# Patient Record
Sex: FEMALE | Race: WHITE | Employment: FULL TIME | URBAN - METROPOLITAN AREA
[De-identification: names, ages, dates, MRNs, and addresses within clinical notes are randomized per-mention and may not be internally consistent; named-entity substitution may affect disease eponyms.]

---

## 2018-01-09 ENCOUNTER — RADIANT APPOINTMENT (OUTPATIENT)
Dept: GENERAL RADIOLOGY | Facility: CLINIC | Age: 58
End: 2018-01-09
Attending: INTERNAL MEDICINE
Payer: COMMERCIAL

## 2018-01-09 ENCOUNTER — OFFICE VISIT (OUTPATIENT)
Dept: FAMILY MEDICINE | Facility: CLINIC | Age: 58
End: 2018-01-09
Payer: COMMERCIAL

## 2018-01-09 VITALS
SYSTOLIC BLOOD PRESSURE: 145 MMHG | HEART RATE: 64 BPM | WEIGHT: 143 LBS | DIASTOLIC BLOOD PRESSURE: 80 MMHG | OXYGEN SATURATION: 99 % | BODY MASS INDEX: 24.19 KG/M2 | RESPIRATION RATE: 15 BRPM | TEMPERATURE: 97.1 F

## 2018-01-09 DIAGNOSIS — R03.0 ELEVATED BLOOD PRESSURE READING WITHOUT DIAGNOSIS OF HYPERTENSION: ICD-10-CM

## 2018-01-09 DIAGNOSIS — M25.522 LEFT ELBOW PAIN: Primary | ICD-10-CM

## 2018-01-09 DIAGNOSIS — M25.522 LEFT ELBOW PAIN: ICD-10-CM

## 2018-01-09 PROCEDURE — 99214 OFFICE O/P EST MOD 30 MIN: CPT | Performed by: INTERNAL MEDICINE

## 2018-01-09 PROCEDURE — 73070 X-RAY EXAM OF ELBOW: CPT | Mod: LT

## 2018-01-09 NOTE — NURSING NOTE
"Chief Complaint   Patient presents with     Elbow Pain     c/o left elbow pain after a fall 2 weeks ago       Initial /84  Pulse 64  Temp 97.1  F (36.2  C) (Oral)  Resp 15  Wt 143 lb (64.9 kg)  SpO2 99%  Breastfeeding? No  BMI 24.19 kg/m2 Estimated body mass index is 24.19 kg/(m^2) as calculated from the following:    Height as of 8/2/16: 5' 4.47\" (1.638 m).    Weight as of this encounter: 143 lb (64.9 kg).  Medication Reconciliation: complete   Marie Buchanan MA      "

## 2018-01-09 NOTE — MR AVS SNAPSHOT
After Visit Summary   1/9/2018    Kaleigh Tyson    MRN: [id number]           Patient Information     Date Of Birth          1960        Visit Information        Provider Department      1/9/2018 1:40 PM Adleita Bianchi MD [facility]        Today's Diagnoses     Left elbow pain    -  1    Elevated blood pressure reading without diagnosis of hypertension           Follow-ups after your visit        Additional Services     [facility] PT, HAND, AND CHIROPRACTIC REFERRAL       **This order will print in the [facility] Scheduling Office**    Physical Therapy, Hand Therapy and Chiropractic Care are available through:    *[facility]  *[facility]  *[facility]    Call one number to schedule at any of the above locations: [phone].    Your provider has referred you to: Physical Therapy at [facility] or [facility]    Indication/Reason for Referral: Elbow Pain  Onset of Illness: weeks  Therapy Orders: Evaluate and Treat  Special Programs: None  Special Request: None    Jolanta Stover      Additional Comments for the Therapist or Chiropractor:     Please be aware that coverage of these services is subject to the terms and limitations of your health insurance plan.  Call member services at your health plan with any benefit or coverage questions.      Please bring the following to your appointment:    *Your personal calendar for scheduling future appointments  *Comfortable clothing                  Follow-up notes from your care team     Return in about 2 weeks (around 1/23/2018) for with physical therapy or primary care doctor.      Who to contact     If you have questions or need follow up information about today's clinic visit or your schedule please contact [facility] directly at [phone].  Normal or non-critical lab and imaging results will be communicated to you by MyChart, letter or phone within 4 business days after the clinic has  "received the results. If you do not hear from us within 7 days, please contact the clinic through [employer] or phone. If you have a critical or abnormal lab result, we will notify you by phone as soon as possible.  Submit refill requests through [employer] or call your pharmacy and they will forward the refill request to us. Please allow 3 business days for your refill to be completed.          Additional Information About Your Visit        [email or web address] Information     [employer] lets you send messages to your doctor, view your test results, renew your prescriptions, schedule appointments and more. To sign up, go to [email or web address] . Click on \"Log in\" on the left side of the screen, which will take you to the Welcome page. Then click on \"Sign up Now\" on the right side of the page.     You will be asked to enter the access code listed below, as well as some personal information. Please follow the directions to create your username and password.     Your access code is: [email or web address]  Expires: 2018  2:14 PM     Your access code will  in 90 days. If you need help or a new code, please call your [facility] clinic or [phone].        Care EveryWhere ID     This is your Care EveryWhere ID. This could be used by other organizations to access your [facility] medical records  [id number]        Your Vitals Were     Pulse Temperature Respirations Pulse Oximetry Breastfeeding? BMI (Body Mass Index)    64 97.1  F (36.2  C) (Oral) 15 99% No 24.19 kg/m2       Blood Pressure from Last 3 Encounters:   18 145/80   16 139/69   09 138/88    Weight from Last 3 Encounters:   18 143 lb (64.9 kg)   16 143 lb (64.9 kg)   09 169 lb (76.7 kg)              We Performed the Following     [facility]TY PT, HAND, AND CHIROPRACTIC REFERRAL        Primary Care Provider Office Phone # Fax #    [facility] [phone] [address] [facility] [address]        Equal Access to Services "     KARRIE BRADLEY : Hadii aad ku ruba Kaplan, wataylorda luqadaha, qaybta kaalmada josyhannahherman, waxlisbeth willie astudilloyoselinjose tomlinson . So [facility] [place] [phone].    ATENCIÓN: Si habla español, tiene a pelaez disposición servicios gratuitos de asistencia lingüística. Llame al [phone].    We comply with applicable federal civil rights laws and [place] laws. We do not discriminate on the basis of race, color, national origin, age, disability, sex, sexual orientation, or gender identity.            Thank you!     [facility] you for [facility]  [facility]. Our goal is always to provide you with excellent care. Hearing back from our patients is one way we can continue to improve our services. Please take a few minutes to complete the written survey that you may receive in the mail after your visit with us. Thank you!             Your Updated Medication List - Protect others around you: Learn how to safely use, store and throw away your medicines at www.disposemymeds.org.          This list is accurate as of: 1/9/18  2:38 PM.  Always use your most recent med list.                   Brand Name Dispense Instructions for use Diagnosis    EPINEPHrine 0.3 MG/0.3ML injection 2-pack    EPIPEN/ADRENACLICK/or ANY BX GENERIC EQUIV     Inject 0.3 mg into the muscle

## 2018-01-09 NOTE — PROGRESS NOTES
"SUBJECTIVE:  Kaleigh Tyson is an 57 year old female who presents for elbow pain.  A couple weeks ago tripped and fell in the garage.  When she fell her left elbow hit a fireplace that was there waiting to be installed.  Had some pain at the time.    No bruising or swelling.  When moves elbow feels \"something\" near elbow.  No fever, chills, sweats.  No meds taken for sxs.  Didn't ice it.  A few months ago had a glass door that was being installed fall and hit her elbow, which seemed to get better and be okay.  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  No other injuries from fall and did not hit head.  No dizziness or weakness or cp or shortness of breath before or after the fall.       has a past medical history of Elevated cholesterol.  Social History     Social History     Marital status:      Spouse name: N/A     Number of children: N/A     Years of education: N/A     Social History Main Topics     Smoking status: Former Smoker     Types: Cigarettes     Quit date: 1/1/2008     Smokeless tobacco: None     Alcohol use Yes      Comment: rare     Drug use: No     Sexual activity: Yes     Partners: Male     Other Topics Concern     None     Social History Narrative     Family History   Problem Relation Age of Onset     HEART DISEASE Father      Dementia Father      Neurologic Disorder Brother      Lipids Sister      Lipids Sister      Myocardial Infarction Mother      Hypertension Mother        ALLERGIES:  Bees    Current Outpatient Prescriptions   Medication     EPINEPHrine (EPIPEN) 0.3 MG/0.3ML injection     No current facility-administered medications for this visit.          ROS:  ROS is done and is negative for general, constitutional, eye, ENT, Respiratory, cardiovascular, GI, , Skin, musculoskeletal except as noted elsewhere.      OBJECTIVE:  /80  Pulse 64  Temp 97.1  F (36.2  C) (Oral)  Resp 15  Wt 143 lb (64.9 kg)  SpO2 99%  Breastfeeding? " No  BMI 24.19 kg/m2  GENERAL APPEARANCE: Alert, in no acute distress  EYES: normal  NECK:No adenopathy,masses or thyromegaly  RESP: normal and clear to auscultation  CV:regular rate and rhythm and no murmurs, clicks, or gallops  ABDOMEN: Abdomen soft, non-tender. BS normal. No masses, organomegaly  SKIN: no ulcers, lesions or rash  MUSCULOSKELETAL:left elbow - no erythema, edema or bruising.  Normal rom in all directions without pain.  Very mild tenderness just superior to medial epicondyle.  NEURO:  DTRs 2+,  sensation to touch grossly intact and strength 5/5 and symmetric in bilateral UEs.      RESULTS  Xray left elbow: no fractures or dislocation noted.  Await radiology reading  .      ASSESSMENT/PLAN:    ASSESSMENT / PLAN:  (M25.522) Left elbow pain  (primary encounter diagnosis)  Comment: pain seems to be fairly mild at this point and does not keep her from doing things.  C/w soft tissue injury from fall and impact.  Plan: XR Elbow Left 2 Views, BETTY PT, HAND, AND         CHIROPRACTIC REFERRAL        I reviewed supportive care including ice, ibuprofen and rest; expected course, and signs of concern.  Follow up as needed or if she does not improve within 2 week(s) or if worsens in any way. If not improved in 2 weeks, she is to go to PT and if still not improve with PT, f/u with pcp or ortho. Reviewed red flag symptoms and is to go to the ER if experiences any of these.      (R03.0) Elevated blood pressure reading without diagnosis of hypertension  Comment: second bp improved from first reading, but still a bit elevated  Plan: advised to recheck BP at pharmacy in 1-2 weeks and if remains elevated, f/u with pcp.      See [facility] for orders, medications, letters, patient instructions    Adelita Bianchi M.D.

## 2018-03-05 NOTE — PROGRESS NOTES
SUBJECTIVE:   CC: Kaleigh Tyson is an 57 year old woman who presents for preventive health visit.     Answers for HPI/ROS submitted by the patient on 3/19/2018   Annual Exam:  Getting at least 3 servings of Calcium per day:: Yes  Bi-annual eye exam:: NO  Dental care twice a year:: Yes  Sleep apnea or symptoms of sleep apnea:: None  Diet:: Breakfast skipped, Other  Frequency of exercise:: 2-3 days/week  Taking medications regularly:: Yes  Medication side effects:: None  Additional concerns today:: No  PHQ-2 Score: 0  Duration of exercise:: 15-30 minutes            Today's PHQ-2 Score:   PHQ-2 ( 1999 [employer]) 3/19/2018 1/9/2018   Q1: Little interest or pleasure in doing things 0 0   Q2: Feeling down, depressed or hopeless 0 0   PHQ-2 Score 0 0   Q1: Little interest or pleasure in doing things Not at all -   Q2: Feeling down, depressed or hopeless Not at all -   PHQ-2 Score 0 -       Abuse: Current or Past(Physical, Sexual or Emotional)- No  Do you feel safe in your environment - Yes    Social History   Substance Use Topics     Smoking status: Former Smoker     Types: Cigarettes     Quit date: 1/1/2008     Smokeless tobacco: Current User     Alcohol use Yes      Comment: rare     If you drink alcohol do you typically have >3 drinks per day or >7 drinks per week? No                     Reviewed orders with patient.  Reviewed health maintenance and updated orders accordingly - Yes    G 1 P 1   No LMP recorded. Patient is postmenopausal.     Fasting: Yes    Td: tdap 2016       Last 3 Pap and HPV Results:   PAP / HPV 8/2/2016   PAP NIL                  Cholesterol:   Lab Results   Component Value Date    CHOL 239 08/02/2016     Lab Results   Component Value Date    HDL 53 08/02/2016     Lab Results   Component Value Date     08/02/2016     Lab Results   Component Value Date    TRIG 176 08/02/2016     Lab Results   Component Value Date    CHOLHDLRATIO 6.1 09/14/2009     The 10-year ASCVD risk score (Flor WALTER Jr,  et al., 2013) is: 2.9%    Values used to calculate the score:      Age: 57 years      Sex: Female      Is Non- : No      Diabetic: No      Tobacco smoker: No      Systolic Blood Pressure: 138 mmHg      Is BP treated: No      HDL Cholesterol: 53 mg/dL      Total Cholesterol: 239 mg/dL     MM/16  Dexa:  NA     Flex/colo: due      Seat Belt: Yes    Sunscreen use: Yes   Calcium Intake: adeq  Health Care Directive: No  Sexually Active: Yes     Current contraception: none  History of abnormal Pap smear: No  Family history of colon/breast/ovarian cancer: No  Regular self breast exam: Yes  History of abnormal mammogram: No      Labs reviewed in EPIC  BP Readings from Last 3 Encounters:   18 138/76   18 145/80   16 139/69    Wt Readings from Last 3 Encounters:   18 141 lb 3.2 oz (64 kg)   18 143 lb (64.9 kg)   16 143 lb (64.9 kg)                  Patient Active Problem List   Diagnosis     CARDIOVASCULAR SCREENING; LDL GOAL LESS THAN 160     Past Surgical History:   Procedure Laterality Date     NO HISTORY OF SURGERY         Social History   Substance Use Topics     Smoking status: Former Smoker     Types: Cigarettes     Quit date: 2008     Smokeless tobacco: Current User     Alcohol use Yes      Comment: rare     Family History   Problem Relation Age of Onset     HEART DISEASE Father      Dementia Father      Neurologic Disorder Brother      Lipids Sister      Lipids Sister      Myocardial Infarction Mother      Hypertension Mother          Current Outpatient Prescriptions   Medication Sig Dispense Refill     EPINEPHrine (EPIPEN) 0.3 MG/0.3ML injection Inject 0.3 mg into the muscle         Patient over age 50, mutual decision to screen reflected in health maintenance.    Pertinent mammograms are reviewed under the imaging tab.      Reviewed and updated as needed this visit by clinical staff  Tobacco  Allergies  Meds  Problems  Med Hx  Surg Hx  Fam Hx  " Soc Hx          Reviewed and updated as needed this visit by Provider  Allergies  Meds  Problems            ROS:  C: NEGATIVE for fever, chills, change in weight  I: NEGATIVE for worrisome rashes, moles or lesions  E: NEGATIVE for vision changes or irritation  ENT: NEGATIVE for ear, mouth and throat problems  R: NEGATIVE for significant cough or SOB  B: NEGATIVE for masses, tenderness or discharge  CV: NEGATIVE for chest pain, palpitations or peripheral edema  GI: NEGATIVE for nausea, abdominal pain, heartburn, or change in bowel habits  : NEGATIVE for unusual urinary or vaginal symptoms. No vaginal bleeding.  M: NEGATIVE for significant arthralgias or myalgia  N: NEGATIVE for weakness, dizziness or paresthesias  P: NEGATIVE for changes in mood or affect     OBJECTIVE:   /76  Pulse 60  Temp 97.6  F (36.4  C) (Oral)  Ht 5' 4.25\" (1.632 m)  Wt 141 lb 3.2 oz (64 kg)  SpO2 99%  BMI 24.05 kg/m2  EXAM:  GENERAL APPEARANCE: healthy, alert and no distress  EYES: Eyes grossly normal to inspection, PERRL and conjunctivae and sclerae normal  HENT: ear canals and TM's normal, nose and mouth without ulcers or lesions, oropharynx clear and oral mucous membranes moist  NECK: no adenopathy, no asymmetry, masses, or scars and thyroid normal to palpation  RESP: lungs clear to auscultation - no rales, rhonchi or wheezes  BREAST: normal without masses, tenderness or nipple discharge and no palpable axillary masses or adenopathy  CV: regular rate and rhythm, normal S1 S2, no S3 or S4, no murmur, click or rub, no peripheral edema and peripheral pulses strong  ABDOMEN: soft, nontender, no hepatosplenomegaly, no masses and bowel sounds normal  MS: no musculoskeletal defects are noted and gait is age appropriate without ataxia  SKIN: no suspicious lesions or rashes  NEURO: Normal strength and tone, sensory exam grossly normal, mentation intact and speech normal  PSYCH: mentation appears normal and affect " "normal/bright    ASSESSMENT/PLAN:   (Z00.00) Encounter for routine adult health examination without abnormal findings  (primary encounter diagnosis)  Comment: preventive needs reviewed  Plan: see orders in Epic.     (T63.441D) Anaphylactic reaction to bee sting, accidental or unintentional, subsequent encounter  Comment: needs renewal  Plan: EPINEPHrine (EPIPEN/ADRENACLICK/OR ANY BX         GENERIC EQUIV) 0.3 MG/0.3ML injection 2-pack        refilled    (Z12.11) Screen for colon cancer  Comment: due  Plan: Fecal colorectal cancer screen (FIT)        \      COUNSELING:   Reviewed preventive health counseling, as reflected in patient instructions  Special attention given to:        Regular exercise       Healthy diet/nutrition    BP Screening:   Last 3 BP Readings:    BP Readings from Last 3 Encounters:   03/19/18 138/76   01/09/18 145/80   08/02/16 139/69       The following was recommended to the patient:  Re-screen BP within a year and recommended lifestyle modifications     reports that she quit smoking about 10 years ago. Her smoking use included Cigarettes. She uses smokeless tobacco.    Estimated body mass index is 24.05 kg/(m^2) as calculated from the following:    Height as of this encounter: 5' 4.25\" (1.632 m).    Weight as of this encounter: 141 lb 3.2 oz (64 kg).       Counseling Resources:  ATP IV Guidelines  Pooled Cohorts Equation Calculator  Breast Cancer Risk Calculator  FRAX Risk Assessment  ICSI Preventive Guidelines  Dietary Guidelines for Americans, 2010  USDA's MyPlate  ASA Prophylaxis  Lung CA Screening    Lauren Saez MD  [facility]  "

## 2018-03-19 ENCOUNTER — OFFICE VISIT (OUTPATIENT)
Dept: FAMILY MEDICINE | Facility: CLINIC | Age: 58
End: 2018-03-19
Payer: COMMERCIAL

## 2018-03-19 VITALS
WEIGHT: 141.2 LBS | SYSTOLIC BLOOD PRESSURE: 138 MMHG | OXYGEN SATURATION: 99 % | BODY MASS INDEX: 24.11 KG/M2 | TEMPERATURE: 97.6 F | HEIGHT: 64 IN | HEART RATE: 60 BPM | DIASTOLIC BLOOD PRESSURE: 76 MMHG

## 2018-03-19 DIAGNOSIS — T63.441D ANAPHYLACTIC REACTION TO BEE STING, ACCIDENTAL OR UNINTENTIONAL, SUBSEQUENT ENCOUNTER: ICD-10-CM

## 2018-03-19 DIAGNOSIS — Z12.11 SCREEN FOR COLON CANCER: ICD-10-CM

## 2018-03-19 DIAGNOSIS — T78.2XXD ANAPHYLACTIC REACTION TO BEE STING, ACCIDENTAL OR UNINTENTIONAL, SUBSEQUENT ENCOUNTER: ICD-10-CM

## 2018-03-19 DIAGNOSIS — Z00.00 ENCOUNTER FOR ROUTINE ADULT HEALTH EXAMINATION WITHOUT ABNORMAL FINDINGS: Primary | ICD-10-CM

## 2018-03-19 PROCEDURE — 99396 PREV VISIT EST AGE 40-64: CPT | Performed by: FAMILY MEDICINE

## 2018-03-19 RX ORDER — EPINEPHRINE 0.3 MG/.3ML
0.3 INJECTION SUBCUTANEOUS PRN
Qty: 0.6 ML | Refills: 11 | Status: SHIPPED | OUTPATIENT
Start: 2018-03-19 | End: 2019-05-09

## 2018-03-19 NOTE — MR AVS SNAPSHOT
After Visit Summary   3/19/2018    Kaleigh Tyson    MRN: [id number]           Patient Information     Date Of Birth          1960        Visit Information        Provider Department      3/19/2018 9:20 AM Lauren Saez MD [facility]        Today's Diagnoses     Screen for colon cancer    -  1    Encounter for routine adult health examination without abnormal findings        Anaphylactic reaction to bee sting, accidental or unintentional, subsequent encounter          Care Instructions      Preventive Health Recommendations  Female Ages 50 - 64    Yearly exam: See your health care provider every year in order to  o Review health changes.   o Discuss preventive care.    o Review your medicines if your doctor has prescribed any.      Get a Pap test every three years (unless you have an abnormal result and your provider advises testing more often).    If you get Pap tests with HPV test, you only need to test every 5 years, unless you have an abnormal result.     You do not need a Pap test if your uterus was removed (hysterectomy) and you have not had cancer.    You should be tested each year for STDs (sexually transmitted diseases) if you're at risk.     Have a mammogram every 1 to 2 years.    Have a colonoscopy at age 50, or have a yearly FIT test (stool test). These exams screen for colon cancer.      Have a cholesterol test every 5 years, or more often if advised.    Have a diabetes test (fasting glucose) every three years. If you are at risk for diabetes, you should have this test more often.     If you are at risk for osteoporosis (brittle bone disease), think about having a bone density scan (DEXA).    Shots: Get a flu shot each year. Get a tetanus shot every 10 years.    Nutrition:     Eat at least 5 servings of fruits and vegetables each day.    Eat whole-grain bread, whole-wheat pasta and brown rice instead of white grains and rice.    Talk to your provider about Calcium  "and Vitamin D.     Lifestyle    Exercise at least 150 minutes a week (30 minutes a day, 5 days a week). This will help you control your weight and prevent disease.    Limit alcohol to one drink per day.    No smoking.     Wear sunscreen to prevent skin cancer.     See your dentist every six months for an exam and cleaning.    See your eye doctor every 1 to 2 years.            Follow-ups after your visit        Future tests that were ordered for you today     Open Future Orders        Priority Expected Expires Ordered    Fecal colorectal cancer screen (FIT) Routine 4/9/2018 6/11/2018 3/19/2018            Who to contact     If you have questions or need follow up information about today's clinic visit or your schedule please contact [facility] directly at [phone].  Normal or non-critical lab and imaging results will be communicated to you by [employer], letter or phone within 4 business days after the clinic has received the results. If you do not hear from us within 7 days, please contact the clinic through [employer] or phone. If you have a critical or abnormal lab result, we will notify you by phone as soon as possible.  Submit refill requests through [employer] or call your pharmacy and they will forward the refill request to us. Please allow 3 business days for your refill to be completed.          Additional Information About Your Visit        [employer] Information     [employer] lets you send messages to your doctor, view your test results, renew your prescriptions, schedule appointments and more. To sign up, go to [email or web address] . Click on \"Log in\" on the left side of the screen, which will take you to the Welcome page. Then click on \"Sign up Now\" on the right side of the page.     You will be asked to enter the access code listed below, as well as some personal information. Please follow the directions to create your username and password.     Your access code is: [email or web address]  Expires: 4/9/2018 " " 3:14 PM     Your access code will  in 90 days. If you need help or a new code, please call your [facility] or [phone].        Care EveryWhere ID     This is your Care EveryWhere ID. This could be used by other organizations to access your [facility] medical records  [id number]        Your Vitals Were     Pulse Temperature Height Pulse Oximetry BMI (Body Mass Index)       60 97.6  F (36.4  C) (Oral) 5' 4.25\" (1.632 m) 99% 24.05 kg/m2        Blood Pressure from Last 3 Encounters:   18 138/76   18 145/80   16 139/69    Weight from Last 3 Encounters:   18 141 lb 3.2 oz (64 kg)   18 143 lb (64.9 kg)   16 143 lb (64.9 kg)                 Today's Medication Changes          These changes are accurate as of 3/19/18 10:03 AM.  If you have any questions, ask your nurse or doctor.               These medicines have changed or have updated prescriptions.        Dose/Directions    EPINEPHrine 0.3 MG/0.3ML injection 2-pack   Commonly known as:  EPIPEN/ADRENACLICK/or ANY BX GENERIC EQUIV   This may have changed:    - when to take this  - reasons to take this   Used for:  Anaphylactic reaction to bee sting, accidental or unintentional, subsequent encounter   Changed by:  Lauren Saez MD        Dose:  0.3 mg   Inject 0.3 mLs (0.3 mg) into the muscle as needed for anaphylaxis   Quantity:  0.6 mL   Refills:  11            Where to get your medicines      Some of these will need a paper prescription and others can be bought over the counter.  Ask your nurse if you have questions.     Bring a paper prescription for each of these medications     EPINEPHrine 0.3 MG/0.3ML injection 2-pack                Primary Care Provider Office Phone # Fax #    [facility] [phone] [address] [facility] [address]        Equal Access to Services     KARRIE BRADLEY AH: Marybeth yeh Somaricel, waaxda luqadaha, qaybta kaalmada jori, gloria trujilloin " freddiejaspallucien ferrisgia baudiliosarina laissalucien ah. So [facility] [phone].    ATENCIÓN: Si rosemaryla chivo, tiene a pelaez disposición servicios gratuitos de asistencia lingüística. [facility] [phone].    We comply with applicable federal civil rights laws and [place] laws. We do not discriminate on the basis of race, color, national origin, age, disability, sex, sexual orientation, or gender identity.            Thank you!     Thank you for choosing [facility]  [facility] your [facility]. Our goal is always to provide you with excellent care. Hearing back from our patients is one way we can continue to improve our services. Please take a few minutes to complete the written survey that you may receive in the mail after your visit with us. Thank you!             Your Updated Medication List - Protect others around you: Learn how to safely use, store and throw away your medicines at www.disposemymeds.org.          This list is accurate as of 3/19/18 10:03 AM.  Always use your most recent med list.                   Brand Name Dispense Instructions for use Diagnosis    EPINEPHrine 0.3 MG/0.3ML injection 2-pack    EPIPEN/ADRENACLICK/or ANY BX GENERIC EQUIV    0.6 mL    Inject 0.3 mLs (0.3 mg) into the muscle as needed for anaphylaxis    Anaphylactic reaction to bee sting, accidental or unintentional, subsequent encounter

## 2018-04-14 ENCOUNTER — RADIANT APPOINTMENT (OUTPATIENT)
Dept: MAMMOGRAPHY | Facility: CLINIC | Age: 58
End: 2018-04-14
Payer: COMMERCIAL

## 2018-04-14 DIAGNOSIS — Z12.31 VISIT FOR SCREENING MAMMOGRAM: ICD-10-CM

## 2018-04-14 PROCEDURE — 77067 SCR MAMMO BI INCL CAD: CPT | Mod: TC

## 2018-04-16 PROCEDURE — 82274 ASSAY TEST FOR BLOOD FECAL: CPT | Performed by: FAMILY MEDICINE

## 2018-04-19 DIAGNOSIS — Z12.11 SCREEN FOR COLON CANCER: ICD-10-CM

## 2018-04-19 LAB — HEMOCCULT STL QL IA: NEGATIVE

## 2018-07-02 ENCOUNTER — OFFICE VISIT (OUTPATIENT)
Dept: URGENT CARE | Facility: URGENT CARE | Age: 58
End: 2018-07-02
Payer: COMMERCIAL

## 2018-07-02 VITALS
HEART RATE: 74 BPM | TEMPERATURE: 96.9 F | WEIGHT: 145 LBS | OXYGEN SATURATION: 97 % | BODY MASS INDEX: 24.7 KG/M2 | DIASTOLIC BLOOD PRESSURE: 90 MMHG | SYSTOLIC BLOOD PRESSURE: 160 MMHG

## 2018-07-02 DIAGNOSIS — T63.481A LOCAL REACTION TO INSECT STING, ACCIDENTAL OR UNINTENTIONAL, INITIAL ENCOUNTER: Primary | ICD-10-CM

## 2018-07-02 PROCEDURE — 99214 OFFICE O/P EST MOD 30 MIN: CPT | Performed by: FAMILY MEDICINE

## 2018-07-02 RX ORDER — PREDNISONE 20 MG/1
20 TABLET ORAL 2 TIMES DAILY
Qty: 10 TABLET | Refills: 0 | Status: SHIPPED | OUTPATIENT
Start: 2018-07-02 | End: 2018-07-09

## 2018-07-02 ASSESSMENT — ENCOUNTER SYMPTOMS
RHINORRHEA: 0
VOICE CHANGE: 0
CHEST TIGHTNESS: 0
CHOKING: 0
FACIAL SWELLING: 0
SHORTNESS OF BREATH: 0
FEVER: 0
TROUBLE SWALLOWING: 0

## 2018-07-02 NOTE — MR AVS SNAPSHOT
After Visit Summary   7/2/2018    Kaleigh Tyson    MRN: [id number]           Patient Information     Date Of Birth          1960        Visit Information        Provider Department      7/2/2018 7:15 PM Bertram Ugalde MD [facility]        Today's Diagnoses     Local reaction to insect sting, accidental or unintentional, initial encounter    -  1      Care Instructions      Local Reaction to an Insect Sting     Use epi pen if worse.    Fill prednisone prescription if not getting better with Benadryl    Use oral Benadryl as directed on package    You have been stung or bitten by an insect. The insect s venom or body fluid is causing your skin to react in the area where you were stung or bitten. This often causes redness, itching and swelling. This reaction will fade over a few hours, but it can last a few days. An insect bite or sting can become infected 1 to 3 days later, so watch for the signs below. Sometimes it is hard to tell the difference between a local reaction to the insect bite or sting and an early infection, so you may be given antibiotics.  Common insect stings causing problems are from wasps, bees, yellow jackets, and hornets. Common bites are from spiders, mosquitoes, fleas, or ticks. Other types of insects may be more common in different parts of the country or world.  Most people think of allergic reactions when someone has a rash or itchy skin. Symptoms can include:    Rash, hives, redness, welts, or blisters    Itching, burning, stinging, or pain    Swelling around the sting area.  Sometimes swelling spreads to other areas.  Home care  Medicines  The healthcare provider may prescribe medicines to relieve swelling, itching, and pain. Follow the provider s instructions when taking these medicines.    If you had a severe reaction, the provider may prescribe an epinephrine kit. Epinephrine will stop an allergic reaction from getting worse. Before you leave the  hospital, be sure that you understand when and how to use this medicine.    Diphenhydramine is an oral antihistamine available at drugstores and groceries. Unless a prescription antihistamine was given, you can use this medicine to reduce itching if large areas of the skin are involved. The medicine may make you sleepy, so be careful using it in the daytime or when going to school, working, or driving. Don t use diphenhydramine if you have glaucoma or if you are a man with trouble urinating because of an enlarged prostate. Other antihistamines cause less drowsiness and are good choices for daytime use. Ask your pharmacist for suggestions.    Don t use diphenhydramine cream on your skin. In some people it can cause additional reaction and make you allergic to this medicine.    Calamine lotion or oatmeal baths sometimes help with itching.    You may use acetaminophen or ibuprofen to control pain, unless another pain medicine was prescribed. Talk with your healthcare provider before using these medicines if you have chronic liver or kidney disease. Also talk with your provider if you ve had a stomach ulcer or GI bleeding.  General care      If itching is a problem, don t take hot showers or baths. Stay out of direct sunlight. These heat up your skin and will make the itching worse.    Use an ice pack to reduce local areas of redness and itching. You can make your own ice pack by putting ice cubes in a bag that seals and wrapping it in a thin towel. Don t put the ice directly on your skin, because it can damage the skin.    Try not to scratch any affected areas and damage the skin. This will help prevent an infection.    If oral antibiotics were prescribed, be sure to take them until finished.  Preventing future reactions    Future reactions could be worse than this one, so try to stay away from places where you might be stung again.    Be aware that honeybees nest in trees. Wasps and yellow jackets nest in the ground,  trees, or roof eaves.    If you are stung by a honeybee, a stinger will remain in your skin. Wasps, yellow jackets, and hornets don t leave a stinger behind. Move away from the nest area right away. The stinger of a honeybee releases a substance that will attract other bees to you. Once you are away from the nest, then remove the stinger as quickly as possible.    After any sting, you may apply ice and take diphenhydramine or another antihistamine. If you develop any of the warning signs below, seek help right away.    If you are at high risk for another sting, or if your reaction included dizziness, fainting, or trouble breathing or swallowing, ask your doctor for an insect allergy kit.    Remove any ticks on the skin with a set of fine tweezers.  the tick as close to the skin as possible. Pull back gently but firmly. Use an even, steady pressure. Don t jerk or twist. Don t squeeze, crush, or puncture the body of the tick. The bodily fluids may contain infection-causing germs. Don t use a smoldering match or cigarette, nail polish, petroleum jelly, liquid soap, or kerosene. These may irritate the tick. If any mouthparts of the tick remain in the skin, these should be left alone. They will fall off on their own. Trying to remove these parts may damage the skin unless they can be removed very easily. After the tick is removed, wash the bite area with rubbing alcohol, iodine, or soap and water.  Follow-up care  Follow up with your doctor in 2 days, or as advised, if your symptoms don t start to get better.  Call 911  Call 911 if any of these occur:    Trouble breathing or swallowing, or wheezing    New or worsening swelling in the mouth, throat, or tongue    Hoarse voice or trouble speaking    Confused    Very drowsy or trouble awakening    Fainting or loss of consciousness    Rapid heart rate    Low blood pressure    Feeling of doom    Nausea, vomiting, abdominal pain, or diarrhea    Vomiting blood, or large  amounts of blood in stool    Seizure  When to seek medical advice  Call your healthcare provider right away if any of these occur:    Spreading areas of itching, redness or swelling    New or worse swelling in the face, eyelids, or  lips    Dizziness or weakness  Also call your provider right away if you have signs of infection:    Spreading redness    Increased pain or swelling    Fever of 100.4 F (38 C) or higher, or as directed by your healthcare provider    Colored fluid draining from the sting area   Date Last Reviewed: 10/1/2016    6478-9923 [employer]. [address] [place] [address]. All rights reserved. This information is not intended as a substitute for professional medical care. Always follow your healthcare professional's instructions.                Follow-ups after your visit        Who to contact     If you have questions or need follow up information about today's clinic visit or your schedule please contact [facility] directly at [phone].  Normal or non-critical lab and imaging results will be communicated to you by MyChart, letter or phone within 4 business days after the clinic has received the results. If you do not hear from us within 7 days, please contact the clinic through MyChart or phone. If you have a critical or abnormal lab result, we will notify you by phone as soon as possible.  Submit refill requests through [employer] or call your pharmacy and they will forward the refill request to us. Please allow 3 business days for your refill to be completed.          Additional Information About Your Visit        Care EveryWhere ID     This is your Care EveryWhere ID. This could be used by other organizations to access your [facility] medical records  [id number]        Your Vitals Were     Pulse Temperature Pulse Oximetry BMI (Body Mass Index)          74 96.9  F (36.1  C) (Tympanic) 97% 24.7 kg/m2         Blood Pressure from Last 3 Encounters:    07/02/18 160/90   03/19/18 138/76   01/09/18 145/80    Weight from Last 3 Encounters:   07/02/18 145 lb (65.8 kg)   03/19/18 141 lb 3.2 oz (64 kg)   01/09/18 143 lb (64.9 kg)              Today, you had the following     No orders found for display         Today's Medication Changes          These changes are accurate as of 7/2/18  7:40 PM.  If you have any questions, ask your nurse or doctor.               Start taking these medicines.        Dose/Directions    predniSONE 20 MG tablet   Commonly known as:  DELTASONE   Used for:  Local reaction to insect sting, accidental or unintentional, initial encounter        Dose:  20 mg   Take 1 tablet (20 mg) by mouth 2 times daily   Quantity:  10 tablet   Refills:  0            Where to get your medicines      Some of these will need a paper prescription and others can be bought over the counter.  Ask your nurse if you have questions.     Bring a paper prescription for each of these medications     predniSONE 20 MG tablet                Primary Care Provider Office Phone # Fax #    [facility] [phone] [address] [facility] [address]        Equal Access to Services     KARRIE BRADLEY : Hadii ruth yeh Somaricel, waaxda luqmaria teresa, qaybta kaalmada jori, gloria tomlinson . So [facility] [phone].    ATENCIÓN: Si habla español, tiene a pelaez disposición servicios gratuitos de asistencia lingüística. [facility] [phone].    We comply with applicable federal civil rights laws and [place] laws. We do not discriminate on the basis of race, color, national origin, age, disability, sex, sexual orientation, or gender identity.            Thank you!     Thank you for choosing [facility]  for your care. Our goal is always to provide you with excellent care. Hearing back from our patients is one way we can continue to improve our services. Please take a few minutes to complete the written survey that  you may receive in the mail after your visit with us. Thank you!             Your Updated Medication List - Protect others around you: Learn how to safely use, store and throw away your medicines at www.disposemymeds.org.          This list is accurate as of 7/2/18  7:40 PM.  Always use your most recent med list.                   Brand Name Dispense Instructions for use Diagnosis    EPINEPHrine 0.3 MG/0.3ML injection 2-pack    EPIPEN/ADRENACLICK/or ANY BX GENERIC EQUIV    0.6 mL    Inject 0.3 mLs (0.3 mg) into the muscle as needed for anaphylaxis    Anaphylactic reaction to bee sting, accidental or unintentional, subsequent encounter       predniSONE 20 MG tablet    DELTASONE    10 tablet    Take 1 tablet (20 mg) by mouth 2 times daily    Local reaction to insect sting, accidental or unintentional, initial encounter

## 2018-07-03 NOTE — PATIENT INSTRUCTIONS
Local Reaction to an Insect Sting     Use epi pen if worse.    Fill prednisone prescription if not getting better with Benadryl    Use oral Benadryl as directed on package    You have been stung or bitten by an insect. The insect s venom or body fluid is causing your skin to react in the area where you were stung or bitten. This often causes redness, itching and swelling. This reaction will fade over a few hours, but it can last a few days. An insect bite or sting can become infected 1 to 3 days later, so watch for the signs below. Sometimes it is hard to tell the difference between a local reaction to the insect bite or sting and an early infection, so you may be given antibiotics.  Common insect stings causing problems are from wasps, bees, yellow jackets, and hornets. Common bites are from spiders, mosquitoes, fleas, or ticks. Other types of insects may be more common in different parts of the country or world.  Most people think of allergic reactions when someone has a rash or itchy skin. Symptoms can include:    Rash, hives, redness, welts, or blisters    Itching, burning, stinging, or pain    Swelling around the sting area.  Sometimes swelling spreads to other areas.  Home care  Medicines  The healthcare provider may prescribe medicines to relieve swelling, itching, and pain. Follow the provider s instructions when taking these medicines.    If you had a severe reaction, the provider may prescribe an epinephrine kit. Epinephrine will stop an allergic reaction from getting worse. Before you leave the hospital, be sure that you understand when and how to use this medicine.    Diphenhydramine is an oral antihistamine available at drugstores and groceries. Unless a prescription antihistamine was given, you can use this medicine to reduce itching if large areas of the skin are involved. The medicine may make you sleepy, so be careful using it in the daytime or when going to school, working, or driving. Don t use  diphenhydramine if you have glaucoma or if you are a man with trouble urinating because of an enlarged prostate. Other antihistamines cause less drowsiness and are good choices for daytime use. Ask your pharmacist for suggestions.    Don t use diphenhydramine cream on your skin. In some people it can cause additional reaction and make you allergic to this medicine.    Calamine lotion or oatmeal baths sometimes help with itching.    You may use acetaminophen or ibuprofen to control pain, unless another pain medicine was prescribed. Talk with your healthcare provider before using these medicines if you have chronic liver or kidney disease. Also talk with your provider if you ve had a stomach ulcer or GI bleeding.  General care      If itching is a problem, don t take hot showers or baths. Stay out of direct sunlight. These heat up your skin and will make the itching worse.    Use an ice pack to reduce local areas of redness and itching. You can make your own ice pack by putting ice cubes in a bag that seals and wrapping it in a thin towel. Don t put the ice directly on your skin, because it can damage the skin.    Try not to scratch any affected areas and damage the skin. This will help prevent an infection.    If oral antibiotics were prescribed, be sure to take them until finished.  Preventing future reactions    Future reactions could be worse than this one, so try to stay away from places where you might be stung again.    Be aware that honeybees nest in trees. Wasps and yellow jackets nest in the ground, trees, or roof eaves.    If you are stung by a honeybee, a stinger will remain in your skin. Wasps, yellow jackets, and hornets don t leave a stinger behind. Move away from the nest area right away. The stinger of a honeybee releases a substance that will attract other bees to you. Once you are away from the nest, then remove the stinger as quickly as possible.    After any sting, you may apply ice and take  diphenhydramine or another antihistamine. If you develop any of the warning signs below, seek help right away.    If you are at high risk for another sting, or if your reaction included dizziness, fainting, or trouble breathing or swallowing, ask your doctor for an insect allergy kit.    Remove any ticks on the skin with a set of fine tweezers.  the tick as close to the skin as possible. Pull back gently but firmly. Use an even, steady pressure. Don t jerk or twist. Don t squeeze, crush, or puncture the body of the tick. The bodily fluids may contain infection-causing germs. Don t use a smoldering match or cigarette, nail polish, petroleum jelly, liquid soap, or kerosene. These may irritate the tick. If any mouthparts of the tick remain in the skin, these should be left alone. They will fall off on their own. Trying to remove these parts may damage the skin unless they can be removed very easily. After the tick is removed, wash the bite area with rubbing alcohol, iodine, or soap and water.  Follow-up care  Follow up with your doctor in 2 days, or as advised, if your symptoms don t start to get better.  Call 911  Call 911 if any of these occur:    Trouble breathing or swallowing, or wheezing    New or worsening swelling in the mouth, throat, or tongue    Hoarse voice or trouble speaking    Confused    Very drowsy or trouble awakening    Fainting or loss of consciousness    Rapid heart rate    Low blood pressure    Feeling of doom    Nausea, vomiting, abdominal pain, or diarrhea    Vomiting blood, or large amounts of blood in stool    Seizure  When to seek medical advice  Call your healthcare provider right away if any of these occur:    Spreading areas of itching, redness or swelling    New or worse swelling in the face, eyelids, or  lips    Dizziness or weakness  Also call your provider right away if you have signs of infection:    Spreading redness    Increased pain or swelling    Fever of 100.4 F (38 C) or  higher, or as directed by your healthcare provider    Colored fluid draining from the sting area   Date Last Reviewed: 10/1/2016    2330-5528 [employer]. [address] [place] [address], [place] [address]. All rights reserved. This information is not intended as a substitute for professional medical care. Always follow your healthcare professional's instructions.

## 2018-07-03 NOTE — PROGRESS NOTES
SUBJECTIVE:   Kaleigh Tyson is a 57 year old female presenting with a chief complaint of   Chief Complaint   Patient presents with     Allergic Reaction     Patient's right hand is swelling x today.  She is not sure what bite her.        She is an established patient of [facility].    Bite/Sting    Onset of symptoms was 1 hour(s) ago.  Course of illness is same.    Severity moderate  Location: R wrist/hand  Context: unknown insect while fishing  Current and Associated symptoms: itching, redness, warmth and swelling  Treatment measures tried include: ice and fexofenadine  Relief from treatment: minor  Significant past medical history: history of prior reactions      Review of Systems   Constitutional: Negative for fever.   HENT: Negative for facial swelling, rhinorrhea, trouble swallowing and voice change.    Respiratory: Negative for choking, chest tightness and shortness of breath.    Cardiovascular: Negative for chest pain.   Skin: Positive for rash.   Allergic/Immunologic: Positive for environmental allergies.       Past Medical History:   Diagnosis Date     Elevated cholesterol     Low HDL - 41 in 1/09     Family History   Problem Relation Age of Onset     HEART DISEASE Father      Dementia Father      Neurologic Disorder Brother      Lipids Sister      Lipids Sister      Myocardial Infarction Mother      Hypertension Mother      Current Outpatient Prescriptions   Medication Sig Dispense Refill     EPINEPHrine (EPIPEN/ADRENACLICK/OR ANY BX GENERIC EQUIV) 0.3 MG/0.3ML injection 2-pack Inject 0.3 mLs (0.3 mg) into the muscle as needed for anaphylaxis 0.6 mL 11     predniSONE (DELTASONE) 20 MG tablet Take 1 tablet (20 mg) by mouth 2 times daily 10 tablet 0     Social History   Substance Use Topics     Smoking status: Former Smoker     Types: Cigarettes     Quit date: 1/1/2008     Smokeless tobacco: Current User     Alcohol use Yes      Comment: rare       OBJECTIVE  /90  Pulse 74  Temp 96.9  F (36.1  C)  (Tympanic)  Wt 145 lb (65.8 kg)  SpO2 97%  BMI 24.7 kg/m2    Physical Exam   Constitutional: She appears well-developed and well-nourished. No distress.   HENT:   Head: Normocephalic.   Mouth/Throat: Oropharynx is clear and moist.   Neck: Normal range of motion.   Cardiovascular: Normal rate and regular rhythm.    Pulmonary/Chest: Effort normal and breath sounds normal.   Lymphadenopathy:     She has no cervical adenopathy.   Neurological: She is alert.   Skin: Skin is warm and dry. Rash noted. She is not diaphoretic. There is erythema.            Labs:  No results found for this or any previous visit (from the past 24 hour(s)).    X-Ray was not done.    ASSESSMENT:      ICD-10-CM    1. Local reaction to insect sting, accidental or unintentional, initial encounter T63.481A predniSONE (DELTASONE) 20 MG tablet        Medical Decision Making:    Differential Diagnosis:  Insect Bite: Insect sting, Mosquito bite, Anaphyllaxis, Exagerated local reaction to bite, Hives and Urticaria    Serious Comorbid Conditions:  Adult:  None    PLAN:    Bite/Sting:    Ice, Benadryl tabs and Prednisone    Followup:    If not improving or if condition worsens, follow up with your Primary Care Provider    Patient Instructions     Local Reaction to an Insect Sting     Use epi pen if worse.    Fill prednisone prescription if not getting better with Benadryl    Use oral Benadryl as directed on package    You have been stung or bitten by an insect. The insect s venom or body fluid is causing your skin to react in the area where you were stung or bitten. This often causes redness, itching and swelling. This reaction will fade over a few hours, but it can last a few days. An insect bite or sting can become infected 1 to 3 days later, so watch for the signs below. Sometimes it is hard to tell the difference between a local reaction to the insect bite or sting and an early infection, so you may be given antibiotics.  Common insect stings  causing problems are from wasps, bees, yellow jackets, and hornets. Common bites are from spiders, mosquitoes, fleas, or ticks. Other types of insects may be more common in different parts of the country or world.  Most people think of allergic reactions when someone has a rash or itchy skin. Symptoms can include:    Rash, hives, redness, welts, or blisters    Itching, burning, stinging, or pain    Swelling around the sting area.  Sometimes swelling spreads to other areas.  Home care  Medicines  The healthcare provider may prescribe medicines to relieve swelling, itching, and pain. Follow the provider s instructions when taking these medicines.    If you had a severe reaction, the provider may prescribe an epinephrine kit. Epinephrine will stop an allergic reaction from getting worse. Before you leave the hospital, be sure that you understand when and how to use this medicine.    Diphenhydramine is an oral antihistamine available at drugstores and groceries. Unless a prescription antihistamine was given, you can use this medicine to reduce itching if large areas of the skin are involved. The medicine may make you sleepy, so be careful using it in the daytime or when going to school, working, or driving. Don t use diphenhydramine if you have glaucoma or if you are a man with trouble urinating because of an enlarged prostate. Other antihistamines cause less drowsiness and are good choices for daytime use. Ask your pharmacist for suggestions.    Don t use diphenhydramine cream on your skin. In some people it can cause additional reaction and make you allergic to this medicine.    Calamine lotion or oatmeal baths sometimes help with itching.    You may use acetaminophen or ibuprofen to control pain, unless another pain medicine was prescribed. Talk with your healthcare provider before using these medicines if you have chronic liver or kidney disease. Also talk with your provider if you ve had a stomach ulcer or GI  bleeding.  General care      If itching is a problem, don t take hot showers or baths. Stay out of direct sunlight. These heat up your skin and will make the itching worse.    Use an ice pack to reduce local areas of redness and itching. You can make your own ice pack by putting ice cubes in a bag that seals and wrapping it in a thin towel. Don t put the ice directly on your skin, because it can damage the skin.    Try not to scratch any affected areas and damage the skin. This will help prevent an infection.    If oral antibiotics were prescribed, be sure to take them until finished.  Preventing future reactions    Future reactions could be worse than this one, so try to stay away from places where you might be stung again.    Be aware that honeybees nest in trees. Wasps and yellow jackets nest in the ground, trees, or roof eaves.    If you are stung by a honeybee, a stinger will remain in your skin. Wasps, yellow jackets, and hornets don t leave a stinger behind. Move away from the nest area right away. The stinger of a honeybee releases a substance that will attract other bees to you. Once you are away from the nest, then remove the stinger as quickly as possible.    After any sting, you may apply ice and take diphenhydramine or another antihistamine. If you develop any of the warning signs below, seek help right away.    If you are at high risk for another sting, or if your reaction included dizziness, fainting, or trouble breathing or swallowing, ask your doctor for an insect allergy kit.    Remove any ticks on the skin with a set of fine tweezers.  the tick as close to the skin as possible. Pull back gently but firmly. Use an even, steady pressure. Don t jerk or twist. Don t squeeze, crush, or puncture the body of the tick. The bodily fluids may contain infection-causing germs. Don t use a smoldering match or cigarette, nail polish, petroleum jelly, liquid soap, or kerosene. These may irritate the tick. If  any mouthparts of the tick remain in the skin, these should be left alone. They will fall off on their own. Trying to remove these parts may damage the skin unless they can be removed very easily. After the tick is removed, wash the bite area with rubbing alcohol, iodine, or soap and water.  Follow-up care  Follow up with your doctor in 2 days, or as advised, if your symptoms don t start to get better.  Call 911  Call 911 if any of these occur:    Trouble breathing or swallowing, or wheezing    New or worsening swelling in the mouth, throat, or tongue    Hoarse voice or trouble speaking    Confused    Very drowsy or trouble awakening    Fainting or loss of consciousness    Rapid heart rate    Low blood pressure    Feeling of doom    Nausea, vomiting, abdominal pain, or diarrhea    Vomiting blood, or large amounts of blood in stool    Seizure  When to seek medical advice  Call your healthcare provider right away if any of these occur:    Spreading areas of itching, redness or swelling    New or worse swelling in the face, eyelids, or  lips    Dizziness or weakness  Also call your provider right away if you have signs of infection:    Spreading redness    Increased pain or swelling    Fever of 100.4 F (38 C) or higher, or as directed by your healthcare provider    Colored fluid draining from the sting area   Date Last Reviewed: 10/1/2016    5922-5972 [employer]. [address] [place] [address], [place] [address]. All rights reserved. This information is not intended as a substitute for professional medical care. Always follow your healthcare professional's instructions.

## 2018-07-05 NOTE — PROGRESS NOTES
SUBJECTIVE:                                                    Kaleigh Tyson is a 57 year old female who presents to clinic today for the following health issues:    Joint Pain    Onset: x 3 days    Description:   Location: right knee  Character: Dull ache    Intensity: mild    Progression of Symptoms: same    Accompanying Signs & Symptoms:  Other symptoms: swelling    History:   Previous similar pain: YES      Precipitating factors:   Trauma or overuse: YES- had an injury on the R knee x 8 yrs ago.    Alleviating factors:  Improved by: rest/inactivity and ice    Therapies Tried and outcome: noted above per pt when she can.      Was dancing at wedding and had some pain and swelling the next day in knee. No recent xrays or imaging. Never had knee surgery or physical therapy for her knee.   She is feeling a lot better.  Still has some pain after working 10 hours on her feet. Range of motion is normal per patient but was not initially.   Colon cancer was done with FIT testing.        Problem list and histories reviewed & adjusted, as indicated.  Additional history: as documented    Patient Active Problem List   Diagnosis     CARDIOVASCULAR SCREENING; LDL GOAL LESS THAN 160     Past Surgical History:   Procedure Laterality Date     NO HISTORY OF SURGERY         Social History   Substance Use Topics     Smoking status: Former Smoker     Types: Cigarettes     Quit date: 1/1/2008     Smokeless tobacco: Current User     Alcohol use Yes      Comment: rare     Family History   Problem Relation Age of Onset     HEART DISEASE Father      Dementia Father      Neurologic Disorder Brother      Lipids Sister      Lipids Sister      Myocardial Infarction Mother      Hypertension Mother          Current Outpatient Prescriptions   Medication Sig Dispense Refill     EPINEPHrine (EPIPEN/ADRENACLICK/OR ANY BX GENERIC EQUIV) 0.3 MG/0.3ML injection 2-pack Inject 0.3 mLs (0.3 mg) into the muscle as needed for anaphylaxis 0.6 mL 11      Allergies   Allergen Reactions     Bees Hives and Swelling       ROS:  Constitutional, HEENT, cardiovascular, pulmonary, GI, , musculoskeletal, neuro, skin, endocrine and psych systems are negative, except as otherwise noted.    OBJECTIVE:     /80  Pulse 68  Temp 97.7  F (36.5  C) (Oral)  Resp 16  Wt 145 lb (65.8 kg)  SpO2 98%  Breastfeeding? No  BMI 24.7 kg/m2  Body mass index is 24.7 kg/(m^2).  GENERAL: healthy, alert and no distress  RESP: lungs clear to auscultation - no rales, rhonchi or wheezes  CV: regular rate and rhythm, normal S1 S2, no S3 or S4, no murmur, click or rub, no peripheral edema and peripheral pulses strong  NEURO: Normal strength and tone, mentation intact and speech normal  PSYCH: mentation appears normal, affect normal/bright  Ortho: right Knee-No gross deformity.  Small effusion present. Has been improving per patient.  No erythema.   No ecchymosis. No warmth.  Tender to palpation inferior to patella only.   Range of motion intact fully.  Sensation intact distally.  Distal pulses strong. Hip, knee, and ankle strength 5/5 and equal bilaterally.  Anterior drawer sign negative. Valgus(MCL)/varus (LCL) testing negative for pain.  McMurrays negative.             ASSESSMENT/PLAN:   ASSESSMENT / PLAN:  (M25.561) Acute pain of right knee  (primary encounter diagnosis)  Comment:   Plan: sprain versus meniscal injury versus arthritis causing the fluid, this was aggravating after dancing for hours at a wedding    Discussed MRI versus conservative treatment, she prefers to monitor for now see below    Patient Instructions   Wear brace  Rest, ice as needed  Use ibuprofen for pain/swelling as needed  Let me know if worsening or not improving over the next week and I will order MRI and have you see ortho      Adelita Saucedo PA-C  [facility]

## 2018-07-09 ENCOUNTER — OFFICE VISIT (OUTPATIENT)
Dept: FAMILY MEDICINE | Facility: CLINIC | Age: 58
End: 2018-07-09
Payer: COMMERCIAL

## 2018-07-09 VITALS
BODY MASS INDEX: 24.7 KG/M2 | WEIGHT: 145 LBS | RESPIRATION RATE: 16 BRPM | TEMPERATURE: 97.7 F | HEART RATE: 68 BPM | DIASTOLIC BLOOD PRESSURE: 80 MMHG | OXYGEN SATURATION: 98 % | SYSTOLIC BLOOD PRESSURE: 138 MMHG

## 2018-07-09 DIAGNOSIS — M25.561 ACUTE PAIN OF RIGHT KNEE: Primary | ICD-10-CM

## 2018-07-09 PROCEDURE — 99213 OFFICE O/P EST LOW 20 MIN: CPT | Performed by: PHYSICIAN ASSISTANT

## 2018-07-09 NOTE — NURSING NOTE
"Chief Complaint   Patient presents with     Knee Pain     R knee pain per pt x 3 days no known injury     Health Maintenance     orders pended        Initial /80  Pulse 68  Temp 97.7  F (36.5  C) (Oral)  Resp 16  Wt 145 lb (65.8 kg)  SpO2 98%  Breastfeeding? No  BMI 24.7 kg/m2 Estimated body mass index is 24.7 kg/(m^2) as calculated from the following:    Height as of 3/19/18: 5' 4.25\" (1.632 m).    Weight as of this encounter: 145 lb (65.8 kg).  Medication Reconciliation: complete      Esther Santana MA    "

## 2018-07-09 NOTE — MR AVS SNAPSHOT
After Visit Summary   7/9/2018    Kaleigh Tyson    MRN: [id number]           Patient Information     Date Of Birth          1960        Visit Information        Provider Department      7/9/2018 7:40 AM Adelita Saucedo PA-C [facility]        Care Instructions    Wear brace  Rest, ice as needed  Use ibuprofen for pain/swelling as needed  Let me know if worsening or not improving over the next week and I will order MRI and have you see ortho          Follow-ups after your visit        Who to contact     If you have questions or need follow up information about today's clinic visit or your schedule please contact [facility] directly at [phone].  Normal or non-critical lab and imaging results will be communicated to you by MyChart, letter or phone within 4 business days after the clinic has received the results. If you do not hear from us within 7 days, please contact the clinic through MyChart or phone. If you have a critical or abnormal lab result, we will notify you by phone as soon as possible.  Submit refill requests through [employer] or call your pharmacy and they will forward the refill request to us. Please allow 3 business days for your refill to be completed.          Additional Information About Your Visit        Care EveryWhere ID     This is your Care EveryWhere ID. This could be used by other organizations to access your [facility] medical records  [id number]        Your Vitals Were     Pulse Temperature Respirations Pulse Oximetry Breastfeeding? BMI (Body Mass Index)    68 97.7  F (36.5  C) (Oral) 16 98% No 24.7 kg/m2       Blood Pressure from Last 3 Encounters:   07/09/18 138/80   07/02/18 160/90   03/19/18 138/76    Weight from Last 3 Encounters:   07/09/18 145 lb (65.8 kg)   07/02/18 145 lb (65.8 kg)   03/19/18 141 lb 3.2 oz (64 kg)              Today, you had the following     No orders found for display       Primary Care Provider Office  Phone # Fax #    [facility] [phone] [address] [facility] [address]        Equal Access to Services     KARRIE BRADLEY : Hadii aad ku hadabdi Sosofiali, wataylorda luqadaha, qakenta kaalmada jori, gloria alarcon bushra jimenez. So [facility] [phone].    ATENCIÓN: Si habla español, tiene a pelaez disposición servicios gratuitos de asistencia lingüística. Llame al [phone].    We comply with applicable federal civil rights laws and [place] laws. We do not discriminate on the basis of race, color, national origin, age, disability, sex, sexual orientation, or gender identity.            Thank you!     Thank you for choosing [facility]  for your care. Our goal is always to provide you with excellent care. Hearing back from our patients is one way we can continue to improve our services. Please take a few minutes to complete the written survey that you may receive in the mail after your visit with us. Thank you!             Your Updated Medication List - Protect others around you: Learn how to safely use, store and throw away your medicines at www.disposemymeds.org.          This list is accurate as of 7/9/18  8:06 AM.  Always use your most recent med list.                   Brand Name Dispense Instructions for use Diagnosis    EPINEPHrine 0.3 MG/0.3ML injection 2-pack    EPIPEN/ADRENACLICK/or ANY BX GENERIC EQUIV    0.6 mL    Inject 0.3 mLs (0.3 mg) into the muscle as needed for anaphylaxis    Anaphylactic reaction to bee sting, accidental or unintentional, subsequent encounter

## 2018-07-09 NOTE — PATIENT INSTRUCTIONS
Wear brace  Rest, ice as needed  Use ibuprofen for pain/swelling as needed  Let me know if worsening or not improving over the next week and I will order MRI and have you see ortho

## 2018-07-16 NOTE — PROGRESS NOTES
"  SUBJECTIVE:   Kaleigh Tyson is a 57 year old female who presents to clinic today for the following health issues:      ED/UC Followup:    Facility:  [facility]   Date of visit: 7/15 and  7/18/18  Reason for visit: Allergic Reaction   Current Status: improved, however had mouth swelling today went back to the ER     7/15/18: ED visit notes: \"...57 y.o. female who had anaphylaxis from an unknown trigger. It may have been from insect bites but is unclear at this time. She was given epinephrine by paramedics with significant improvement by the time she came to the emergency department. She had diffuse urticaria, and shortness of breath. She was given Benadryl for histamine 1 blockade and Pepsid for histamine 2 blockade. She was also given Solu-medrol intravenously. She was sent home with a prescription for prednisone. She requested a lower dose than the standard. She's worried about being sensitive to the side effects so she was given a prescription for 20 mg daily for five days rather than 40 mg. She understands that she must be at rest for the next 24-36 hours. She understands that she must return if she develops difficulty breathing, rash, shortness of breath, or other new symptoms. She will continue to use Benadryl and Pepcid orally as needed for itching or hives. Epi pen if needed for severe allergic symptoms, than go to ER or call 911...\"  Seen in the ED again this morning, 7.18.18 as she thought her tongue was getting more swollen, no overt swelling on ED dr's exam, was advised to continue her previously rxed allergy treatment.    She is here for follow-up. No new sensations of tongue swelling. Patient has an Epipen with her and knows how to use it.         Reviewed and updated as needed this visit by clinical staff  Tobacco  Allergies  Meds  Problems  Med Hx  Surg Hx  Fam Hx  Soc Hx        Reviewed and updated as needed this visit by Provider  Tobacco  Meds  Problems           Patient Active Problem List "   Diagnosis     CARDIOVASCULAR SCREENING; LDL GOAL LESS THAN 160       Past Medical History:   Diagnosis Date     Elevated cholesterol     Low HDL - 41 in 1/09       Past Surgical History:   Procedure Laterality Date     NO HISTORY OF SURGERY         Family History   Problem Relation Age of Onset     HEART DISEASE Father      Dementia Father      Neurologic Disorder Brother      Lipids Sister      Lipids Sister      Myocardial Infarction Mother      Hypertension Mother        Social History   Substance Use Topics     Smoking status: Former Smoker     Types: Cigarettes     Quit date: 1/1/2008     Smokeless tobacco: Current User     Alcohol use Yes      Comment: rare       Current Outpatient Prescriptions   Medication     EPINEPHrine (EPIPEN/ADRENACLICK/OR ANY BX GENERIC EQUIV) 0.3 MG/0.3ML injection 2-pack     No current facility-administered medications for this visit.          ROS:  Constitutional, HEENT, cardiovascular, pulmonary, GI, , musculoskeletal, neuro, skin, endocrine and psych systems are negative, except as otherwise noted.     OBJECTIVE:                                                    /80  Pulse 63  Temp 97.7  F (36.5  C) (Oral)  Wt 142 lb (64.4 kg)  SpO2 100%  BMI 24.18 kg/m2     GENERAL APPEARANCE: healthy, alert and in no distress  EYES: Eyes grossly normal to inspection, and conjunctivae and sclerae normal  HENT: head normocephalic and atraumatic and mouth without ulcers or lesions, oropharynx clear and oral mucous membranes moist  NECK: no noticeable adenopathy, no asymmetry, masses, or scars   RESP: lungs clear to auscultation - no rales, rhonchi or wheezes  CV: regular rate and rhythm, normal S1 S2, no S3 or S4, no murmur, click or rub, no peripheral edema and peripheral pulses strong  ABDOMEN: soft, nontender, no hepatosplenomegaly, no masses and bowel sounds normal  MS: no musculoskeletal defects are noted and gait is age appropriate without ataxia  SKIN: no suspicious lesions  or rashes  NEURO: mentation intact and speech normal  PSYCH: mentation appears normal and affect normal/bright.    Results for orders placed or performed in visit on 04/19/18   Fecal colorectal cancer screen (FIT)   Result Value Ref Range    Occult Blood Scn FIT Negative NEG^Negative       No results found for this or any previous visit (from the past 744 hour(s)).      ASSESSMENT/PLAN:                                                        ICD-10-CM    1. Anaphylaxis, sequela T78.2XXS ALLERGY/ASTHMA ADULT REFERRAL   2. Allergic state, subsequent encounter T78.40XD        Patient Instructions   Please complete your prednisone 20mg daily, through Friday, 7/20/18.  Please also restart Pepcid as 20mg daily and take it for 3 more days.  Please call to schedule an appointment with the Allergist, to be seen before your planned [place] trip: [phone].          Joann Banda MD    [facility]  [id number] [place] [id number] [phone]

## 2018-07-18 ENCOUNTER — OFFICE VISIT (OUTPATIENT)
Dept: INTERNAL MEDICINE | Facility: CLINIC | Age: 58
End: 2018-07-18
Payer: COMMERCIAL

## 2018-07-18 VITALS
BODY MASS INDEX: 24.18 KG/M2 | OXYGEN SATURATION: 100 % | SYSTOLIC BLOOD PRESSURE: 170 MMHG | WEIGHT: 142 LBS | DIASTOLIC BLOOD PRESSURE: 80 MMHG | HEART RATE: 63 BPM | TEMPERATURE: 97.7 F

## 2018-07-18 DIAGNOSIS — T78.40XD ALLERGIC STATE, SUBSEQUENT ENCOUNTER: ICD-10-CM

## 2018-07-18 DIAGNOSIS — T78.2XXS ANAPHYLAXIS, SEQUELA: Primary | ICD-10-CM

## 2018-07-18 PROCEDURE — 99214 OFFICE O/P EST MOD 30 MIN: CPT | Performed by: INTERNAL MEDICINE

## 2018-07-18 RX ORDER — PREDNISONE 20 MG/1
20 TABLET ORAL
COMMUNITY
Start: 2018-07-15 | End: 2022-05-23

## 2018-07-18 NOTE — PATIENT INSTRUCTIONS
Please complete your prednisone 20mg daily, through Friday, 7/20/18.  Please also restart Pepcid as 20mg daily and take it for 3 more days.  Please call to schedule an appointment with the Allergist, to be seen before your planned [place] trip: [phone].

## 2018-07-18 NOTE — MR AVS SNAPSHOT
After Visit Summary   7/18/2018    Kaleigh Tyson    MRN: [id number]           Patient Information     Date Of Birth          1960        Visit Information        Provider Department      7/18/2018 5:00 PM Joann Banda MD [facility]        Today's Diagnoses     Anaphylaxis, sequela    -  1      Care Instructions    Please complete your prednisone 20mg daily, through Friday, 7/20/18.  Please also restart Pepcid as 20mg daily and take it for 3 more days.  Please call to schedule an appointment with the Allergist, to be seen before your planned [place] trip: [phone].              Follow-ups after your visit        Additional Services     ALLERGY/ASTHMA ADULT REFERRAL       Your provider has referred you to: [facility]: [facility]  [phone] [email or web address]  [facility]: [facility] [phone] [email or web address]  [facility]: [facility] [phone]  [email or web address]  [facility]: [facility] [phone] [email or web address]    Please be aware that coverage of these services is subject to the terms and limitations of your health insurance plan.  Call member services at your health plan with any benefit or coverage questions.      Please bring the following with you to your appointment:    (1) Any X-Rays, CTs or MRIs which have been performed.  Contact the facility where they were done to arrange for  prior to your scheduled appointment.    (2) List of current medications  (3) This referral request   (4) Any documents/labs given to you for this referral                  Who to contact     If you have questions or need follow up information about today's clinic visit or your schedule please contact [facility] directly at [phone].  Normal or non-critical lab and imaging results  will be communicated to you by MyChart, letter or phone within 4 business days after the clinic has received the results. If you do not hear from us within 7 days, please contact the clinic through MyChart or phone. If you have a critical or abnormal lab result, we will notify you by phone as soon as possible.  Submit refill requests through [employer] or call your pharmacy and they will forward the refill request to us. Please allow 3 business days for your refill to be completed.          Additional Information About Your Visit        Care EveryWhere ID     This is your Care EveryWhere ID. This could be used by other organizations to access your [facility] medical records  [id number]        Your Vitals Were     Pulse Temperature Pulse Oximetry BMI (Body Mass Index)          63 97.7  F (36.5  C) (Oral) 100% 24.18 kg/m2         Blood Pressure from Last 3 Encounters:   07/18/18 170/80   07/09/18 138/80   07/02/18 160/90    Weight from Last 3 Encounters:   07/18/18 142 lb (64.4 kg)   07/09/18 145 lb (65.8 kg)   07/02/18 145 lb (65.8 kg)              We Performed the Following     ALLERGY/ASTHMA ADULT REFERRAL        Primary Care Provider Office Phone # Fax #    [facility] [phone] [address] [facility] [address]        Equal Access to Services     KARRIE BRADLEY : Hadii aad ku hadasho Soomaali, waaxda luqadaha, qaybta kaalmada adeegyada, gloria tomlinson . So [facility] [phone].    ATENCIÓN: Si habla español, tiene a pelaez disposición servicios gratuitos de asistencia lingüística. Llame al [phone].    We comply with applicable federal civil rights laws and [place] laws. We do not discriminate on the basis of race, color, national origin, age, disability, sex, sexual orientation, or gender identity.            Thank you!     Thank you for choosing [facility]  for your care. Our goal is always to provide you with excellent care. Hearing back  from our patients is one way we can continue to improve our services. Please take a few minutes to complete the written survey that you may receive in the mail after your visit with us. Thank you!             Your Updated Medication List - Protect others around you: Learn how to safely use, store and throw away your medicines at www.disposemymeds.org.          This list is accurate as of 7/18/18  5:16 PM.  Always use your most recent med list.                   Brand Name Dispense Instructions for use Diagnosis    EPINEPHrine 0.3 MG/0.3ML injection 2-pack    EPIPEN/ADRENACLICK/or ANY BX GENERIC EQUIV    0.6 mL    Inject 0.3 mLs (0.3 mg) into the muscle as needed for anaphylaxis    Anaphylactic reaction to bee sting, accidental or unintentional, subsequent encounter       predniSONE 20 MG tablet    DELTASONE     Take 20 mg by mouth

## 2018-07-18 NOTE — NURSING NOTE
"Chief Complaint   Patient presents with     ER F/U     [facility] 7/3, Allergic Reaction, and this am      Health Maintenance     ACP       Initial /84  Pulse 63  Temp 97.7  F (36.5  C) (Oral)  Wt 142 lb (64.4 kg)  SpO2 100%  BMI 24.18 kg/m2 Estimated body mass index is 24.18 kg/(m^2) as calculated from the following:    Height as of 3/19/18: 5' 4.25\" (1.632 m).    Weight as of this encounter: 142 lb (64.4 kg).  Medication Reconciliation: complete    Damari Mosqueda CMA      "

## 2018-07-23 ENCOUNTER — OFFICE VISIT (OUTPATIENT)
Dept: ALLERGY | Facility: CLINIC | Age: 58
End: 2018-07-23
Payer: COMMERCIAL

## 2018-07-23 VITALS
DIASTOLIC BLOOD PRESSURE: 80 MMHG | BODY MASS INDEX: 24.53 KG/M2 | SYSTOLIC BLOOD PRESSURE: 140 MMHG | WEIGHT: 144 LBS | HEART RATE: 70 BPM | TEMPERATURE: 98.4 F | RESPIRATION RATE: 16 BRPM | OXYGEN SATURATION: 100 %

## 2018-07-23 DIAGNOSIS — I10 BENIGN ESSENTIAL HYPERTENSION: ICD-10-CM

## 2018-07-23 DIAGNOSIS — T78.2XXD ANAPHYLAXIS, SUBSEQUENT ENCOUNTER: Primary | ICD-10-CM

## 2018-07-23 PROCEDURE — 86003 ALLG SPEC IGE CRUDE XTRC EA: CPT | Performed by: ALLERGY & IMMUNOLOGY

## 2018-07-23 PROCEDURE — 36415 COLL VENOUS BLD VENIPUNCTURE: CPT | Performed by: ALLERGY & IMMUNOLOGY

## 2018-07-23 PROCEDURE — 83520 IMMUNOASSAY QUANT NOS NONAB: CPT | Performed by: ALLERGY & IMMUNOLOGY

## 2018-07-23 PROCEDURE — 99244 OFF/OP CNSLTJ NEW/EST MOD 40: CPT | Mod: 25 | Performed by: ALLERGY & IMMUNOLOGY

## 2018-07-23 NOTE — MR AVS SNAPSHOT
After Visit Summary   7/23/2018    Kaleigh Tyson    MRN: [id number]           Patient Information     Date Of Birth          1960        Visit Information        Provider Department      7/23/2018 11:20 AM Modesto Estes DO [facility]        Today's Diagnoses     Anaphylaxis, subsequent encounter    -  1      Care Instructions    Allergy Staff Appt Hours Shot Hours Locations    Physician     Modesto Estes DO       Support Staff     Cynthia SABA RN      June SABA, [facility]  Monday:                      [place] 8-7     Tuesday:         [place] 8-5     Wednesday:        [place]: 7-5     Friday:        [place] 7-5   [place]        Monday: 9-5:50        Wednesday: 2-5:50        Friday: 7-12:50     [place]        Tuesday: 7-10:50        Thursday: 1:30-6:30     [place] Monday: 7:10-4:50        Tuesday: 12:30-6:30        Thursday: 7-11:50 [facility]  [address] [place] [address]  Appt Line: [phone]  Allergy RN (Monday):  [phone]    [facility]  [address] [place] [address]  Appt Line: [phone]  Allergy RN (Tues & Wed):  [phone]    [facility]  [address] [place] [address]  Appt Line: [phone]  Allergy RN (Friday):  [phone]       Important Scheduling Information  Aspirin Desensitization: Appt will last 2 clinic days. Please call the Allergy RN line for your clinic to schedule. Discontinue antihistamines 7 days prior to the appointment.     Food Challenges: Appt will last 3-4 hours. Please call the Allergy RN line for your clinic to schedule. Discontinue antihistamines 7 days prior to the appointment.     Penicillin Testing: Appt will last 2-3 hours. Please call the Allergy RN line for your clinic to schedule. Discontinue antihistamines 7 days prior to the appointment.     Skin Testing: Appt will about 40 minutes. Call the appointment line for your clinic to schedule. Discontinue antihistamines 7 days  prior to the appointment.     Venom Testing: Appt will last 2-3 hours. Please call the Allergy RN line for your clinic to schedule. Discontinue antihistamines 7 days prior to the appointment.     Thank you for trusting us with your Allergy, Asthma, and Immunology care. Please feel free to contact us with any questions or concerns you may have.      - Blood testing today. Avoid insect stings.   - If positive testing consider venom allergy shots.   - Follow up with a primary care physician for blood pressure.                Follow-ups after your visit        Who to contact     If you have questions or need follow up information about today's clinic visit or your schedule please contact [facility] directly at [phone].  Normal or non-critical lab and imaging results will be communicated to you by MyChart, letter or phone within 4 business days after the clinic has received the results. If you do not hear from us within 7 days, please contact the clinic through [employer] or phone. If you have a critical or abnormal lab result, we will notify you by phone as soon as possible.  Submit refill requests through [employer] or call your pharmacy and they will forward the refill request to us. Please allow 3 business days for your refill to be completed.          Additional Information About Your Visit        Care EveryWhere ID     This is your Care EveryWhere ID. This could be used by other organizations to access your [facility] medical records  [id number]        Your Vitals Were     Pulse Temperature Respirations Pulse Oximetry BMI (Body Mass Index)       70 98.4  F (36.9  C) (Oral) 16 100% 24.53 kg/m2        Blood Pressure from Last 3 Encounters:   07/23/18 142/90   07/18/18 170/80   07/09/18 138/80    Weight from Last 3 Encounters:   07/23/18 65.3 kg (144 lb)   07/18/18 64.4 kg (142 lb)   07/09/18 65.8 kg (145 lb)              We Performed the Following     Allergen common wasp IgE     Allergen honeybee IgE      Allergen paper wasp IgE     Allergen whiteface hornet IgE     Allergen yellow hornet IgE     Tryptase        Primary Care Provider Office Phone # Fax #    [facility] [phone] [address] [facility] [address]        Equal Access to Services     KARRIE BRADLEY : Hadii aad ku hadasho Soomaali, waaxda luqadaha, qaybta kaalmada adeegyada, waxay idiin hayaan adeeg kharash la'anson jimenez. So [facility] [phone].    ATENCIÓN: Si habla español, tiene a pelaez disposición servicios gratuitos de asistencia lingüística. Federicoame al [phone].    We comply with applicable federal civil rights laws and [place] laws. We do not discriminate on the basis of race, color, national origin, age, disability, sex, sexual orientation, or gender identity.            Thank you!     Thank you for choosing [facility]  for your care. Our goal is always to provide you with excellent care. Hearing back from our patients is one way we can continue to improve our services. Please take a few minutes to complete the written survey that you may receive in the mail after your visit with us. Thank you!             Your Updated Medication List - Protect others around you: Learn how to safely use, store and throw away your medicines at www.disposemymeds.org.          This list is accurate as of 7/23/18 11:44 AM.  Always use your most recent med list.                   Brand Name Dispense Instructions for use Diagnosis    EPINEPHrine 0.3 MG/0.3ML injection 2-pack    EPIPEN/ADRENACLICK/or ANY BX GENERIC EQUIV    0.6 mL    Inject 0.3 mLs (0.3 mg) into the muscle as needed for anaphylaxis    Anaphylactic reaction to bee sting, accidental or unintentional, subsequent encounter

## 2018-07-23 NOTE — PATIENT INSTRUCTIONS
Allergy Staff Appt Hours Shot Hours Locations    Physician     Modesto Estes, DO       Support Staff     Cynthia SABA RN      June SABA, [facility]  Monday:                      [place] 8-7     Tuesday:         [place] 8-5     Wednesday:        [place]: 7-5     Friday:        [place] 7-5   [place]        Monday: 9-5:50        Wednesday: 2-5:50        Friday: 7-12:50     [place]        Tuesday: 7-10:50        Thursday: 1:30-6:30     [place] Monday: 7:10-4:50        Tuesday: 12:30-6:30        Thursday: 7-11:50 [facility]  [address] [place] [address]  Appt Line: [phone]  Allergy RN (Monday):  [phone]    [facility]  [address] [place] [address]  Appt Line: [phone]  Allergy RN (Tues & Wed):  [phone]    [facility]  [address] [place] [address]  Appt Line: [phone]  Allergy RN (Friday):  [phone]       Important Scheduling Information  Aspirin Desensitization: Appt will last 2 clinic days. Please call the Allergy RN line for your clinic to schedule. Discontinue antihistamines 7 days prior to the appointment.     Food Challenges: Appt will last 3-4 hours. Please call the Allergy RN line for your clinic to schedule. Discontinue antihistamines 7 days prior to the appointment.     Penicillin Testing: Appt will last 2-3 hours. Please call the Allergy RN line for your clinic to schedule. Discontinue antihistamines 7 days prior to the appointment.     Skin Testing: Appt will about 40 minutes. Call the appointment line for your clinic to schedule. Discontinue antihistamines 7 days prior to the appointment.     Venom Testing: Appt will last 2-3 hours. Please call the Allergy RN line for your clinic to schedule. Discontinue antihistamines 7 days prior to the appointment.     Thank you for trusting us with your Allergy, Asthma, and Immunology care. Please feel free to contact us with any questions or concerns you may have.      - Blood testing  today. Avoid insect stings.   - If positive testing consider venom allergy shots.   - Follow up with a primary care physician for blood pressure.

## 2018-07-23 NOTE — PROGRESS NOTES
Kaleigh Tyson is a 57 year old White female with no previous medical history. Kaleigh Tyson is being seen today for evaluation of anaphylaxis. The patient is being seen in consultation at the request of Dr. Veronika MD.     The patient reports that in 2016 well mowing the lawn she was stung by a flying stinging insect that she believes to be a wasp.  Within 5 minutes she developed ocular swelling, congestion, diffuse urticaria, shortness of breath, hoarseness and a sensation of throat closure.  Seen in emergency department.  Treated with prednisone and possibly epinephrine.  She was not admitted.  Symptoms resolved.  She carries injectable epinephrine.  No history of venom allergy testing.  No history of venom immunotherapy.  No history of mastocytosis.  The patient reports that one week ago on Sunday she was out working in the yard and after working in the yard she developed tachycardia, vomiting, abdominal pain, diarrhea, diffuse hives, hoarseness, shortness of breath and she felt like she was going to pass out.  EMS was called.  EMS administered epinephrine.  This was carefully beneficial.  She was observed in the emergency department. I reviewed ER visit from June of 2018.  She does not recall an insect sting from this day.  No other clear triggers were identified including foods, medications, herbal supplements.  She was mowing around a pine tree immediately prior to symptoms developing.    The patient has no history of asthma, eczema, food allergies, medications allergies or hives.     ENVIRONMENTAL HISTORY: [place] The [place] is heated with a forced air. They does have central air conditioning. The patient's bedroom is furnished with carpeting in bedroom.  Pets inside the house include 0 pets. There is history of cockroach or mice infestation. There is/are 1 smokers in the house.  The house does not have a damp basement.     Past Medical History:   Diagnosis Date      Elevated cholesterol     Low HDL - 41 in 1/09     Family History   Problem Relation Age of Onset     HEART DISEASE Father      Dementia Father      Neurologic Disorder Brother      Lipids Sister      Lipids Sister      Myocardial Infarction Mother      Hypertension Mother      Past Surgical History:   Procedure Laterality Date     NO HISTORY OF SURGERY         REVIEW OF SYSTEMS:  General: negative for weight gain. negative for weight loss. negative for changes in sleep.   Ears: negative for fullness. negative for hearing loss. negative for dizziness.   Nose: negative for snoring.negative for changes in smell. negative for drainage.   Eyes: negative for eye watering. negative for eye itching. negative for vision changes. negative for eye redness.  Throat: negative for hoarseness. negative for sore throat. negative for trouble swallowing.   Lungs: negative for shortness of breath.negative for wheezing. negative for sputum production.   Cardiovascular: negative for chest pain. negative for swelling of ankles. negative for fast or irregular heartbeat.   Gastrointestinal: negative for nausea. negative for heartburn. negative for acid reflux.   Musculoskeletal: positive  for joint pain. negative for joint stiffness. negative for joint swelling.   Neurologic: negative for seizures. negative for fainting. negative for weakness.   Psychiatric: negative for changes in mood. positive  for anxiety.   Endocrine: negative for cold intolerance. negative for heat intolerance. negative for tremors.   Lymphatic: negative for lower extremity swelling. negative for lymph node swelling.   Hematologic: negative for easy bruising. negative for easy bleeding.  Integumentary: negative for rash. negative for scaling. negative for nail changes.       Current Outpatient Prescriptions:      EPINEPHrine (EPIPEN/ADRENACLICK/OR ANY BX GENERIC EQUIV) 0.3 MG/0.3ML injection 2-pack, Inject 0.3 mLs (0.3 mg) into the muscle as needed for  anaphylaxis, Disp: 0.6 mL, Rfl: 11  Immunization History   Administered Date(s) Administered     TD (ADULT, 7+) 06/16/2004     TDAP Vaccine (Adacel) 08/02/2016     Allergies   Allergen Reactions     Bees Hives and Swelling         EXAM:   Constitutional:  Appears well-developed and well-nourished. No distress.   HEENT:   Head: Normocephalic.   Mouth/Throat: No oropharyngeal exudate present.   No cobblestoning of posterior oropharynx.   Nasal tissue pink and normal appearing.  No rhinorrhea noted.    Eyes: Conjunctivae are non-erythematous   Cardiovascular: Normal rate, regular rhythm and normal heart sounds. Exam reveals no gallop and no friction rub.   No murmur heard.  Respiratory: Effort normal and breath sounds normal. No respiratory distress. No wheezes. No rales.   Musculoskeletal: Normal range of motion.   Neuro: Oriented to person, place, and time.  Skin: Skin is warm and dry. No rash noted.   Psychiatric: Normal mood and affect.     Nursing note and vitals reviewed.    ASSESSMENT/PLAN:  Problem List Items Addressed This Visit        Circulatory    Benign essential hypertension     Patient to follow up with Primary Care provider regarding elevated blood pressure.              Other    Anaphylaxis, subsequent encounter - Primary     History of anaphylaxis in 2016 after insect sting.  Recent episode of anaphylaxis last week.  No clear insect sting.  Epinephrine was beneficial.  No other triggers were identified.  She has injectable epinephrine.  Epinephrine was significant beneficial for reaction.    -Unclear if most recent reaction was secondary to flying stinging insect.  She does not recall getting stung.  However reaction began while outside mowing the lawn.  No other clear triggers identified.  History of systemic reaction after insect sting in 2016.  -Continue to avoid flying stinging insects.   -Venom blood testing as noted. Serum Tryptase also sent.   -Anaphylaxis action plan was provided and reviewed  with the patient.  When and how to use injectable epinephrine was discussed.  -If the patient is allergic to venom would recommend venom immunotherapy.  Based on her history she has about a 50% chance of having a systemic reaction upon repeat testing.  Venom immunotherapy is  percent efficacious.           Relevant Orders    Allergen honeybee IgE    Allergen paper wasp IgE    Allergen whiteface hornet IgE    Allergen yellow hornet IgE    Allergen common wasp IgE    Tryptase          Chart documentation with Dragon Voice recognition Software. Although reviewed after completion, some words and grammatical errors may remain.    Modesto Estes,    Allergy/Immunology  [facility], [place]

## 2018-07-23 NOTE — NURSING NOTE
RN reviewed Anaphylaxis Action Plan with patient. Educated on the symptoms and treatment of anaphylaxis. Went through the different ways that a reaction can present, and the body systems that it can affect. Patient verbalized understanding.     Writer demonstrated how to use the AdrenClick epinephrine auto-injector.  Patient was instructed to remove auto-injector from casing.  Patient instructed to form a fist around the auto-injector, remove caps labeled 1 and then 2 (never placing finger/thumb over ), then firmly push red tip against outer thigh, holding approximately 10 seconds.  Patient advised that once used, needle WILL be exposed.  Patient is to properly dispose of needle in sharps container and not regular trash can.  Patient advised to call 911 or go to emergency department after epi-pen use for further monitoring.         Cynthia Dubose RN

## 2018-07-23 NOTE — ASSESSMENT & PLAN NOTE
History of anaphylaxis in 2016 after insect sting.  Recent episode of anaphylaxis last week.  No clear insect sting.  Epinephrine was beneficial.  No other triggers were identified.  She has injectable epinephrine.  Epinephrine was significant beneficial for reaction.    -Unclear if most recent reaction was secondary to flying stinging insect.  She does not recall getting stung.  However reaction began while outside mowing the lawn.  No other clear triggers identified.  History of systemic reaction after insect sting in 2016.  -Continue to avoid flying stinging insects.   -Venom blood testing as noted. Serum Tryptase also sent.   -Anaphylaxis action plan was provided and reviewed with the patient.  When and how to use injectable epinephrine was discussed.  -If the patient is allergic to venom would recommend venom immunotherapy.  Based on her history she has about a 50% chance of having a systemic reaction upon repeat testing.  Venom immunotherapy is  percent efficacious.

## 2018-07-23 NOTE — LETTER
7/23/2018         RE: Kaleigh Tyson  [address] [facility] [address]        Dear Colleague,    Thank you for referring your patient, Kaleigh Tyson, to the [facility]. Please see a copy of my visit note below.    Kaleigh Tyson is a 57 year old White female with no previous medical history. Kaleigh Tyson is being seen today for evaluation of anaphylaxis. The patient is being seen in consultation at the request of Dr. Veronika MD.     The patient reports that in 2016 well mowing the lawn she was stung by a flying stinging insect that she believes to be a wasp.  Within 5 minutes she developed ocular swelling, congestion, diffuse urticaria, shortness of breath, hoarseness and a sensation of throat closure.  Seen in emergency department.  Treated with prednisone and possibly epinephrine.  She was not admitted.  Symptoms resolved.  She carries injectable epinephrine.  No history of venom allergy testing.  No history of venom immunotherapy.  No history of mastocytosis.  The patient reports that one week ago on Sunday she was out working in the yard and after working in the yard she developed tachycardia, vomiting, abdominal pain, diarrhea, diffuse hives, hoarseness, shortness of breath and she felt like she was going to pass out.  EMS was called.  EMS administered epinephrine.  This was carefully beneficial.  She was observed in the emergency department. I reviewed ER visit from June of 2018.  She does not recall an insect sting from this day.  No other clear triggers were identified including foods, medications, herbal supplements.  She was mowing around a pine tree immediately prior to symptoms developing.    The patient has no history of asthma, eczema, food allergies, medications allergies or hives.     ENVIRONMENTAL HISTORY: [place] The home is heated with a forced air. They does have central air conditioning. The patient's bedroom is furnished  with carpeting in bedroom.  Pets inside the house include 0 pets. There is history of cockroach or mice infestation. There is/are 1 smokers in the house.  The house does not have a damp basement.     Past Medical History:   Diagnosis Date     Elevated cholesterol     Low HDL - 41 in 1/09     Family History   Problem Relation Age of Onset     HEART DISEASE Father      Dementia Father      Neurologic Disorder Brother      Lipids Sister      Lipids Sister      Myocardial Infarction Mother      Hypertension Mother      Past Surgical History:   Procedure Laterality Date     NO HISTORY OF SURGERY         REVIEW OF SYSTEMS:  General: negative for weight gain. negative for weight loss. negative for changes in sleep.   Ears: negative for fullness. negative for hearing loss. negative for dizziness.   Nose: negative for snoring.negative for changes in smell. negative for drainage.   Eyes: negative for eye watering. negative for eye itching. negative for vision changes. negative for eye redness.  Throat: negative for hoarseness. negative for sore throat. negative for trouble swallowing.   Lungs: negative for shortness of breath.negative for wheezing. negative for sputum production.   Cardiovascular: negative for chest pain. negative for swelling of ankles. negative for fast or irregular heartbeat.   Gastrointestinal: negative for nausea. negative for heartburn. negative for acid reflux.   Musculoskeletal: positive  for joint pain. negative for joint stiffness. negative for joint swelling.   Neurologic: negative for seizures. negative for fainting. negative for weakness.   Psychiatric: negative for changes in mood. positive  for anxiety.   Endocrine: negative for cold intolerance. negative for heat intolerance. negative for tremors.   Lymphatic: negative for lower extremity swelling. negative for lymph node swelling.   Hematologic: negative for easy bruising. negative for easy bleeding.  Integumentary: negative for rash.  negative for scaling. negative for nail changes.       Current Outpatient Prescriptions:      EPINEPHrine (EPIPEN/ADRENACLICK/OR ANY BX GENERIC EQUIV) 0.3 MG/0.3ML injection 2-pack, Inject 0.3 mLs (0.3 mg) into the muscle as needed for anaphylaxis, Disp: 0.6 mL, Rfl: 11  Immunization History   Administered Date(s) Administered     TD (ADULT, 7+) 06/16/2004     TDAP Vaccine (Adacel) 08/02/2016     Allergies   Allergen Reactions     Bees Hives and Swelling         EXAM:   Constitutional:  Appears well-developed and well-nourished. No distress.   HEENT:   Head: Normocephalic.   Mouth/Throat: No oropharyngeal exudate present.   No cobblestoning of posterior oropharynx.   Nasal tissue pink and normal appearing.  No rhinorrhea noted.    Eyes: Conjunctivae are non-erythematous   Cardiovascular: Normal rate, regular rhythm and normal heart sounds. Exam reveals no gallop and no friction rub.   No murmur heard.  Respiratory: Effort normal and breath sounds normal. No respiratory distress. No wheezes. No rales.   Musculoskeletal: Normal range of motion.   Neuro: Oriented to person, place, and time.  Skin: Skin is warm and dry. No rash noted.   Psychiatric: Normal mood and affect.     Nursing note and vitals reviewed.    ASSESSMENT/PLAN:  Problem List Items Addressed This Visit        Circulatory    Benign essential hypertension     Patient to follow up with Primary Care provider regarding elevated blood pressure.              Other    Anaphylaxis, subsequent encounter - Primary     History of anaphylaxis in 2016 after insect sting.  Recent episode of anaphylaxis last week.  No clear insect sting.  Epinephrine was beneficial.  No other triggers were identified.  She has injectable epinephrine.  Epinephrine was significant beneficial for reaction.    -Unclear if most recent reaction was secondary to flying stinging insect.  She does not recall getting stung.  However reaction began while outside mowing the lawn.  No other clear  triggers identified.  History of systemic reaction after insect sting in 2016.  -Continue to avoid flying stinging insects.   -Venom blood testing as noted. Serum Tryptase also sent.   -Anaphylaxis action plan was provided and reviewed with the patient.  When and how to use injectable epinephrine was discussed.  -If the patient is allergic to venom would recommend venom immunotherapy.  Based on her history she has about a 50% chance of having a systemic reaction upon repeat testing.  Venom immunotherapy is  percent efficacious.           Relevant Orders    Allergen honeybee IgE    Allergen paper wasp IgE    Allergen whiteface hornet IgE    Allergen yellow hornet IgE    Allergen common wasp IgE    Tryptase          Chart documentation with Dragon Voice recognition Software. Although reviewed after completion, some words and grammatical errors may remain.    Modesto Estes,    Allergy/Immunology  [facility], [place] and [place], [place]      Again, thank you for allowing me to participate in the care of your patient.        Sincerely,        Modesto Estes, DO

## 2018-07-24 ENCOUNTER — TELEPHONE (OUTPATIENT)
Dept: ALLERGY | Facility: CLINIC | Age: 58
End: 2018-07-24

## 2018-07-24 DIAGNOSIS — T78.2XXD ANAPHYLAXIS DUE TO HYMENOPTERA VENOM, ACCIDENTAL OR UNINTENTIONAL, SUBSEQUENT ENCOUNTER: Primary | ICD-10-CM

## 2018-07-24 DIAGNOSIS — T63.481D ANAPHYLAXIS DUE TO HYMENOPTERA VENOM, ACCIDENTAL OR UNINTENTIONAL, SUBSEQUENT ENCOUNTER: Primary | ICD-10-CM

## 2018-07-24 LAB
HONEY BEE IGE QN: <0.1 KU(A)/L
PAPER WASP IGE QN: 0.4 KU(A)/L
TRYPTASE SERPL-MCNC: 4.8 UG/L
WHITEFACED HORNET IGE QN: <0.1 KU(A)/L
YELLOW HORNET IGE QN: <0.1 KU(A)/L
YELLOW JACKET IGE QN: 0.24 KU(A)/L

## 2018-07-24 NOTE — PROGRESS NOTES
Allergy testing positive for wasp and yellow jacket.  I am going to recommend that she starts on venom immunotherapy for wasp and yellow jacket.  Please call and discussed venom immunotherapy with the patient.  She already has injectable epinephrine.  Let me know if she wishes to proceed with venom immunotherapy.  Options for venom immunotherapy include traditional build or cluster. Thanks.     Dr. Estes

## 2018-07-24 NOTE — TELEPHONE ENCOUNTER
Patient called in and wishes to start venom immunotherapy (traditional).  Consent obtained over phone, patient will start immunotherapy on August 20th.  Patient understands she has to bring her epi-pen with to her appointment.  Forwarding to provider to order serums, thank you.    Cynthia Dubose RN

## 2018-07-25 NOTE — TELEPHONE ENCOUNTER
ALLERGY SOLUTION NEW REQUEST    Kaleigh Tyson 1960 MRN: [id number]    DATE NEEDED:  2 weeks  Vial Color Content   Top Dose         Vial Size  Blue 1:100, Yellow 1:10 and Red 1:1 Mixed Vespid  Red 1:1 1 5mL  Blue 1:100, Yellow 1:10 and Red 1:1 Wasp  Red 1:1 1 5mL          Shot Clinic Location:  [place]  Ship to Location: [place]  Special Instructions:  none        Requester Signature  Modesto Estes, DO

## 2018-08-03 DIAGNOSIS — T63.441D TOXIC EFFECT OF VENOM OF BEES, ACCIDENTAL OR UNINTENTIONAL, SUBSEQUENT ENCOUNTER: Primary | ICD-10-CM

## 2018-08-03 PROCEDURE — 95148 ANTIGEN THERAPY SERVICES: CPT | Performed by: ALLERGY & IMMUNOLOGY

## 2018-08-03 PROCEDURE — 95148 ANTIGEN THERAPY SERVICES: CPT | Mod: 59 | Performed by: ALLERGY & IMMUNOLOGY

## 2018-08-03 NOTE — TELEPHONE ENCOUNTER
Allergy serums received at [place].     Vials received below:    Vial Color Content                      Vial Size Expiration Date  Yellow 1:10 Mixed Vespid 5mL  7/27/2019  Blue 1:100 Mixed Vespid 5mL  7/27/2019  Red 1:1 Mixed Vespid 12mL  7/27/2019    Blue 1:100 Wasp 5mL  7/27/2019  Yellow 1:10 Wasp 5mL  7/27/2019  Red 1:1 Wasp 5mL  7/27/2019    Signature  Bharti Velez

## 2018-08-03 NOTE — PROGRESS NOTES
HPI:    I am seeing Kaleigh Tyson for the 1st time. she is a 58 year old female here to discuss:    Elevated BP without dx of HTN - she checks tid at home and gets around 130/80. Fairly normal today.   Evaluation and treatment:    No medications but I asked her to check 3 times per week instead of tid.   Counseling done about checking home blood pressure, diet and exercise    BP Readings from Last 6 Encounters:   08/07/18 130/80   07/23/18 140/80   07/18/18 170/80   07/09/18 138/80   07/02/18 160/90   03/19/18 138/76         Last Basic Metabolic Panel:  Lab Results   Component Value Date     08/02/2016      Lab Results   Component Value Date    POTASSIUM 4.1 08/02/2016     Lab Results   Component Value Date    CHLORIDE 105 08/02/2016     Lab Results   Component Value Date    GRACIELA 8.6 08/02/2016     Lab Results   Component Value Date    CO2 29 08/02/2016     Lab Results   Component Value Date    BUN 10 08/02/2016     Lab Results   Component Value Date    CR 0.87 08/02/2016     Lab Results   Component Value Date    GLC 82 08/02/2016       CBC RESULTS:   Recent Labs   Lab Test  08/02/16   0948   WBC  6.1   RBC  4.53   HGB  13.8   HCT  41.2   MCV  91   MCH  30.5   MCHC  33.5   RDW  13.4   PLT  283       Anaphylaxis -   Evaluation and treatment:    She was seen at ED and subsequenlty by allergy - she was found to be allergic to wasp and yellow jacket - immunotherapy was offered.   She has epi pen.    Right knee pain - this is very mild. No trauma. No repetitive activities. No swelling. She feels the right knee is bigger than the left.  Evaluation and treatment:    The exam is normal. Reassured.    Preventive - I asked her to come back for this in a fasting state.    Exam:    /80  Pulse 69  Temp 97.9  F (36.6  C) (Oral)  Resp 16  Wt 145 lb (65.8 kg)  SpO2 98%  Breastfeeding? No  BMI 24.7 kg/m2    Gen: Healthy appearing female in no acute distress  Neck: No enlarged lymph nodes, thyromegally or other  masses.  Lungs: Good air movement and otherwise clear.  CV: Heart RRR with no murmurs. No JVD, carotid bruits or leg edema.  MS: the right knee has full ROM without swelling. Provocative tests negative.    Assessment and Plan - Decision Making    1. Elevated blood pressure reading without diagnosis of hypertension  Per HPI    2. Right knee pain, unspecified chronicity  Per HPI      Written instructions given as follows:    Patient Instructions   1. If the right knee becomes painful or it limits your activities, please ask for orthopedic referral.    2. Your blood pressure is ok today. You don't have to check it 3 times per day. Please check 3 times per week.    3. Please come back for a physical with fasting labs.

## 2018-08-03 NOTE — PROGRESS NOTES
Allergy serums billed at [place].     Vials received below:    Vial Color Content                      Vial Size Expiration Date  Yellow 1:10 Mixed Vespid 5mL  7/27/2019  Blue 1:100 Mixed Vespid 5mL  7/27/2019  Red 1:1 Mixed Vespid 12mL  7/27/2019    Blue 1:100 Wasp 5mL  7/27/2019  Yellow 1:10 Wasp 5mL  7/27/2019  Red 1:1 Wasp 5mL  7/27/2019    Original Refill encounter date: 7/24/2018      Signature  Bharti Velez

## 2018-08-07 ENCOUNTER — TELEPHONE (OUTPATIENT)
Dept: ALLERGY | Facility: CLINIC | Age: 58
End: 2018-08-07

## 2018-08-07 ENCOUNTER — OFFICE VISIT (OUTPATIENT)
Dept: FAMILY MEDICINE | Facility: CLINIC | Age: 58
End: 2018-08-07
Payer: COMMERCIAL

## 2018-08-07 VITALS
TEMPERATURE: 97.9 F | DIASTOLIC BLOOD PRESSURE: 80 MMHG | BODY MASS INDEX: 24.7 KG/M2 | RESPIRATION RATE: 16 BRPM | SYSTOLIC BLOOD PRESSURE: 130 MMHG | OXYGEN SATURATION: 98 % | WEIGHT: 145 LBS | HEART RATE: 69 BPM

## 2018-08-07 DIAGNOSIS — M25.561 RIGHT KNEE PAIN, UNSPECIFIED CHRONICITY: ICD-10-CM

## 2018-08-07 DIAGNOSIS — R03.0 ELEVATED BLOOD PRESSURE READING WITHOUT DIAGNOSIS OF HYPERTENSION: Primary | ICD-10-CM

## 2018-08-07 PROCEDURE — 99214 OFFICE O/P EST MOD 30 MIN: CPT | Performed by: FAMILY MEDICINE

## 2018-08-07 NOTE — MR AVS SNAPSHOT
After Visit Summary   8/7/2018    Kaleigh Tyson    MRN: [id number]           Patient Information     Date Of Birth          1960        Visit Information        Provider Department      8/7/2018 1:30 PM James Osorio MD [facility]        Care Instructions    1. If the right knee becomes painful or it limits your activities, please ask for orthopedic referral.    2. Your blood pressure is ok today. You don't have to check it 3 times per day. Please check 3 times per week.    3. Please come back for a physical with fasting labs.          Follow-ups after your visit        Your next 10 appointments already scheduled     Aug 20, 2018 10:10 AM CDT   Nurse Only with AN ALLERGY SHOTS   [facility] [place] ([facility] [place])    [address]  [place] [address] [phone]            Sep 05, 2018  4:40 PM CDT   Nurse Only with AN ALLERGY SHOTS   [facility] [place] ([facility] [place])    [address]  [place] [address] [phone]            Sep 10, 2018  9:00 AM CDT   Nurse Only with AN ALLERGY SHOTS   [facility] [place] ([facility] [place])    [address]  [place] [address] [phone]              Who to contact     If you have questions or need follow up information about today's clinic visit or your schedule please contact [facility] directly at [phone].  Normal or non-critical lab and imaging results will be communicated to you by MyChart, letter or phone within 4 business days after the clinic has received the results. If you do not hear from us within 7 days, please contact the clinic through MyChart or phone. If you have a critical or abnormal lab result, we will notify you by phone as soon as possible.  Submit refill requests through [employer] or call your pharmacy and they will forward the refill request to us. Please allow 3 business days for your refill to be completed.           Additional Information About Your Visit        Care EveryWhere ID     This is your Care EveryWhere ID. This could be used by other organizations to access your [place] medical records  [id number]        Your Vitals Were     Pulse Temperature Respirations Pulse Oximetry Breastfeeding? BMI (Body Mass Index)    69 97.9  F (36.6  C) (Oral) 16 98% No 24.7 kg/m2       Blood Pressure from Last 3 Encounters:   08/07/18 130/80   07/23/18 140/80   07/18/18 170/80    Weight from Last 3 Encounters:   08/07/18 145 lb (65.8 kg)   07/23/18 144 lb (65.3 kg)   07/18/18 142 lb (64.4 kg)              Today, you had the following     No orders found for display       Primary Care Provider Office Phone # Fax #    Lauren Saez [phone] [address] [facility] [address]        Equal Access to Services     [facility] : Hadii aad ku hadasho Soomaali, waaxda luqadaha, qaybta kaalmada adeegyada, waxay cassandrain hayjaspaln sofya tomlinson . So [facility] [phone].    ATENCIÓN: Si habla español, tiene a pelaez disposición servicios gratuitos de asistencia lingüística. Llame al [phone].    We comply with applicable federal civil rights laws and [place] laws. We do not discriminate on the basis of race, color, national origin, age, disability, sex, sexual orientation, or gender identity.            Thank you!     Thank you for choosing [facility] [place]  for your care. Our goal is always to provide you with excellent care. Hearing back from our patients is one way we can continue to improve our services. Please take a few minutes to complete the written survey that you may receive in the mail after your visit with us. Thank you!             Your Updated Medication List - Protect others around you: Learn how to safely use, store and throw away your medicines at www.disposemymeds.org.          This list is accurate as of 8/7/18  1:35 PM.  Always use your most recent med list.                   Brand  Name Dispense Instructions for use Diagnosis    ALLERGEN IMMUNOTHERAPY PRESCRIPTION     12 mL    Wasp Venom 100 mcg/mL HS 12 ml Diluent: HSA qs to 5ml    Anaphylaxis due to hymenoptera venom, accidental or unintentional, subsequent encounter       ALLERGEN IMMUNOTHERAPY PRESCRIPTION     12 mL    Mixed Vespid Venom 300 mcg/mL HS 12 ml Diluent: HSA qs to 5ml    Anaphylaxis due to hymenoptera venom, accidental or unintentional, subsequent encounter       EPINEPHrine 0.3 MG/0.3ML injection 2-pack    EPIPEN/ADRENACLICK/or ANY BX GENERIC EQUIV    0.6 mL    Inject 0.3 mLs (0.3 mg) into the muscle as needed for anaphylaxis    Anaphylactic reaction to bee sting, accidental or unintentional, subsequent encounter

## 2018-08-07 NOTE — NURSING NOTE
"Chief Complaint   Patient presents with     Hypertension     HTN F/u     Knee Pain     R knee F/U     Health Maintenance     orders pended       Initial /80 (Cuff Size: Adult Regular)  Pulse 69  Temp 97.9  F (36.6  C) (Oral)  Resp 16  Wt 145 lb (65.8 kg)  SpO2 98%  Breastfeeding? No  BMI 24.7 kg/m2 Estimated body mass index is 24.7 kg/(m^2) as calculated from the following:    Height as of 3/19/18: 5' 4.25\" (1.632 m).    Weight as of this encounter: 145 lb (65.8 kg).      Sonia Yang LPN    "

## 2018-08-07 NOTE — PATIENT INSTRUCTIONS
1. If the right knee becomes painful or it limits your activities, please ask for orthopedic referral.    2. Your blood pressure is ok today. You don't have to check it 3 times per day. Please check 3 times per week.    3. Please come back for a physical with fasting labs.

## 2018-08-08 PROBLEM — R03.0 ELEVATED BLOOD PRESSURE READING WITHOUT DIAGNOSIS OF HYPERTENSION: Status: ACTIVE | Noted: 2018-08-08

## 2018-08-08 PROBLEM — M25.561 RIGHT KNEE PAIN, UNSPECIFIED CHRONICITY: Status: ACTIVE | Noted: 2018-08-08

## 2018-08-20 ENCOUNTER — ALLIED HEALTH/NURSE VISIT (OUTPATIENT)
Dept: ALLERGY | Facility: CLINIC | Age: 58
End: 2018-08-20
Payer: COMMERCIAL

## 2018-08-20 DIAGNOSIS — T63.441D TOXIC EFFECT OF VENOM OF BEES, UNINTENTIONAL, SUBSEQUENT ENCOUNTER: Primary | ICD-10-CM

## 2018-08-20 PROCEDURE — 95117 IMMUNOTHERAPY INJECTIONS: CPT

## 2018-08-20 PROCEDURE — 99207 ZZC DROP WITH A PROCEDURE: CPT

## 2018-08-20 NOTE — PROGRESS NOTES
Patient presented after waiting 30 minutes with no reaction to allergy injections. Discharged from clinic.    Tammi Do RN

## 2018-08-20 NOTE — PATIENT INSTRUCTIONS
You started your allergy injections today. These instructions were reviewed with you today:     Prior to visit patient should:   - Bring epinephrine auto-injector to every appointment, this is for patient safety  - We reviewed how to use your epinephrine auto-injector if needed  - If directed by your provider, take an over the counter anti-histamine at least 60 minutes prior to appointment (allegra, claritin, zyrtec are all options)   - This can be in addition to medications patient is already taking for allergy  symptoms    At time of visit:   -Patient will be asked a series of questions every time, reviewed these questions and implications.   -Patient must present epinephrine auto-injector  -Patient must wait 30 minutes post injections, reviewed this is for patient safety. Patient should watch the time/and return to injection room for assessment of sites before leaving.  -If patient were to experience signs of a systemic reaction (reviewed what these are), patient should present to injection room and notify nursing staff immediately   -Patient may not go to other appointments/activities within the clinic during the 30 minute wait time.     Post Visit:   -Reviewed signs of systemic reaction/anaphylaxis and what to do if these were to occur  -Reviewed signs of a normal local reaction and when to call the clinic  -Reviewed dosing schedule, and assisted with/or encouraged patient to schedule additional appointments  -Avoid avid vigorous activity from right before each injection until 2 hours after the injection     *Patient handout given with this information.     Anaphylaxis Action Plan for Immunotherapy Patients    There is risk of systemic reactions when receiving immunotherapy injections. Local reactions such as a wheal (hive) smaller than a quarter, redness, swelling, and soreness are common. If the wheal is greater than the size of a half dollar (3.4 cm) please contact our clinic (numbers below), as we will need  to adjust the dose that you receive at your next injection. Waiting until the next appointment to inform us of the reaction could increase the wait time that you experience, because your allergist will need to be contacted for new orders prior to giving the injection.  If you have symptoms not localized to the injection site, please follow the anaphylaxis plan, and contact our office to update after you have received emergency medical treatment. Please ask to speak to an Allergy RN with any questions or updates.     [facility]: [phone]  [facility]: [phone]  [facility]: [phone]  [place]: [phone]  [phone]

## 2018-08-20 NOTE — MR AVS SNAPSHOT
After Visit Summary   8/20/2018    Kaleigh Tyson    MRN: [id number]           Patient Information     Date Of Birth          1960        Visit Information        Provider Department      8/20/2018 10:10 AM AN ALLERGY SHOTS [facility] Instructions    You started your allergy injections today. These instructions were reviewed with you today:     Prior to visit patient should:   - Bring epinephrine auto-injector to every appointment, this is for patient safety  - We reviewed how to use your epinephrine auto-injector if needed  - If directed by your provider, take an over the counter anti-histamine at least 60 minutes prior to appointment (allegra, claritin, zyrtec are all options)   - This can be in addition to medications patient is already taking for allergy  symptoms    At time of visit:   -Patient will be asked a series of questions every time, reviewed these questions and implications.   -Patient must present epinephrine auto-injector  -Patient must wait 30 minutes post injections, reviewed this is for patient safety. Patient should watch the time/and return to injection room for assessment of sites before leaving.  -If patient were to experience signs of a systemic reaction (reviewed what these are), patient should present to injection room and notify nursing staff immediately   -Patient may not go to other appointments/activities within the clinic during the 30 minute wait time.     Post Visit:   -Reviewed signs of systemic reaction/anaphylaxis and what to do if these were to occur  -Reviewed signs of a normal local reaction and when to call the clinic  -Reviewed dosing schedule, and assisted with/or encouraged patient to schedule additional appointments  -Avoid avid vigorous activity from right before each injection until 2 hours after the injection     *Patient handout given with this information.     Anaphylaxis Action Plan for Immunotherapy Patients    There is  risk of systemic reactions when receiving immunotherapy injections. Local reactions such as a wheal (hive) smaller than a quarter, redness, swelling, and soreness are common. If the wheal is greater than the size of a half dollar (3.4 cm) please contact our clinic (numbers below), as we will need to adjust the dose that you receive at your next injection. Waiting until the next appointment to inform us of the reaction could increase the wait time that you experience, because your allergist will need to be contacted for new orders prior to giving the injection.  If you have symptoms not localized to the injection site, please follow the anaphylaxis plan, and contact our office to update after you have received emergency medical treatment. Please ask to speak to an Allergy RN with any questions or updates.     [facility]: [phone]  [facility]: [phone]  [facility]: [phone]  [place]: [phone]  [phone]                Follow-ups after your visit        Your next 10 appointments already scheduled     Aug 27, 2018 10:10 AM CDT   [facility] with [facility] ALLERGY SHOTS   [facility]    [address] [address] [phone]            Sep 05, 2018  4:40 PM CDT   Nurse Only with AN ALLERGY SHOTS   [facility]    [address] [address] [phone]            Sep 10, 2018  9:00 AM CDT   Nurse Only with AN ALLERGY SHOTS   [facility]    [address] [address] [phone]              Who to contact     If you have questions or need follow up information about today's clinic visit or your schedule please contact [facility] directly at [phone].  Normal or non-critical lab and imaging results will be communicated to you by MyChart, letter or phone within 4 business days after the clinic  has received the results. If you do not hear from us within 7 days, please contact the clinic through [employer] or phone. If you have a critical or abnormal lab result, we will notify you by phone as soon as possible.  Submit refill requests through [employer] or call your pharmacy and they will forward the refill request to us. Please allow 3 business days for your refill to be completed.          Additional Information About Your Visit        Care EveryWhere ID     This is your Care EveryWhere ID. This could be used by other organizations to access your [place] medical records  [id number]         Blood Pressure from Last 3 Encounters:   08/07/18 130/80   07/23/18 140/80   07/18/18 170/80    Weight from Last 3 Encounters:   08/07/18 65.8 kg (145 lb)   07/23/18 65.3 kg (144 lb)   07/18/18 64.4 kg (142 lb)              Today, you had the following     No orders found for display       Primary Care Provider Office Phone # Fax #    Lauren Taina Saez [phone] [address] [address] [address]        Equal Access to Services     [facility]: Hadii aad ku hadasho Somaricel, waaxda luqadaha, qaybta kaalmada jori, gloria tomlinson . So [facility] [phone].    ATENCIÓN: Si habla español, tiene a pelaez disposición servicios gratuitos de asistencia lingüística. [facility] [phone].    We comply with applicable federal civil rights laws and [place] laws. We do not discriminate on the basis of race, color, national origin, age, disability, sex, sexual orientation, or gender identity.            Thank you!     Thank you for choosing [facility]  for your care. Our goal is always to provide you with excellent care. Hearing back from our patients is one way we can continue to improve our services. Please take a few minutes to complete the written survey that you may receive in the mail after your visit with us. Thank you!             Your Updated Medication  List - Protect others around you: Learn how to safely use, store and throw away your medicines at www.disposemymeds.org.          This list is accurate as of 8/20/18 10:16 AM.  Always use your most recent med list.                   Brand Name Dispense Instructions for use Diagnosis    EPINEPHrine 0.3 MG/0.3ML injection 2-pack    EPIPEN/ADRENACLICK/or ANY BX GENERIC EQUIV    0.6 mL    Inject 0.3 mLs (0.3 mg) into the muscle as needed for anaphylaxis    Anaphylactic reaction to bee sting, accidental or unintentional, subsequent encounter       ORDER FOR ALLERGEN IMMUNOTHERAPY 5 mL vial     12 mL    Wasp Venom 100 mcg/mL HS 12 ml Diluent: HSA qs to 5ml    Anaphylaxis due to hymenoptera venom, accidental or unintentional, subsequent encounter       ORDER FOR ALLERGEN IMMUNOTHERAPY 5 mL vial     12 mL    Mixed Vespid Venom 300 mcg/mL HS 12 ml Diluent: HSA qs to 5ml    Anaphylaxis due to hymenoptera venom, accidental or unintentional, subsequent encounter

## 2018-08-27 ENCOUNTER — ALLIED HEALTH/NURSE VISIT (OUTPATIENT)
Dept: ALLERGY | Facility: CLINIC | Age: 58
End: 2018-08-27
Payer: COMMERCIAL

## 2018-08-27 DIAGNOSIS — T63.441D TOXIC EFFECT OF VENOM OF BEES, UNINTENTIONAL, SUBSEQUENT ENCOUNTER: Primary | ICD-10-CM

## 2018-08-27 PROCEDURE — 95117 IMMUNOTHERAPY INJECTIONS: CPT

## 2018-08-27 PROCEDURE — 99207 ZZC DROP WITH A PROCEDURE: CPT

## 2018-08-27 NOTE — NURSING NOTE
Patient presented after waiting 30 minutes with no reaction to allergy injections. Discharged from clinic.    Cynthia Dubose RN

## 2018-08-27 NOTE — MR AVS SNAPSHOT
After Visit Summary   8/27/2018    Kaleigh Tyson    MRN: [id number]           Patient Information     Date Of Birth          1960        Visit Information        Provider Department      8/27/2018 10:10 AM AN ALLERGY SHOTS [facility] [place]        Today's Diagnoses     Toxic effect of venom of bees, unintentional, subsequent encounter    -  1       Follow-ups after your visit        Your next 10 appointments already scheduled     Sep 05, 2018  4:40 PM CDT   Nurse Only with AN ALLERGY SHOTS   [facility] [place] ([facility] [place])    [address]  [place] [address]   [phone]            Sep 10, 2018  9:00 AM CDT   Nurse Only with AN ALLERGY SHOTS   [facility] [place] ([facility] [place])    [address]  [place] [address]   [phone]            Sep 17, 2018  1:30 PM CDT   Nurse Only with AN ALLERGY SHOTS   [facility] [place] ([facility] [place])    [address]  [place] [address]   [phone]            Sep 24, 2018  1:30 PM CDT   Nurse Only with AN ALLERGY SHOTS   [facility] [place] ([facility] [place])    [address]  [place] [address] [phone]              Who to contact     If you have questions or need follow up information about today's clinic visit or your schedule please contact [facility] directly at [phone].  Normal or non-critical lab and imaging results will be communicated to you by MyChart, letter or phone within 4 business days after the clinic has received the results. If you do not hear from us within 7 days, please contact the clinic through MyChart or phone. If you have a critical or abnormal lab result, we will notify you by phone as soon as possible.  Submit refill requests through [employer] or call your pharmacy and they will forward the refill request to us. Please allow 3 business days for your refill to be completed.          Additional  Information About Your Visit        Care EveryWhere ID     This is your Care EveryWhere ID. This could be used by other organizations to access your [place] medical records  [id number]         Blood Pressure from Last 3 Encounters:   08/07/18 130/80   07/23/18 140/80   07/18/18 170/80    Weight from Last 3 Encounters:   08/07/18 65.8 kg (145 lb)   07/23/18 65.3 kg (144 lb)   07/18/18 64.4 kg (142 lb)              We Performed the Following     Allergy Shot: Two or more injections        Primary Care Provider Office Phone # Fax #    Lauren Taina Saez [phone] [address] [facility] [address]        Equal Access to Services     [facility] : Hadii ruth stone hadasho Somaricel, waaxda luqadaha, qaybta kaalmada adeegyada, waxlisbeth tomlinson . So [facility] [phone].    ATENCIÓN: Si habla español, tiene a pelaez disposición servicios gratuitos de asistencia lingüística. [facility] [phone].    We comply with applicable federal civil rights laws and [place] laws. We do not discriminate on the basis of race, color, national origin, age, disability, sex, sexual orientation, or gender identity.            Thank you!     Thank you for choosing [facility]  for your care. Our goal is always to provide you with excellent care. Hearing back from our patients is one way we can continue to improve our services. Please take a few minutes to complete the written survey that you may receive in the mail after your visit with us. Thank you!             Your Updated Medication List - Protect others around you: Learn how to safely use, store and throw away your medicines at www.disposemymeds.org.          This list is accurate as of 8/27/18 10:27 AM.  Always use your most recent med list.                   Brand Name Dispense Instructions for use Diagnosis    EPINEPHrine 0.3 MG/0.3ML injection 2-pack    EPIPEN/ADRENACLICK/or ANY BX GENERIC EQUIV    0.6 mL    Inject 0.3  mLs (0.3 mg) into the muscle as needed for anaphylaxis    Anaphylactic reaction to bee sting, accidental or unintentional, subsequent encounter       ORDER FOR ALLERGEN IMMUNOTHERAPY 5 mL vial     12 mL    Wasp Venom 100 mcg/mL HS 12 ml Diluent: HSA qs to 5ml    Anaphylaxis due to hymenoptera venom, accidental or unintentional, subsequent encounter       ORDER FOR ALLERGEN IMMUNOTHERAPY 5 mL vial     12 mL    Mixed Vespid Venom 300 mcg/mL HS 12 ml Diluent: HSA qs to 5ml    Anaphylaxis due to hymenoptera venom, accidental or unintentional, subsequent encounter

## 2018-09-05 ENCOUNTER — ALLIED HEALTH/NURSE VISIT (OUTPATIENT)
Dept: ALLERGY | Facility: CLINIC | Age: 58
End: 2018-09-05
Payer: COMMERCIAL

## 2018-09-05 DIAGNOSIS — T63.441D TOXIC EFFECT OF VENOM OF BEES, UNINTENTIONAL, SUBSEQUENT ENCOUNTER: Primary | ICD-10-CM

## 2018-09-05 PROCEDURE — 95117 IMMUNOTHERAPY INJECTIONS: CPT

## 2018-09-05 PROCEDURE — 99207 ZZC DROP WITH A PROCEDURE: CPT

## 2018-09-05 NOTE — MR AVS SNAPSHOT
After Visit Summary   9/5/2018    Kaleigh Tyson    MRN: [id number]           Patient Information     Date Of Birth          1960        Visit Information        Provider Department      9/5/2018 4:40 PM AN ALLERGY SHOTS [facility] [place]        Today's Diagnoses     Toxic effect of venom of bees, unintentional, subsequent encounter    -  1       Follow-ups after your visit        Your next 10 appointments already scheduled     Sep 10, 2018  9:00 AM CDT   Nurse Only with AN ALLERGY SHOTS   [facility] [place] ([facility] [place])    [id number] [facility]  [place] [address] [phone]            Sep 17, 2018  1:30 PM CDT   Nurse Only with AN ALLERGY SHOTS   [facility] [place] ([facility] [place])    [address] [facility] [facility]  [place] [address] [phone]            Sep 24, 2018  1:30 PM CDT   Nurse Only with AN ALLERGY SHOTS   [facility] [place] ([facility] [place])    [id number] [facility]  [place] [address] [phone]              Who to contact     If you have questions or need follow up information about today's clinic visit or your schedule please contact [facility] directly at [phone].  Normal or non-critical lab and imaging results will be communicated to you by MyChart, letter or phone within 4 business days after the clinic has received the results. If you do not hear from us within 7 days, please contact the clinic through MyChart or phone. If you have a critical or abnormal lab result, we will notify you by phone as soon as possible.  Submit refill requests through Anacomp or call your pharmacy and they will forward the refill request to us. Please allow 3 business days for your refill to be completed.          Additional Information About Your Visit        Care EveryWhere ID     This is your Care EveryWhere ID. This could be used by other organizations to access your [place] medical records  [id number]          Blood Pressure from Last 3 Encounters:   08/07/18 130/80   07/23/18 140/80   07/18/18 170/80    Weight from Last 3 Encounters:   08/07/18 65.8 kg (145 lb)   07/23/18 65.3 kg (144 lb)   07/18/18 64.4 kg (142 lb)              We Performed the Following     Allergy Shot: Two or more injections        Primary Care Provider Office Phone # Fax #    Lauren Taina Saez [phone] [address] [facility] [address]        Equal Access to Services     [facility]: Hadii aad ku hadasho Soomaali, waaxda luqadaha, qaybta kaalmada adeegyada, waxlisbeth tapia hayanson tomlinson . So [facility] [phone].    ATENCIÓN: Si habla español, tiene a pelaez disposición servicios gratuitos de asistencia lingüística. [facility] [phone].    We comply with applicable federal civil rights laws and [place] laws. We do not discriminate on the basis of race, color, national origin, age, disability, sex, sexual orientation, or gender identity.            Thank you!     Thank you for choosing [facility]  for your care. Our goal is always to provide you with excellent care. Hearing back from our patients is one way we can continue to improve our services. Please take a few minutes to complete the written survey that you may receive in the mail after your visit with us. Thank you!             Your Updated Medication List - Protect others around you: Learn how to safely use, store and throw away your medicines at www.disposemymeds.org.          This list is accurate as of 9/5/18  5:11 PM.  Always use your most recent med list.                   Brand Name Dispense Instructions for use Diagnosis    EPINEPHrine 0.3 MG/0.3ML injection 2-pack    EPIPEN/ADRENACLICK/or ANY BX GENERIC EQUIV    0.6 mL    Inject 0.3 mLs (0.3 mg) into the muscle as needed for anaphylaxis    Anaphylactic reaction to bee sting, accidental or unintentional, subsequent encounter       ORDER FOR ALLERGEN IMMUNOTHERAPY 5 mL vial      12 mL    Wasp Venom 100 mcg/mL HS 12 ml Diluent: HSA qs to 5ml    Anaphylaxis due to hymenoptera venom, accidental or unintentional, subsequent encounter       ORDER FOR ALLERGEN IMMUNOTHERAPY 5 mL vial     12 mL    Mixed Vespid Venom 300 mcg/mL HS 12 ml Diluent: HSA qs to 5ml    Anaphylaxis due to hymenoptera venom, accidental or unintentional, subsequent encounter

## 2018-09-10 ENCOUNTER — ALLIED HEALTH/NURSE VISIT (OUTPATIENT)
Dept: ALLERGY | Facility: CLINIC | Age: 58
End: 2018-09-10
Payer: COMMERCIAL

## 2018-09-10 DIAGNOSIS — T63.441D TOXIC EFFECT OF VENOM OF BEES, UNINTENTIONAL, SUBSEQUENT ENCOUNTER: Primary | ICD-10-CM

## 2018-09-10 PROCEDURE — 95117 IMMUNOTHERAPY INJECTIONS: CPT

## 2018-09-10 PROCEDURE — 99207 ZZC DROP WITH A PROCEDURE: CPT

## 2018-09-10 NOTE — MR AVS SNAPSHOT
After Visit Summary   9/10/2018    Kaleigh Tyson    MRN: [id number]           Patient Information     Date Of Birth          1960        Visit Information        Provider Department      9/10/2018 9:00 AM AN ALLERGY SHOTS [facility]        Today's Diagnoses     Toxic effect of venom of bees, unintentional, subsequent encounter    -  1       Follow-ups after your visit        Your next 10 appointments already scheduled     Sep 17, 2018  1:30 PM CDT   Nurse Only with AN ALLERGY SHOTS   [facility] [place] ([facility])    [address] [facility] [place] [facility] [address] [phone]            Sep 24, 2018  1:30 PM CDT   Nurse Only with AN ALLERGY SHOTS   [facility] ([facility]    [address] [place] [address] [phone]              Who to contact     If you have questions or need follow up information about today's clinic visit or your schedule please contact [facility] directly at [phone].  Normal or non-critical lab and imaging results will be communicated to you by MyChart, letter or phone within 4 business days after the clinic has received the results. If you do not hear from us within 7 days, please contact the clinic through MyChart or phone. If you have a critical or abnormal lab result, we will notify you by phone as soon as possible.  Submit refill requests through Endorse or call your pharmacy and they will forward the refill request to us. Please allow 3 business days for your refill to be completed.          Additional Information About Your Visit        Care EveryWhere ID     This is your Care EveryWhere ID. This could be used by other organizations to access your [place] medical records  [id number]         Blood Pressure from Last 3 Encounters:   08/07/18 130/80   07/23/18 140/80   07/18/18 170/80    Weight from Last 3 Encounters:   08/07/18 65.8 kg (145 lb)   07/23/18 65.3 kg (144 lb)    07/18/18 64.4 kg (142 lb)              We Performed the Following     Allergy Shot: Two or more injections        Primary Care Provider Office Phone # Fax #    Lauren Saez [phone] [address] [facility] [address]        Equal Access to Services     [facility] MIRNA : Hadii aad ku hadasho Soomaali, waaxda luqadaha, qaybta kaalmada adeegyada, waxay idiin hayaan adeeg khyoselinsh la'aan . So [facility] [phone].    ATENCIÓN: Si habla español, tiene a pelaez disposición servicios gratuitos de asistencia lingüística. [facility] [phone].    We comply with applicable federal civil rights laws and [place] laws. We do not discriminate on the basis of race, color, national origin, age, disability, sex, sexual orientation, or gender identity.            Thank you!     Thank you for choosing [facility]  for your care. Our goal is always to provide you with excellent care. Hearing back from our patients is one way we can continue to improve our services. Please take a few minutes to complete the written survey that you may receive in the mail after your visit with us. Thank you!             Your Updated Medication List - Protect others around you: Learn how to safely use, store and throw away your medicines at www.disposemymeds.org.          This list is accurate as of 9/10/18  9:43 AM.  Always use your most recent med list.                   Brand Name Dispense Instructions for use Diagnosis    EPINEPHrine 0.3 MG/0.3ML injection 2-pack    EPIPEN/ADRENACLICK/or ANY BX GENERIC EQUIV    0.6 mL    Inject 0.3 mLs (0.3 mg) into the muscle as needed for anaphylaxis    Anaphylactic reaction to bee sting, accidental or unintentional, subsequent encounter       ORDER FOR ALLERGEN IMMUNOTHERAPY 5 mL vial     12 mL    Wasp Venom 100 mcg/mL HS 12 ml Diluent: HSA qs to 5ml    Anaphylaxis due to hymenoptera venom, accidental or unintentional, subsequent encounter       ORDER FOR ALLERGEN  IMMUNOTHERAPY 5 mL vial     12 mL    Mixed Vespid Venom 300 mcg/mL HS 12 ml Diluent: HSA qs to 5ml    Anaphylaxis due to hymenoptera venom, accidental or unintentional, subsequent encounter

## 2018-09-17 ENCOUNTER — ALLIED HEALTH/NURSE VISIT (OUTPATIENT)
Dept: ALLERGY | Facility: CLINIC | Age: 58
End: 2018-09-17
Payer: COMMERCIAL

## 2018-09-17 DIAGNOSIS — T63.441D TOXIC EFFECT OF VENOM OF BEES, UNINTENTIONAL, SUBSEQUENT ENCOUNTER: Primary | ICD-10-CM

## 2018-09-17 PROCEDURE — 95117 IMMUNOTHERAPY INJECTIONS: CPT

## 2018-09-17 PROCEDURE — 99207 ZZC DROP WITH A PROCEDURE: CPT

## 2018-09-17 NOTE — MR AVS SNAPSHOT
After Visit Summary   9/17/2018    Kaleigh Tyson    MRN: [id number]           Patient Information     Date Of Birth          1960        Visit Information        Provider Department      9/17/2018 1:30 PM AN ALLERGY SHOTS [facility] [place]        Today's Diagnoses     Toxic effect of venom of bees, unintentional, subsequent encounter    -  1       Follow-ups after your visit        Your next 10 appointments already scheduled     Sep 24, 2018  1:30 PM CDT   Nurse Only with AN ALLERGY SHOTS   [facility] [place] ([facility] [place])    [address]  [place] [address]   [phone]            Oct 01, 2018  3:00 PM CDT   Nurse Only with AN ALLERGY SHOTS   [facility] [place] ([facility] [place])    [address]  [place] [address]   [phone]            Oct 08, 2018  3:00 PM CDT   Nurse Only with AN ALLERGY SHOTS   [facility] [place] ([facility] [place])    [address]  [place] [address]   [phone]            Oct 15, 2018  3:00 PM CDT   Nurse Only with AN ALLERGY SHOTS   [facility] [place] ([facility] [place])    [address]  [place] [address]   [phone]            Oct 22, 2018  3:00 PM CDT   Nurse Only with AN ALLERGY SHOTS   [facility] [place] ([facility] [place])    [address]  [place] [address]   [phone]            Oct 29, 2018  3:00 PM CDT   Nurse Only with AN ALLERGY SHOTS   [facility] [place] ([facility] [place])    [address]  [place] [address]   [phone]              Who to contact     If you have questions or need follow up information about today's clinic visit or your schedule please contact [facility] directly at [phone].  Normal or non-critical lab and imaging results will be communicated to you by MyChart, letter or phone within 4 business days after the clinic has received the results. If you do  not hear from us within 7 days, please contact the clinic through PolarLaket or phone. If you have a critical or abnormal lab result, we will notify you by phone as soon as possible.  Submit refill requests through Be[employer] or call your pharmacy and they will forward the refill request to us. Please allow 3 business days for your refill to be completed.          Additional Information About Your Visit        Care EveryWhere ID     This is your Care EveryWhere ID. This could be used by other organizations to access your [place] medical records  [id number]         Blood Pressure from Last 3 Encounters:   08/07/18 130/80   07/23/18 140/80   07/18/18 170/80    Weight from Last 3 Encounters:   08/07/18 65.8 kg (145 lb)   07/23/18 65.3 kg (144 lb)   07/18/18 64.4 kg (142 lb)              We Performed the Following     Allergy Shot: Two or more injections        Primary Care Provider Office Phone # Fax #    Lauren Taina Saez [phone] [address] [facility] [address]        Equal Access to Services     [facility]: Hadii aad ku hadasho Soomaali, waaxda luqadaha, qaybta kaalmada adeegyaherman, gloria tomlinson . So [facility] [phone].    ATENCIÓN: Si habla español, tiene a pelaez disposición servicios gratuitos de asistencia lingüística. [facility] [phone].    We comply with applicable federal civil rights laws and [place] laws. We do not discriminate on the basis of race, color, national origin, age, disability, sex, sexual orientation, or gender identity.            Thank you!     Thank you for choosing [facility]  for your care. Our goal is always to provide you with excellent care. Hearing back from our patients is one way we can continue to improve our services. Please take a few minutes to complete the written survey that you may receive in the mail after your visit with us. Thank you!             Your Updated Medication List - Protect others around  you: Learn how to safely use, store and throw away your medicines at www.disposemymeds.org.          This list is accurate as of 9/17/18  2:06 PM.  Always use your most recent med list.                   Brand Name Dispense Instructions for use Diagnosis    EPINEPHrine 0.3 MG/0.3ML injection 2-pack    EPIPEN/ADRENACLICK/or ANY BX GENERIC EQUIV    0.6 mL    Inject 0.3 mLs (0.3 mg) into the muscle as needed for anaphylaxis    Anaphylactic reaction to bee sting, accidental or unintentional, subsequent encounter       ORDER FOR ALLERGEN IMMUNOTHERAPY 5 mL vial     12 mL    Wasp Venom 100 mcg/mL HS 12 ml Diluent: HSA qs to 5ml    Anaphylaxis due to hymenoptera venom, accidental or unintentional, subsequent encounter       ORDER FOR ALLERGEN IMMUNOTHERAPY 5 mL vial     12 mL    Mixed Vespid Venom 300 mcg/mL HS 12 ml Diluent: HSA qs to 5ml    Anaphylaxis due to hymenoptera venom, accidental or unintentional, subsequent encounter

## 2018-09-24 ENCOUNTER — ALLIED HEALTH/NURSE VISIT (OUTPATIENT)
Dept: ALLERGY | Facility: CLINIC | Age: 58
End: 2018-09-24
Payer: COMMERCIAL

## 2018-09-24 DIAGNOSIS — T63.441D TOXIC EFFECT OF VENOM OF BEES, UNINTENTIONAL, SUBSEQUENT ENCOUNTER: Primary | ICD-10-CM

## 2018-09-24 PROCEDURE — 95117 IMMUNOTHERAPY INJECTIONS: CPT

## 2018-09-24 PROCEDURE — 99207 ZZC DROP WITH A PROCEDURE: CPT

## 2018-09-24 NOTE — MR AVS SNAPSHOT
After Visit Summary   9/24/2018    Kaleigh Tyson    MRN: [id number]           Patient Information     Date Of Birth          1960        Visit Information        Provider Department      9/24/2018 1:30 PM AN ALLERGY SHOTS [facility] [place]        Today's Diagnoses     Toxic effect of venom of bees, unintentional, subsequent encounter    -  1       Follow-ups after your visit        Your next 10 appointments already scheduled     Oct 01, 2018  3:00 PM CDT   Nurse Only with AN ALLERGY SHOTS   [facility] [place] ([facility] [place])    [address]  [place] [address]   [phone]            Oct 08, 2018  3:00 PM CDT   Nurse Only with AN ALLERGY SHOTS   [facility] [place] ([facility] [place])    [address]  [place] [address] [phone]            Oct 15, 2018  3:00 PM CDT   Nurse Only with AN ALLERGY SHOTS   [facility] [place] ([facility] [place])    [address]  [place] [address] [phone]            Oct 22, 2018  3:00 PM CDT   Nurse Only with AN ALLERGY SHOTS   [facility] [place] ([facility] [place])    [address]  [place] [address] [phone]            Oct 29, 2018  3:00 PM CDT   Nurse Only with AN ALLERGY SHOTS   [facility] [place] ([facility] [place])    [address]  [place] [address] [phone]              Who to contact     If you have questions or need follow up information about today's clinic visit or your schedule please contact [facility] directly at [phone].  Normal or non-critical lab and imaging results will be communicated to you by MyChart, letter or phone within 4 business days after the clinic has received the results. If you do not hear from us within 7 days, please contact the clinic through MyChart or phone. If you have a critical or abnormal lab result, we will notify you by phone as soon as possible.  Submit  refill requests through FamilyLeaf or call your pharmacy and they will forward the refill request to us. Please allow 3 business days for your refill to be completed.          Additional Information About Your Visit        Care EveryWhere ID     This is your Care EveryWhere ID. This could be used by other organizations to access your [place] medical records  [id number]         Blood Pressure from Last 3 Encounters:   08/07/18 130/80   07/23/18 140/80   07/18/18 170/80    Weight from Last 3 Encounters:   08/07/18 65.8 kg (145 lb)   07/23/18 65.3 kg (144 lb)   07/18/18 64.4 kg (142 lb)              We Performed the Following     Allergy Shot: Two or more injections        Primary Care Provider Office Phone # Fax #    Lauren Taina Saez [phone] [address] [facility] [address]        Equal Access to Services     [facility]: Hadii ruth ku hadasho Soomaali, waaxda luqadaha, qaybta kaalmada adeegyada, waxlisbeth tapia hayanson tomlinson . So [facility] [phone].    ATENCIÓN: Si habla español, tiene a pelaez disposición servicios gratuitos de asistencia lingüística. [facility] [phone].    We comply with applicable federal civil rights laws and [place] laws. We do not discriminate on the basis of race, color, national origin, age, disability, sex, sexual orientation, or gender identity.            Thank you!     Thank you for choosing [facility]  for your care. Our goal is always to provide you with excellent care. Hearing back from our patients is one way we can continue to improve our services. Please take a few minutes to complete the written survey that you may receive in the mail after your visit with us. Thank you!             Your Updated Medication List - Protect others around you: Learn how to safely use, store and throw away your medicines at www.disposemymeds.org.          This list is accurate as of 9/24/18  2:05 PM.  Always use your most recent med list.                    Brand Name Dispense Instructions for use Diagnosis    EPINEPHrine 0.3 MG/0.3ML injection 2-pack    EPIPEN/ADRENACLICK/or ANY BX GENERIC EQUIV    0.6 mL    Inject 0.3 mLs (0.3 mg) into the muscle as needed for anaphylaxis    Anaphylactic reaction to bee sting, accidental or unintentional, subsequent encounter       ORDER FOR ALLERGEN IMMUNOTHERAPY 5 mL vial     12 mL    Wasp Venom 100 mcg/mL HS 12 ml Diluent: HSA qs to 5ml    Anaphylaxis due to hymenoptera venom, accidental or unintentional, subsequent encounter       ORDER FOR ALLERGEN IMMUNOTHERAPY 5 mL vial     12 mL    Mixed Vespid Venom 300 mcg/mL HS 12 ml Diluent: HSA qs to 5ml    Anaphylaxis due to hymenoptera venom, accidental or unintentional, subsequent encounter

## 2018-10-01 ENCOUNTER — ALLIED HEALTH/NURSE VISIT (OUTPATIENT)
Dept: ALLERGY | Facility: CLINIC | Age: 58
End: 2018-10-01
Payer: COMMERCIAL

## 2018-10-01 DIAGNOSIS — T63.441D TOXIC EFFECT OF VENOM OF BEES, UNINTENTIONAL, SUBSEQUENT ENCOUNTER: Primary | ICD-10-CM

## 2018-10-01 PROCEDURE — 95117 IMMUNOTHERAPY INJECTIONS: CPT

## 2018-10-01 PROCEDURE — 99207 ZZC DROP WITH A PROCEDURE: CPT

## 2018-10-01 NOTE — MR AVS SNAPSHOT
After Visit Summary   10/1/2018    Kaleigh Tyson    MRN: [id number]           Patient Information     Date Of Birth          1960        Visit Information        Provider Department      10/1/2018 2:30 PM AN ALLERGY SHOTS [facility] [place]        Today's Diagnoses     Toxic effect of venom of bees, unintentional, subsequent encounter    -  1       Follow-ups after your visit        Your next 10 appointments already scheduled     Oct 08, 2018  3:00 PM CDT   Nurse Only with AN ALLERGY SHOTS   [facility] [place] ([facility] [place])    [address]  [place] [address]   [phone]            Oct 15, 2018  3:00 PM CDT   Nurse Only with AN ALLERGY SHOTS   [facility] [place] ([facility] [place])    [address]  [place] [address]   [phone]            Oct 22, 2018  3:00 PM CDT   Nurse Only with AN ALLERGY SHOTS   [facility] [place] ([facility] [place])    [address]  [place] [address] [phone]            Oct 29, 2018  3:00 PM CDT   Nurse Only with AN ALLERGY SHOTS   [facility] [place] ([facility] [place])    [address]  [place] [address] [phone]              Who to contact     If you have questions or need follow up information about today's clinic visit or your schedule please contact [facility] directly at [phone].  Normal or non-critical lab and imaging results will be communicated to you by MyChart, letter or phone within 4 business days after the clinic has received the results. If you do not hear from us within 7 days, please contact the clinic through MyChart or phone. If you have a critical or abnormal lab result, we will notify you by phone as soon as possible.  Submit refill requests through Zazoo or call your pharmacy and they will forward the refill request to us. Please allow 3 business days for your refill to be completed.          Additional  Information About Your Visit        Care EveryWhere ID     This is your Care EveryWhere ID. This could be used by other organizations to access your [place] medical records  [id number]         Blood Pressure from Last 3 Encounters:   08/07/18 130/80   07/23/18 140/80   07/18/18 170/80    Weight from Last 3 Encounters:   08/07/18 65.8 kg (145 lb)   07/23/18 65.3 kg (144 lb)   07/18/18 64.4 kg (142 lb)              We Performed the Following     Allergy Shot: Two or more injections        Primary Care Provider Office Phone # Fax #    Lauren Taina Saez [phone] [address] [facility] [address]        Equal Access to Services     [facility] : Hadii ruth stone hadasho Somaricel, waaxda luqadaha, qaybta kaalmada adeegyada, waxlisbeth tomlinson . So [facility] [phone].    ATENCIÓN: Si habla español, tiene a pelaez disposición servicios gratuitos de asistencia lingüística. [facility] [phone].    We comply with applicable federal civil rights laws and [place] laws. We do not discriminate on the basis of race, color, national origin, age, disability, sex, sexual orientation, or gender identity.            Thank you!     Thank you for choosing [facility] [place]  for your care. Our goal is always to provide you with excellent care. Hearing back from our patients is one way we can continue to improve our services. Please take a few minutes to complete the written survey that you may receive in the mail after your visit with us. Thank you!             Your Updated Medication List - Protect others around you: Learn how to safely use, store and throw away your medicines at www.disposemymeds.org.          This list is accurate as of 10/1/18  3:13 PM.  Always use your most recent med list.                   Brand Name Dispense Instructions for use Diagnosis    EPINEPHrine 0.3 MG/0.3ML injection 2-pack    EPIPEN/ADRENACLICK/or ANY BX GENERIC EQUIV    0.6 mL    Inject 0.3  mLs (0.3 mg) into the muscle as needed for anaphylaxis    Anaphylactic reaction to bee sting, accidental or unintentional, subsequent encounter       ORDER FOR ALLERGEN IMMUNOTHERAPY 5 mL vial     12 mL    Wasp Venom 100 mcg/mL HS 12 ml Diluent: HSA qs to 5ml    Anaphylaxis due to hymenoptera venom, accidental or unintentional, subsequent encounter       ORDER FOR ALLERGEN IMMUNOTHERAPY 5 mL vial     12 mL    Mixed Vespid Venom 300 mcg/mL HS 12 ml Diluent: HSA qs to 5ml    Anaphylaxis due to hymenoptera venom, accidental or unintentional, subsequent encounter

## 2018-10-08 ENCOUNTER — ALLIED HEALTH/NURSE VISIT (OUTPATIENT)
Dept: ALLERGY | Facility: CLINIC | Age: 58
End: 2018-10-08
Payer: COMMERCIAL

## 2018-10-08 DIAGNOSIS — T63.441D TOXIC EFFECT OF VENOM OF BEES, UNINTENTIONAL, SUBSEQUENT ENCOUNTER: Primary | ICD-10-CM

## 2018-10-08 PROCEDURE — 95117 IMMUNOTHERAPY INJECTIONS: CPT

## 2018-10-08 PROCEDURE — 99207 ZZC DROP WITH A PROCEDURE: CPT

## 2018-10-08 NOTE — MR AVS SNAPSHOT
After Visit Summary   10/8/2018    Kaleigh Tyson    MRN: [id number]           Patient Information     Date Of Birth          1960        Visit Information        Provider Department      10/8/2018 3:00 PM AN ALLERGY SHOTS [facility] [place]        Today's Diagnoses     Toxic effect of venom of bees, unintentional, subsequent encounter    -  1       Follow-ups after your visit        Your next 10 appointments already scheduled     Oct 15, 2018  3:00 PM CDT   Nurse Only with AN ALLERGY SHOTS   [facility] [place] ([facility] [place])    [address]  [place] [address] [phone]            Oct 22, 2018  3:00 PM CDT   Nurse Only with AN ALLERGY SHOTS   [facility] [place] ([facility] [place])    [address]  [place] [address] [phone]            Oct 29, 2018  3:00 PM CDT   Nurse Only with AN ALLERGY SHOTS   [facility] [place] ([facility] [place])    [address]  [place] [address] [phone]              Who to contact     If you have questions or need follow up information about today's clinic visit or your schedule please contact [facility] [place] [facility] directly at [phone].  Normal or non-critical lab and imaging results will be communicated to you by MyChart, letter or phone within 4 business days after the clinic has received the results. If you do not hear from us within 7 days, please contact the clinic through MyChart or phone. If you have a critical or abnormal lab result, we will notify you by phone as soon as possible.  Submit refill requests through [employer] or call your pharmacy and they will forward the refill request to us. Please allow 3 business days for your refill to be completed.          Additional Information About Your Visit        Care EveryWhere ID     This is your Care EveryWhere ID. This could be used by other organizations to access your [place] medical records  [id number]          Blood Pressure from Last 3 Encounters:   08/07/18 130/80   07/23/18 140/80   07/18/18 170/80    Weight from Last 3 Encounters:   08/07/18 65.8 kg (145 lb)   07/23/18 65.3 kg (144 lb)   07/18/18 64.4 kg (142 lb)              We Performed the Following     Allergy Shot: Two or more injections        Primary Care Provider Office Phone # Fax #    Lauren Taina Saez [phone] [address] [facility] [address]        Equal Access to Services     [facility]: Hadii aad ku hadasho Soomaali, waaxda luqadaha, qaybta kaalmada adeegyada, waxlisbeth tapia hayanson tomlinson . So [facility] [phone].    ATENCIÓN: Si habla español, tiene a pelaez disposición servicios gratuitos de asistencia lingüística. [facility] [phone].    We comply with applicable federal civil rights laws and [place] laws. We do not discriminate on the basis of race, color, national origin, age, disability, sex, sexual orientation, or gender identity.            Thank you!     Thank you for choosing [facility]  for your care. Our goal is always to provide you with excellent care. Hearing back from our patients is one way we can continue to improve our services. Please take a few minutes to complete the written survey that you may receive in the mail after your visit with us. Thank you!             Your Updated Medication List - Protect others around you: Learn how to safely use, store and throw away your medicines at www.disposemymeds.org.          This list is accurate as of 10/8/18  3:40 PM.  Always use your most recent med list.                   Brand Name Dispense Instructions for use Diagnosis    EPINEPHrine 0.3 MG/0.3ML injection 2-pack    EPIPEN/ADRENACLICK/or ANY BX GENERIC EQUIV    0.6 mL    Inject 0.3 mLs (0.3 mg) into the muscle as needed for anaphylaxis    Anaphylactic reaction to bee sting, accidental or unintentional, subsequent encounter       ORDER FOR ALLERGEN IMMUNOTHERAPY 5 mL vial      12 mL    Wasp Venom 100 mcg/mL HS 12 ml Diluent: HSA qs to 5ml    Anaphylaxis due to hymenoptera venom, accidental or unintentional, subsequent encounter       ORDER FOR ALLERGEN IMMUNOTHERAPY 5 mL vial     12 mL    Mixed Vespid Venom 300 mcg/mL HS 12 ml Diluent: HSA qs to 5ml    Anaphylaxis due to hymenoptera venom, accidental or unintentional, subsequent encounter

## 2018-10-15 ENCOUNTER — ALLIED HEALTH/NURSE VISIT (OUTPATIENT)
Dept: ALLERGY | Facility: CLINIC | Age: 58
End: 2018-10-15
Payer: COMMERCIAL

## 2018-10-15 DIAGNOSIS — T63.441D TOXIC EFFECT OF VENOM OF BEES, UNINTENTIONAL, SUBSEQUENT ENCOUNTER: Primary | ICD-10-CM

## 2018-10-15 PROCEDURE — 99207 ZZC DROP WITH A PROCEDURE: CPT

## 2018-10-15 PROCEDURE — 95117 IMMUNOTHERAPY INJECTIONS: CPT

## 2018-10-15 NOTE — MR AVS SNAPSHOT
After Visit Summary   10/15/2018    Kaleigh Tyson    MRN: [id number]           Patient Information     Date Of Birth          1960        Visit Information        Provider Department      10/15/2018 2:10 PM AN ALLERGY SHOTS [facility]        Today's Diagnoses     Toxic effect of venom of bees, unintentional, subsequent encounter    -  1       Follow-ups after your visit        Your next 10 appointments already scheduled     Oct 22, 2018  3:00 PM CDT   Nurse Only with AN ALLERGY SHOTS   [facility] ([facility])    [address] [address] [phone]            Oct 29, 2018  3:00 PM CDT   Nurse Only with AN ALLERGY SHOTS   [facility] ([facility])    [address] [facility] [address] [phone]              Who to contact     If you have questions or need follow up information about today's clinic visit or your schedule please contact [facility] directly at [phone].  Normal or non-critical lab and imaging results will be communicated to you by MyChart, letter or phone within 4 business days after the clinic has received the results. If you do not hear from us within 7 days, please contact the clinic through MyChart or phone. If you have a critical or abnormal lab result, we will notify you by phone as soon as possible.  Submit refill requests through [email or web address] or call your pharmacy and they will forward the refill request to us. Please allow 3 business days for your refill to be completed.          Additional Information About Your Visit        Care EveryWhere ID     This is your Care EveryWhere ID. This could be used by other organizations to access your [place] medical records  [id number]         Blood Pressure from Last 3 Encounters:   08/07/18 130/80   07/23/18 140/80   07/18/18 170/80    Weight from Last 3 Encounters:   08/07/18 65.8 kg (145 lb)   07/23/18 65.3 kg (144  lb)   07/18/18 64.4 kg (142 lb)              We Performed the Following     Allergy Shot: Two or more injections        Primary Care Provider Office Phone # Fax #    Lauren Saez [phone] [address] [facility] [facility] [address] [facility] [address]        Equal Access to Services     [facility] : Hadii aad ku hadasho Soomaali, waaxda luqadaha, qaybta kaalmada adeegyada, waxay idiin hayaan adeeg agnes laissan . So [facility] [place] [phone].    ATENCIÓN: Si habla español, tiene a pelaez disposición servicios gratuitos de asistencia lingüística. [facility] [phone].    We comply with applicable federal civil rights laws and [place] laws. We do not discriminate on the basis of race, color, national origin, age, disability, sex, sexual orientation, or gender identity.            Thank you!     Thank you for choosing [facility]  for your care. Our goal is always to provide you with excellent care. Hearing back from our patients is one way we can continue to improve our services. Please take a few minutes to complete the written survey that you may receive in the mail after your visit with us. Thank you!             Your Updated Medication List - Protect others around you: Learn how to safely use, store and throw away your medicines at www.disposemymeds.org.          This list is accurate as of 10/15/18  2:33 PM.  Always use your most recent med list.                   Brand Name Dispense Instructions for use Diagnosis    EPINEPHrine 0.3 MG/0.3ML injection 2-pack    EPIPEN/ADRENACLICK/or ANY BX GENERIC EQUIV    0.6 mL    Inject 0.3 mLs (0.3 mg) into the muscle as needed for anaphylaxis    Anaphylactic reaction to bee sting, accidental or unintentional, subsequent encounter       ORDER FOR ALLERGEN IMMUNOTHERAPY 5 mL vial     12 mL    Wasp Venom 100 mcg/mL HS 12 ml Diluent: HSA qs to 5ml    Anaphylaxis due to hymenoptera venom, accidental or unintentional, subsequent encounter       ORDER FOR ALLERGEN  IMMUNOTHERAPY 5 mL vial     12 mL    Mixed Vespid Venom 300 mcg/mL HS 12 ml Diluent: HSA qs to 5ml    Anaphylaxis due to hymenoptera venom, accidental or unintentional, subsequent encounter

## 2018-10-22 ENCOUNTER — TELEPHONE (OUTPATIENT)
Dept: ALLERGY | Facility: CLINIC | Age: 58
End: 2018-10-22

## 2018-10-22 NOTE — TELEPHONE ENCOUNTER
L/M for pt to call [phone] to reschedule today's appointment for after 5 p.m or to reschedule another day due to no RN coverage at current scheduled time.    Bharti Velez CMA ....... 10/22/2018 9:21 AM

## 2018-10-24 ENCOUNTER — ALLIED HEALTH/NURSE VISIT (OUTPATIENT)
Dept: ALLERGY | Facility: CLINIC | Age: 58
End: 2018-10-24
Payer: COMMERCIAL

## 2018-10-24 DIAGNOSIS — T63.441D TOXIC EFFECT OF VENOM OF BEES, UNINTENTIONAL, SUBSEQUENT ENCOUNTER: Primary | ICD-10-CM

## 2018-10-24 PROCEDURE — 95117 IMMUNOTHERAPY INJECTIONS: CPT

## 2018-10-24 NOTE — MR AVS SNAPSHOT
After Visit Summary   10/24/2018    Kaleigh Tyson    MRN: [id number]           Patient Information     Date Of Birth          1960        Visit Information        Provider Department      10/24/2018 4:50 PM AN ALLERGY SHOTS [facility]        Today's Diagnoses     Toxic effect of venom of bees, unintentional, subsequent encounter    -  1      Care Instructions    Anaphylaxis Action Plan for Immunotherapy Patients    There is risk of systemic reactions when receiving immunotherapy injections. Local reactions such as a wheal (hive) smaller than a quarter, redness, swelling, and soreness are common. If the wheal is greater than the size of a half dollar (3.4 cm) please contact our clinic (numbers below), as we will need to adjust the dose that you receive at your next injection. Waiting until the next appointment to inform us of the reaction could increase the wait time that you experience, because your allergist will need to be contacted for new orders prior to giving the injection.  If you have symptoms not localized to the injection site, please follow the anaphylaxis plan, and contact our office to update after you have received emergency medical treatment. Please ask to speak to an Allergy RN with any questions or updates.     [facility]: [phone]  [facility]: [phone]  [facility]: [phone]  [place]: [phone]  [phone]                Follow-ups after your visit        Your next 10 appointments already scheduled     Oct 24, 2018  4:50 PM CDT   Nurse Only with AN ALLERGY SHOTS   [facility] ([facility]    [address] [facility] [address]   [phone]            Oct 29, 2018  3:00 PM CDT   Nurse Only with AN ALLERGY SHOTS   [facility]    [address] [facility] [address]   [phone]            2018  3:00 PM CST   Nurse Only with AN ALLERGY  SHOTS   [facility] [place] ([facility] [place]    [address]  [place] [address]   [phone]            Nov 12, 2018  3:00 PM CST   Nurse Only with AN ALLERGY SHOTS   [facility] [place] ([facility] [place]    [address]  [place] [address]   [phone]            Nov 19, 2018  3:00 PM CST   Nurse Only with AN ALLERGY SHOTS   [facility] [place] ([facility] [place]    [address]  [place] [address]   [phone]            Nov 26, 2018  3:00 PM CST   Nurse Only with AN ALLERGY SHOTS   [facility] [place] ([facility] [place])    [address]  [place] [address] [phone]              Who to contact     If you have questions or need follow up information about today's clinic visit or your schedule please contact [facility] directly at [phone].  Normal or non-critical lab and imaging results will be communicated to you by MyChart, letter or phone within 4 business days after the clinic has received the results. If you do not hear from us within 7 days, please contact the clinic through MyChart or phone. If you have a critical or abnormal lab result, we will notify you by phone as soon as possible.  Submit refill requests through [employer] or call your pharmacy and they will forward the refill request to us. Please allow 3 business days for your refill to be completed.          Additional Information About Your Visit        Care EveryWhere ID     This is your Care EveryWhere ID. This could be used by other organizations to access your [place] medical records  [id number]         Blood Pressure from Last 3 Encounters:   08/07/18 130/80   07/23/18 140/80   07/18/18 170/80    Weight from Last 3 Encounters:   08/07/18 65.8 kg (145 lb)   07/23/18 65.3 kg (144 lb)   07/18/18 64.4 kg (142 lb)              We Performed the Following     Allergy Shot: Two or more injections        Primary Care Provider  Office Phone # Fax #    Lauren Taina Saez [phone] [address] [facility] [address]        Equal Access to Services     [facility] : Hadarun ruth stone tainao Sosofiali, waaxda luqadaha, qaybta kaalmada jori, gloria solo josygia alarcon laBipinanson jimenez. So [facility] [phone].    ATENCIÓN: Si habla español, tiene a pelaez disposición servicios gratuitos de asistencia lingüística. [facility] al [phone].    We comply with applicable federal civil rights laws and [place] laws. We do not discriminate on the basis of race, color, national origin, age, disability, sex, sexual orientation, or gender identity.            Thank you!     Thank you for choosing [facility]  for your care. Our goal is always to provide you with excellent care. Hearing back from our patients is one way we can continue to improve our services. Please take a few minutes to complete the written survey that you may receive in the mail after your visit with us. Thank you!             Your Updated Medication List - Protect others around you: Learn how to safely use, store and throw away your medicines at www.disposemymeds.org.          This list is accurate as of 10/24/18  4:47 PM.  Always use your most recent med list.                   Brand Name Dispense Instructions for use Diagnosis    EPINEPHrine 0.3 MG/0.3ML injection 2-pack    EPIPEN/ADRENACLICK/or ANY BX GENERIC EQUIV    0.6 mL    Inject 0.3 mLs (0.3 mg) into the muscle as needed for anaphylaxis    Anaphylactic reaction to bee sting, accidental or unintentional, subsequent encounter       ORDER FOR ALLERGEN IMMUNOTHERAPY 5 mL vial     12 mL    Wasp Venom 100 mcg/mL HS 12 ml Diluent: HSA qs to 5ml    Anaphylaxis due to hymenoptera venom, accidental or unintentional, subsequent encounter       ORDER FOR ALLERGEN IMMUNOTHERAPY 5 mL vial     12 mL    Mixed Vespid Venom 300 mcg/mL HS 12 ml Diluent: HSA qs to 5ml    Anaphylaxis due to hymenoptera venom,  accidental or unintentional, subsequent encounter

## 2018-10-24 NOTE — PROGRESS NOTES
Patient presented after waiting 30 minutes with no reaction to allergy injections. Discharged from clinic.    Hussain Young RN....10/24/2018 5:18 PM

## 2018-10-24 NOTE — PATIENT INSTRUCTIONS
Anaphylaxis Action Plan for Immunotherapy Patients    There is risk of systemic reactions when receiving immunotherapy injections. Local reactions such as a wheal (hive) smaller than a quarter, redness, swelling, and soreness are common. If the wheal is greater than the size of a half dollar (3.4 cm) please contact our clinic (numbers below), as we will need to adjust the dose that you receive at your next injection. Waiting until the next appointment to inform us of the reaction could increase the wait time that you experience, because your allergist will need to be contacted for new orders prior to giving the injection.  If you have symptoms not localized to the injection site, please follow the anaphylaxis plan, and contact our office to update after you have received emergency medical treatment. Please ask to speak to an Allergy RN with any questions or updates.     [facility]: [phone]  [facility]: [phone]  [facility]: [phone]  [place]: [phone]  [phone]

## 2018-10-25 ENCOUNTER — ALLIED HEALTH/NURSE VISIT (OUTPATIENT)
Dept: NURSING | Facility: CLINIC | Age: 58
End: 2018-10-25
Payer: COMMERCIAL

## 2018-10-25 ENCOUNTER — OFFICE VISIT (OUTPATIENT)
Dept: URGENT CARE | Facility: URGENT CARE | Age: 58
End: 2018-10-25
Payer: COMMERCIAL

## 2018-10-25 VITALS
OXYGEN SATURATION: 100 % | TEMPERATURE: 97.3 F | DIASTOLIC BLOOD PRESSURE: 80 MMHG | RESPIRATION RATE: 14 BRPM | HEART RATE: 69 BPM | SYSTOLIC BLOOD PRESSURE: 140 MMHG

## 2018-10-25 VITALS
OXYGEN SATURATION: 97 % | SYSTOLIC BLOOD PRESSURE: 161 MMHG | BODY MASS INDEX: 25.21 KG/M2 | TEMPERATURE: 97.3 F | HEART RATE: 66 BPM | DIASTOLIC BLOOD PRESSURE: 84 MMHG | WEIGHT: 148 LBS

## 2018-10-25 DIAGNOSIS — R21 RASH: Primary | ICD-10-CM

## 2018-10-25 DIAGNOSIS — F41.9 ANXIETY: ICD-10-CM

## 2018-10-25 DIAGNOSIS — L29.9 ITCHY SKIN: Primary | ICD-10-CM

## 2018-10-25 DIAGNOSIS — R03.0 ELEVATED BLOOD PRESSURE READING WITHOUT DIAGNOSIS OF HYPERTENSION: ICD-10-CM

## 2018-10-25 DIAGNOSIS — Z91.030 ALLERGY TO BEE STING: ICD-10-CM

## 2018-10-25 PROCEDURE — 99214 OFFICE O/P EST MOD 30 MIN: CPT | Performed by: FAMILY MEDICINE

## 2018-10-25 ASSESSMENT — PAIN SCALES - GENERAL: PAINLEVEL: NO PAIN (0)

## 2018-10-25 NOTE — LETTER
[facility]  [address] [facility] [address] [address]  Phone: [phone]    October 25, 2018        Kaleigh Tyson  [address] [facility] [address]          To whom it may concern:    RE: Kaleigh Tyson    Patient was seen and treated today at our clinic and missed work.  Please excuse from missed work.     Please contact me for questions or concerns.      Sincerely,        Gisela Shipman MD

## 2018-10-25 NOTE — NURSING NOTE
"Chief Complaint   Patient presents with     Allergic Reaction     Left arm reaction to allergy shots yesterday. Some itchiness still,         Initial /88  Pulse 69  Temp 97.3  F (36.3  C) (Oral)  Resp 14  SpO2 100% Estimated body mass index is 25.21 kg/(m^2) as calculated from the following:    Height as of 3/19/18: 5' 4.25\" (1.632 m).    Weight as of an earlier encounter on 10/25/18: 148 lb (67.1 kg)..  BP completed using cuff size: naga Brown CMA    "

## 2018-10-25 NOTE — PROGRESS NOTES
Chief complaint: concern about allergic reaction    Had bee allergy shots yesterday  Was at work 6 am   At about 2pm started having itching on the left arm  Patient rubbed it and started getting worse  Took a dose of zyrtec   Went to the nurse blood pressure was high patient was very anxious  Systolic of 200's. They rechecked and went down to 160's.   Because of this was advised to go to urgent care or ER.  Patient opted here instead    Accompanied by     No thoughts of harming self or others. Very anxious  She feels safe at home    No lip swelling no tongue swelling no wheezing    BP elevated today but asymptomatic. No headache no blurring of vision no chest pain no shortness of breath no dizziness no unsteadiness no numbness no weakness    Problem list and histories reviewed & adjusted, as indicated.  Additional history: as documented    Problem list, Medication list, Allergies, and Medical/Social/Surgical histories reviewed in [facility] and updated as appropriate.    ROS:  Constitutional, HEENT, cardiovascular, pulmonary, gi and gu systems are negative, except as otherwise noted.    OBJECTIVE:                                                    /80  Pulse 69  Temp 97.3  F (36.3  C) (Oral)  Resp 14  SpO2 100%  There is no height or weight on file to calculate BMI.  GENERAL: healthy, alert and no distress  EYES: Eyes grossly normal to inspection, PERRL and conjunctivae and sclerae normal  HENT: ear canals and TM's normal, nose and mouth without ulcers or lesions  NECK: no adenopathy, no asymmetry, masses, or scars and thyroid normal to palpation  RESP: lungs clear to auscultation - no rales, rhonchi or wheezes  CV: regular rate and rhythm, normal S1 S2, no S3 or S4, no murmur, click or rub, no peripheral edema and peripheral pulses strong  MS: no gross musculoskeletal defects noted, no edema  SKIN: left upper arm punctum from allergy injection noted  Some very minimal erythema around this about 5cm  diameter. Hardly discernable. Minimal warmth.   She has been scratching this. Although zyrtec seemed to improve   NEURO: Normal strength and tone, mentation intact and speech normal  PSYCH: mentation appears normal, affect normal/bright    Diagnostic Test Results:  none      ASSESSMENT/PLAN:                                                        ICD-10-CM    1. Itchy skin L29.9    2. Allergy to bee sting Z91.030    3. Elevated blood pressure reading without diagnosis of hypertension R03.0    4. Anxiety F41.9      Possible local reaction to allergy shot - very minimal at this time.   I recommend that she contact the allergy triage and she states she will. She has taken zyrtec and it seems to be helping   Alarm signs or symptoms discussed, if present recommend go to ER   Offered to ask opinion of allergy on call she declines, she will contact their RN if she has concerns. Again symptoms are very minimal at this time.  She has an epipen at home. Signs or symptoms of anaphylaxis reviewed  Adverse reactions of medications discussed.  Over the counter medications discussed.   Aware to come back in if with worsening symptoms or if no relief despite treatment plan  Patient voiced understanding and had no further questions.     Signs or symptoms of infection or cellulitis discussed if concerns come in asap. Given clinical presentation and timing, unlikely at this time. Will observe. Patient voiced understanding.     BP rechecked here as was quite elevated initially but on recheck improved  Anxiety is playing a role she feels. She will monitor and try to check at home  No thoughts of harming self or others. Feels safe  Your blood pressure reading was elevated today.  Recommend that you have it re-checked either at home or at our clinic within a week  Avoid cold medicines that have pseudoephedrin in it, or Sudafed. You may take regular or plain Robitussin or Mucinex  If you are unsure, please ask the pharmacist    If your  blood pressure top number (systolic) 180 and above, or the bottom number (diastolic) 120 and above, you need to be seen immediately.  If persistently elevated top number 140 and above, or the bottom number 90 and above, please schedule an appointment to see a provider in clinic within a week.  If you have very elevated blood pressures, accompanied by headache, chest pain, numbness, weakness, slurring of speech, confusion, difficulty walking, call 911 and go to the ER  Follow up with primary care provider for anxiety    MD Gisela Lan MD  [facility]

## 2018-10-25 NOTE — PROGRESS NOTES
Patient reports that she had allegy injection yesterday and did fine. Today 10/25/18 about 2:00 pm her left arm became itching and became worse.  She has had an anaphylactic reaction before an is now concerned due to this delayed itching. She has taken Zyrtec @ 2 o'clock today seemed to help a little.  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  Patient reports her face felt flush still kind of does.    Left arm: There is a visible light rash in there area where she reports allergy injection given.    She denies:  Difficulty breathing, difficulty swallowing, swelling of tongue/back of mouth, inability to speak, chest pain, faintness/dizziness, change in vision, confusion, speaking in short words, sudden onset of hoarseness.     Nursing Advice: Patient to be seen in urgent care for further evaluation.  I have concerns that this will reappear after she leaves the clinic.  Patient verbalizes good understanding, agrees with plan and states she needs no further support. She was checked in for urgent care.Deena Pena R.N.

## 2018-10-25 NOTE — Clinical Note
Cecilio Estes! I saw your patient who may have had a very mild local reaction to her allergy shots? There was no allergy on call for steffen when we called nurse line. But I suppose I couldve called [facility] but patient declined. It was very minimal. Just wanted to know your input to see if you agreed with plan. Gave her warning signs and precautions and advised her to just continue zyrtec as needed itching. Thanks! - Destinee

## 2018-10-25 NOTE — MR AVS SNAPSHOT
After Visit Summary   10/25/2018    Kaleigh Tyson    MRN: [id number]           Patient Information     Date Of Birth          1960        Visit Information        Provider Department      10/25/2018 5:00 PM Gisela Shipman MD [facility] [place]        Today's Diagnoses     Itchy skin    -  1    Allergy to bee sting        Elevated blood pressure reading without diagnosis of hypertension        Anxiety           Follow-ups after your visit        Your next 10 appointments already scheduled     Oct 29, 2018  3:00 PM CDT   Nurse Only with AN ALLERGY SHOTS   [facility] [place] ([facility] [place])    [address]  [place] [address]   [phone]            Nov 05, 2018  3:00 PM CST   Nurse Only with AN ALLERGY SHOTS   [facility] [place] ([facility] [place])    [address]  [place] [address]   [phone]            Nov 12, 2018  3:00 PM CST   Nurse Only with AN ALLERGY SHOTS   [facility] [place] ([facility] [place])    [address]  [place] [address]   [phone]            Nov 19, 2018  3:00 PM CST   Nurse Only with AN ALLERGY SHOTS   [facility] [place] ([facility] [place])    [address]  [place] [address]   [phone]            Nov 26, 2018  3:00 PM CST   Nurse Only with AN ALLERGY SHOTS   [facility] [place] ([facility] [place])    [address]  [place] [address] [phone]              Who to contact     If you have questions or need follow up information about today's clinic visit or your schedule please contact [facility] directly at [phone].  Normal or non-critical lab and imaging results will be communicated to you by MyChart, letter or phone within 4 business days after the clinic has received the results. If you do not hear from us within 7 days, please contact the clinic through MyChart or phone. If you have a critical or  abnormal lab result, we will notify you by phone as soon as possible.  Submit refill requests through Idhasoft or call your pharmacy and they will forward the refill request to us. Please allow 3 business days for your refill to be completed.          Additional Information About Your Visit        Care EveryWhere ID     This is your Care EveryWhere ID. This could be used by other organizations to access your [place] medical records  [id number]        Your Vitals Were     Pulse Temperature Respirations Pulse Oximetry          69 97.3  F (36.3  C) (Oral) 14 100%         Blood Pressure from Last 3 Encounters:   10/25/18 140/80   10/25/18 161/84   08/07/18 130/80    Weight from Last 3 Encounters:   10/25/18 148 lb (67.1 kg)   08/07/18 145 lb (65.8 kg)   07/23/18 144 lb (65.3 kg)              Today, you had the following     No orders found for display       Primary Care Provider Office Phone # Fax #    Lauren Taina Saez [phone] [address] [facility] [address]        Equal Access to Services     [facility]: Hadii aad ku hadasho Soomaali, waaxda luqadaha, qaybta kaalmada adeegyaherman, gloria tomlinson . So [facility] [phone].    ATENCIÓN: Si habla español, tiene a pelaez disposición servicios gratuitos de asistencia lingüística. [facility] [phone].    We comply with applicable federal civil rights laws and [place] laws. We do not discriminate on the basis of race, color, national origin, age, disability, sex, sexual orientation, or gender identity.            Thank you!     Thank you for choosing [facility]  for your care. Our goal is always to provide you with excellent care. Hearing back from our patients is one way we can continue to improve our services. Please take a few minutes to complete the written survey that you may receive in the mail after your visit with us. Thank you!             Your Updated Medication List - Protect others around  you: Learn how to safely use, store and throw away your medicines at www.disposemymeds.org.          This list is accurate as of 10/25/18 11:31 PM.  Always use your most recent med list.                   Brand Name Dispense Instructions for use Diagnosis    EPINEPHrine 0.3 MG/0.3ML injection 2-pack    EPIPEN/ADRENACLICK/or ANY BX GENERIC EQUIV    0.6 mL    Inject 0.3 mLs (0.3 mg) into the muscle as needed for anaphylaxis    Anaphylactic reaction to bee sting, accidental or unintentional, subsequent encounter       ORDER FOR ALLERGEN IMMUNOTHERAPY 5 mL vial     12 mL    Wasp Venom 100 mcg/mL HS 12 ml Diluent: HSA qs to 5ml    Anaphylaxis due to hymenoptera venom, accidental or unintentional, subsequent encounter       ORDER FOR ALLERGEN IMMUNOTHERAPY 5 mL vial     12 mL    Mixed Vespid Venom 300 mcg/mL HS 12 ml Diluent: HSA qs to 5ml    Anaphylaxis due to hymenoptera venom, accidental or unintentional, subsequent encounter

## 2018-10-25 NOTE — MR AVS SNAPSHOT
After Visit Summary   10/25/2018    Kaleigh Tyson    MRN: [id number]           Patient Information     Date Of Birth          1960        Visit Information        Provider Department      10/25/2018 4:00 PM Awais-InDestiny, RN [facility] [place]        Today's Diagnoses     Rash    -  1       Follow-ups after your visit        Your next 10 appointments already scheduled     Oct 25, 2018  5:00 PM CDT   Team Short with [facility]   [facility] [place] ([facility] [place])    [address]  [place] [address]   [phone]            Oct 29, 2018  3:00 PM CDT   Nurse Only with AN ALLERGY SHOTS   [facility] [place] ([facility] [place])    [address]  [place] [address]   [phone]            Nov 05, 2018  3:00 PM CST   Nurse Only with AN ALLERGY SHOTS   [facility] [place] ([facility] [place])    [address]  [place] [address]   [phone]            Nov 12, 2018  3:00 PM CST   Nurse Only with AN ALLERGY SHOTS   [facility] [place] ([facility] [place])    [address]  [place] [address]   [phone]            Nov 19, 2018  3:00 PM CST   Nurse Only with AN ALLERGY SHOTS   [facility] [place] ([facility] [place])    [address]  [place] [address]   [phone]            Nov 26, 2018  3:00 PM CST   Nurse Only with AN ALLERGY SHOTS   [facility] [place] ([facility] [place])    [address] [facility] [address] [facility]  [place] [address] [phone]              Who to contact     If you have questions or need follow up information about today's clinic visit or your schedule please contact [facility] directly at [phone].  Normal or non-critical lab and imaging results will be communicated to you by MyChart, letter or phone within 4 business days after the clinic has received the results. If you do not hear from us within 7 days, please contact the  clinic through Talking Layers[employer] or phone. If you have a critical or abnormal lab result, we will notify you by phone as soon as possible.  Submit refill requests through Sokoost or call your pharmacy and they will forward the refill request to us. Please allow 3 business days for your refill to be completed.          Additional Information About Your Visit        Care EveryWhere ID     This is your Care EveryWhere ID. This could be used by other organizations to access your [place] medical records  [id number]        Your Vitals Were     Pulse Temperature Pulse Oximetry BMI (Body Mass Index)          66 97.3  F (36.3  C) (Oral) 97% 25.21 kg/m2         Blood Pressure from Last 3 Encounters:   10/25/18 161/84   08/07/18 130/80   07/23/18 140/80    Weight from Last 3 Encounters:   10/25/18 148 lb (67.1 kg)   08/07/18 145 lb (65.8 kg)   07/23/18 144 lb (65.3 kg)              Today, you had the following     No orders found for display       Primary Care Provider Office Phone # Fax #    Lauren Saez [phone] [address] [facility] [address]        Equal Access to Services     NAJMA BRADLEY : Hadii ruth stone hadmarthao Somaricel, waaxda luqadaha, qaybta kaalmada adegiayada, gloria jimenez. So [facility] [phone].    ATENCIÓN: Si habla español, tiene a pelaez disposición servicios gratuitos de asistencia lingüística. [facility] [phone].    We comply with applicable federal civil rights laws and [place] laws. We do not discriminate on the basis of race, color, national origin, age, disability, sex, sexual orientation, or gender identity.            Thank you!     Thank you for choosing [facility]  for your care. Our goal is always to provide you with excellent care. Hearing back from our patients is one way we can continue to improve our services. Please take a few minutes to complete the written survey that you may receive in the mail after your visit with us.  Thank you!             Your Updated Medication List - Protect others around you: Learn how to safely use, store and throw away your medicines at www.disposemymeds.org.          This list is accurate as of 10/25/18  4:48 PM.  Always use your most recent med list.                   Brand Name Dispense Instructions for use Diagnosis    EPINEPHrine 0.3 MG/0.3ML injection 2-pack    EPIPEN/ADRENACLICK/or ANY BX GENERIC EQUIV    0.6 mL    Inject 0.3 mLs (0.3 mg) into the muscle as needed for anaphylaxis    Anaphylactic reaction to bee sting, accidental or unintentional, subsequent encounter       ORDER FOR ALLERGEN IMMUNOTHERAPY 5 mL vial     12 mL    Wasp Venom 100 mcg/mL HS 12 ml Diluent: HSA qs to 5ml    Anaphylaxis due to hymenoptera venom, accidental or unintentional, subsequent encounter       ORDER FOR ALLERGEN IMMUNOTHERAPY 5 mL vial     12 mL    Mixed Vespid Venom 300 mcg/mL HS 12 ml Diluent: HSA qs to 5ml    Anaphylaxis due to hymenoptera venom, accidental or unintentional, subsequent encounter

## 2018-10-29 ENCOUNTER — ALLIED HEALTH/NURSE VISIT (OUTPATIENT)
Dept: ALLERGY | Facility: CLINIC | Age: 58
End: 2018-10-29
Payer: COMMERCIAL

## 2018-10-29 DIAGNOSIS — T63.441D TOXIC EFFECT OF VENOM OF BEES, UNINTENTIONAL, SUBSEQUENT ENCOUNTER: Primary | ICD-10-CM

## 2018-10-29 PROCEDURE — 95117 IMMUNOTHERAPY INJECTIONS: CPT

## 2018-10-29 NOTE — MR AVS SNAPSHOT
After Visit Summary   10/29/2018    Kaleigh Tyson    MRN: [id number]           Patient Information     Date Of Birth          1960        Visit Information        Provider Department      10/29/2018 3:00 PM AN ALLERGY SHOTS [facility] [place]        Today's Diagnoses     Toxic effect of venom of bees, unintentional, subsequent encounter    -  1       Follow-ups after your visit        Your next 10 appointments already scheduled     Nov 05, 2018  3:00 PM CST   Nurse Only with AN ALLERGY SHOTS   [facility] [place] ([facility] [place])    [address]  [place] [address]   [phone]            Nov 12, 2018  3:00 PM CST   Nurse Only with AN ALLERGY SHOTS   [facility] [place] ([facility] [place])    [address]  [place] [address]   [phone]            Nov 15, 2018  4:15 PM CST   New Visit with Easton Schwartz MD   [facility] [place] ([facility] [place])    [address]  [place] [address]   [phone]            Nov 19, 2018  3:00 PM CST   Nurse Only with AN ALLERGY SHOTS   [facility] [place] ([facility] [place])    [address]  [place] [address]   [phone]            Nov 26, 2018  3:00 PM CST   Nurse Only with AN ALLERGY SHOTS   [facility] [place] ([facility] [place])    [address]  [place] [address] [phone]              Who to contact     If you have questions or need follow up information about today's clinic visit or your schedule please contact [facility] directly at [phone].  Normal or non-critical lab and imaging results will be communicated to you by MyChart, letter or phone within 4 business days after the clinic has received the results. If you do not hear from us within 7 days, please contact the clinic through MyChart or phone. If you have a critical or abnormal lab result, we will notify you by phone as soon as  possible.  Submit refill requests through Biodirection or call your pharmacy and they will forward the refill request to us. Please allow 3 business days for your refill to be completed.          Additional Information About Your Visit        Care EveryWhere ID     This is your Care EveryWhere ID. This could be used by other organizations to access your [place] medical records  [id number]         Blood Pressure from Last 3 Encounters:   10/25/18 140/80   10/25/18 161/84   08/07/18 130/80    Weight from Last 3 Encounters:   10/25/18 67.1 kg (148 lb)   08/07/18 65.8 kg (145 lb)   07/23/18 65.3 kg (144 lb)              We Performed the Following     Allergy Shot: Two or more injections        Primary Care Provider Office Phone # Fax #    Lauren Saez [phone] [address] [facility] [address]        Equal Access to Services     [facility]: Hadii aad ku hadasho Soomaali, waaxda luqadaha, qaybta kaalmada adeegyada, waxay cassandrain hayaan sofya tomlinson . So [facility] [phone].    ATENCIÓN: Si habla español, tiene a pelaez disposición servicios gratuitos de asistencia lingüística. [facility] [phone].    We comply with applicable federal civil rights laws and [place] laws. We do not discriminate on the basis of race, color, national origin, age, disability, sex, sexual orientation, or gender identity.            Thank you!     Thank you for choosing [facility]  for your care. Our goal is always to provide you with excellent care. Hearing back from our patients is one way we can continue to improve our services. Please take a few minutes to complete the written survey that you may receive in the mail after your visit with us. Thank you!             Your Updated Medication List - Protect others around you: Learn how to safely use, store and throw away your medicines at www.disposemymeds.org.          This list is accurate as of 10/29/18  3:09 PM.  Always use your most  recent med list.                   Brand Name Dispense Instructions for use Diagnosis    EPINEPHrine 0.3 MG/0.3ML injection 2-pack    EPIPEN/ADRENACLICK/or ANY BX GENERIC EQUIV    0.6 mL    Inject 0.3 mLs (0.3 mg) into the muscle as needed for anaphylaxis    Anaphylactic reaction to bee sting, accidental or unintentional, subsequent encounter       ORDER FOR ALLERGEN IMMUNOTHERAPY 5 mL vial     12 mL    Wasp Venom 100 mcg/mL HS 12 ml Diluent: HSA qs to 5ml    Anaphylaxis due to hymenoptera venom, accidental or unintentional, subsequent encounter       ORDER FOR ALLERGEN IMMUNOTHERAPY 5 mL vial     12 mL    Mixed Vespid Venom 300 mcg/mL HS 12 ml Diluent: HSA qs to 5ml    Anaphylaxis due to hymenoptera venom, accidental or unintentional, subsequent encounter

## 2018-11-05 ENCOUNTER — ALLIED HEALTH/NURSE VISIT (OUTPATIENT)
Dept: ALLERGY | Facility: CLINIC | Age: 58
End: 2018-11-05
Payer: COMMERCIAL

## 2018-11-05 DIAGNOSIS — T63.441D TOXIC EFFECT OF VENOM OF BEES, UNINTENTIONAL, SUBSEQUENT ENCOUNTER: Primary | ICD-10-CM

## 2018-11-05 PROCEDURE — 95117 IMMUNOTHERAPY INJECTIONS: CPT

## 2018-11-05 NOTE — MR AVS SNAPSHOT
After Visit Summary   11/5/2018    Kaleigh Tyson    MRN: [id number]           Patient Information     Date Of Birth          1960        Visit Information        Provider Department      11/5/2018 3:00 PM AN ALLERGY SHOTS [facility] [place]        Today's Diagnoses     Toxic effect of venom of bees, unintentional, subsequent encounter    -  1       Follow-ups after your visit        Your next 10 appointments already scheduled     Nov 12, 2018  3:00 PM CST   Nurse Only with AN ALLERGY SHOTS   [facility] [place] ([facility] [place])    [address]  [place] [address]   [phone]            Nov 15, 2018  4:15 PM CST   New Visit with Easton Schwartz MD   [facility] [place] ([facility] [place])    [address]  [place] [address]   [phone]            Nov 19, 2018  3:00 PM CST   Nurse Only with AN ALLERGY SHOTS   [facility] [place] ([facility] [place])    [address]  [place] [address]   [phone]            Nov 26, 2018  3:00 PM CST   Nurse Only with AN ALLERGY SHOTS   [facility] [place] ([facility] [place])    [address]  [place] [address]   [phone]              Who to contact     If you have questions or need follow up information about today's clinic visit or your schedule please contact [facility] directly at [phone].  Normal or non-critical lab and imaging results will be communicated to you by MyChart, letter or phone within 4 business days after the clinic has received the results. If you do not hear from us within 7 days, please contact the clinic through MyChart or phone. If you have a critical or abnormal lab result, we will notify you by phone as soon as possible.  Submit refill requests through [employer] or call your pharmacy and they will forward the refill request to us. Please allow 3 business days for your refill to be completed.           Additional Information About Your Visit        Care EveryWhere ID     This is your Care EveryWhere ID. This could be used by other organizations to access your [place] medical records  [id number]         Blood Pressure from Last 3 Encounters:   10/25/18 140/80   10/25/18 161/84   08/07/18 130/80    Weight from Last 3 Encounters:   10/25/18 67.1 kg (148 lb)   08/07/18 65.8 kg (145 lb)   07/23/18 65.3 kg (144 lb)              We Performed the Following     Allergy Shot: Two or more injections        Primary Care Provider Office Phone # Fax #    Lauren Taina Saez [phone] [address] [facility] [address]        Equal Access to Services     KARRIE BRADLEY : Hadii aad ku hadasho Somaricel, waaxda luqadaha, qaybta kaalmada adeegyada, waxay cassandrain hayanson tomlinson . So [facility] [phone].    ATENCIÓN: Si habla español, tiene a pelaez disposición servicios gratuitos de asistencia lingüística. [facility] [phone].    We comply with applicable federal civil rights laws and [place] laws. We do not discriminate on the basis of race, color, national origin, age, disability, sex, sexual orientation, or gender identity.            Thank you!     Thank you for choosing [facility] [place]  for your care. Our goal is always to provide you with excellent care. Hearing back from our patients is one way we can continue to improve our services. Please take a few minutes to complete the written survey that you may receive in the mail after your visit with us. Thank you!             Your Updated Medication List - Protect others around you: Learn how to safely use, store and throw away your medicines at www.disposemymeds.org.          This list is accurate as of 11/5/18  3:13 PM.  Always use your most recent med list.                   Brand Name Dispense Instructions for use Diagnosis    EPINEPHrine 0.3 MG/0.3ML injection 2-pack    EPIPEN/ADRENACLICK/or ANY BX GENERIC EQUIV    0.6 mL     Inject 0.3 mLs (0.3 mg) into the muscle as needed for anaphylaxis    Anaphylactic reaction to bee sting, accidental or unintentional, subsequent encounter       ORDER FOR ALLERGEN IMMUNOTHERAPY 5 mL vial     12 mL    Wasp Venom 100 mcg/mL HS 12 ml Diluent: HSA qs to 5ml    Anaphylaxis due to hymenoptera venom, accidental or unintentional, subsequent encounter       ORDER FOR ALLERGEN IMMUNOTHERAPY 5 mL vial     12 mL    Mixed Vespid Venom 300 mcg/mL HS 12 ml Diluent: HSA qs to 5ml    Anaphylaxis due to hymenoptera venom, accidental or unintentional, subsequent encounter

## 2018-11-12 ENCOUNTER — ALLIED HEALTH/NURSE VISIT (OUTPATIENT)
Dept: ALLERGY | Facility: CLINIC | Age: 58
End: 2018-11-12
Payer: COMMERCIAL

## 2018-11-12 DIAGNOSIS — T63.441D TOXIC EFFECT OF VENOM OF BEES, UNINTENTIONAL, SUBSEQUENT ENCOUNTER: Primary | ICD-10-CM

## 2018-11-12 PROCEDURE — 95117 IMMUNOTHERAPY INJECTIONS: CPT

## 2018-11-12 NOTE — MR AVS SNAPSHOT
After Visit Summary   11/12/2018    Kaleigh Tyson    MRN: [id number]           Patient Information     Date Of Birth          1960        Visit Information        Provider Department      11/12/2018 2:20 PM AN ALLERGY SHOTS [facility] [place]        Today's Diagnoses     Toxic effect of venom of bees, unintentional, subsequent encounter    -  1       Follow-ups after your visit        Your next 10 appointments already scheduled     Nov 15, 2018  4:15 PM CST   New Visit with Easton Schwartz MD   [facility] [place] ([facility] [place])    [address]  [place] [address]   [phone]            Nov 19, 2018  3:00 PM CST   Nurse Only with AN ALLERGY SHOTS   [facility] [place] ([facility] [place])    [address]  [place] [address]   [phone]            Nov 26, 2018  3:00 PM CST   Nurse Only with AN ALLERGY SHOTS   [facility] [place] ([facility] [place])    [address]  [place] [address]   [phone]            Dec 03, 2018  3:00 PM CST   Nurse Only with AN ALLERGY SHOTS   [facility] [place] ([facility] [place])    [address]  [place] [address]   [phone]            Dec 10, 2018  3:00 PM CST   Nurse Only with AN ALLERGY SHOTS   [facility] [place] ([facility] [place])    [address]  [place] [address]   [phone]            Dec 17, 2018  3:00 PM CST   Nurse Only with AN ALLERGY SHOTS   [facility] [place] ([facility] [place])    [address]  [place] [address]   [phone]            Dec 26, 2018  4:40 PM CST   Nurse Only with AN ALLERGY SHOTS   [facility] [place] ([facility] [place])    [address]  [place] [address]   [phone]            Dec 31, 2018  3:10 PM CST   Nurse Only with AN ALLERGY SHOTS   [facility] [place] ([facility] [place])    [address]  [place] [address]    [phone]              Who to contact     If you have questions or need follow up information about today's clinic visit or your schedule please contact [facility] directly at [phone].  Normal or non-critical lab and imaging results will be communicated to you by MyChart, letter or phone within 4 business days after the clinic has received the results. If you do not hear from us within 7 days, please contact the clinic through MyChart or phone. If you have a critical or abnormal lab result, we will notify you by phone as soon as possible.  Submit refill requests through [employer] or call your pharmacy and they will forward the refill request to us. Please allow 3 business days for your refill to be completed.          Additional Information About Your Visit        Care EveryWhere ID     This is your Care EveryWhere ID. This could be used by other organizations to access your [place] medical records  [id number]         Blood Pressure from Last 3 Encounters:   10/25/18 140/80   10/25/18 161/84   08/07/18 130/80    Weight from Last 3 Encounters:   10/25/18 67.1 kg (148 lb)   08/07/18 65.8 kg (145 lb)   07/23/18 65.3 kg (144 lb)              We Performed the Following     Allergy Shot: Two or more injections        Primary Care Provider Office Phone # Fax #    Lauren Taina Saez [phone] [address] [address] [address]        Equal Access to Services     [facility] : Hadii aad ku hadasho Soomaali, waaxda luqadaha, qaybta kaalmada adeegyada, gloria tomlinson . So [facility] [phone].    ATENCIÓN: Si habla español, tiene a pelaez disposición servicios gratuitos de asistencia lingüística. [facility] [phone].    We comply with applicable federal civil rights laws and [place] laws. We do not discriminate on the basis of race, color, national origin, age, disability, sex, sexual orientation, or gender identity.            Thank you!     Thank  you for [facility]. Our goal is always to provide you with excellent care. Hearing back from our patients is one way we can continue to improve our services. Please take a few minutes to complete the written survey that you may receive in the mail after your visit with us. Thank you!             Your Updated Medication List - Protect others around you: Learn how to safely use, store and throw away your medicines at www.disposemymeds.org.          This list is accurate as of 11/12/18  3:24 PM.  Always use your most recent med list.                   Brand Name Dispense Instructions for use Diagnosis    EPINEPHrine 0.3 MG/0.3ML injection 2-pack    EPIPEN/ADRENACLICK/or ANY BX GENERIC EQUIV    0.6 mL    Inject 0.3 mLs (0.3 mg) into the muscle as needed for anaphylaxis    Anaphylactic reaction to bee sting, accidental or unintentional, subsequent encounter       ORDER FOR ALLERGEN IMMUNOTHERAPY 5 mL vial     12 mL    Wasp Venom 100 mcg/mL HS 12 ml Diluent: HSA qs to 5ml    Anaphylaxis due to hymenoptera venom, accidental or unintentional, subsequent encounter       ORDER FOR ALLERGEN IMMUNOTHERAPY 5 mL vial     12 mL    Mixed Vespid Venom 300 mcg/mL HS 12 ml Diluent: HSA qs to 5ml    Anaphylaxis due to hymenoptera venom, accidental or unintentional, subsequent encounter

## 2018-11-15 ENCOUNTER — OFFICE VISIT (OUTPATIENT)
Dept: ORTHOPEDICS | Facility: CLINIC | Age: 58
End: 2018-11-15
Payer: COMMERCIAL

## 2018-11-15 ENCOUNTER — RADIANT APPOINTMENT (OUTPATIENT)
Dept: GENERAL RADIOLOGY | Facility: CLINIC | Age: 58
End: 2018-11-15
Attending: ORTHOPAEDIC SURGERY
Payer: COMMERCIAL

## 2018-11-15 VITALS
WEIGHT: 150 LBS | BODY MASS INDEX: 25.55 KG/M2 | OXYGEN SATURATION: 98 % | DIASTOLIC BLOOD PRESSURE: 87 MMHG | SYSTOLIC BLOOD PRESSURE: 158 MMHG | HEART RATE: 68 BPM

## 2018-11-15 DIAGNOSIS — M25.562 LEFT KNEE PAIN: ICD-10-CM

## 2018-11-15 DIAGNOSIS — S80.02XA CONTUSION OF LEFT KNEE, INITIAL ENCOUNTER: Primary | ICD-10-CM

## 2018-11-15 DIAGNOSIS — M25.562 ACUTE PAIN OF LEFT KNEE: ICD-10-CM

## 2018-11-15 PROCEDURE — 73562 X-RAY EXAM OF KNEE 3: CPT | Mod: LT

## 2018-11-15 PROCEDURE — 99203 OFFICE O/P NEW LOW 30 MIN: CPT | Performed by: ORTHOPAEDIC SURGERY

## 2018-11-15 NOTE — MR AVS SNAPSHOT
After Visit Summary   11/15/2018    Kaleigh Tyson    MRN: [id number]           Patient Information     Date Of Birth          1960        Visit Information        Provider Department      11/15/2018 1:00 PM Easton Schwartz MD [facility] [place]        Today's Diagnoses     Contusion of left knee, initial encounter    -  1    Acute pain of left knee          Care Instructions    Patient to follow up with Primary Care provider regarding elevated blood pressure.            Follow-ups after your visit        Your next 10 appointments already scheduled     Nov 19, 2018  4:50 PM CST   Nurse Only with AN ALLERGY SHOTS   [facility] [place] ([facility] [place])    [address]  [place] [address]   [phone]            Nov 26, 2018  3:00 PM CST   Nurse Only with AN ALLERGY SHOTS   [facility] [place] ([facility] [place])    [address]  [place] [address]   [phone]            Dec 03, 2018  3:00 PM CST   Nurse Only with AN ALLERGY SHOTS   [facility] [place] ([facility] [place])    [address]  [place] [address]   [phone]            Dec 10, 2018  3:00 PM CST   Nurse Only with AN ALLERGY SHOTS   [facility] [place] ([facility] [place])    [address]  [place] [address]   [phone]            Dec 17, 2018  3:00 PM CST   Nurse Only with AN ALLERGY SHOTS   [facility] [place] ([facility] [place])    [address]  [place] [address]   [phone]            Dec 26, 2018  4:40 PM CST   Nurse Only with AN ALLERGY SHOTS   [facility] [place] ([facility] [place])    [address]  [place] [address]   [phone]            Dec 31, 2018  3:10 PM CST   Nurse Only with AN ALLERGY SHOTS   [facility] [place] ([facility] [place])    [address]  [place] [address]   [phone]              Who to contact     If you have questions  or need follow up information about today's clinic visit or your schedule please contact [facility] directly at [phone].  Normal or non-critical lab and imaging results will be communicated to you by MyChart, letter or phone within 4 business days after the clinic has received the results. If you do not hear from us within 7 days, please contact the clinic through MyChart or phone. If you have a critical or abnormal lab result, we will notify you by phone as soon as possible.  Submit refill requests through [employer] or call your pharmacy and they will forward the refill request to us. Please allow 3 business days for your refill to be completed.          Additional Information About Your Visit        Care EveryWhere ID     This is your Care EveryWhere ID. This could be used by other organizations to access your [facility] medical records  [id number]        Your Vitals Were     Pulse Pulse Oximetry BMI (Body Mass Index)             68 98% 25.55 kg/m2          Blood Pressure from Last 3 Encounters:   11/15/18 158/87   10/25/18 140/80   10/25/18 161/84    Weight from Last 3 Encounters:   11/15/18 68 kg (150 lb)   10/25/18 67.1 kg (148 lb)   08/07/18 65.8 kg (145 lb)               Primary Care Provider Office Phone # Fax #    Lauren Taina Saez [phone] [address] [facility] [address]        Equal Access to Services     [facility]: Hadii aad ku hadasho Soomaali, waaxda luqadaha, qaybta kaalmada adeegyada, gloria tomlinson . So [facility] [phone].    ATENCIÓN: Si habla español, tiene a pelaez disposición servicios gratuitos de asistencia lingüística. [facility] [phone].    We comply with applicable federal civil rights laws and [place] laws. We do not discriminate on the basis of race, color, national origin, age, disability, sex, sexual orientation, or gender identity.            Thank you!     Thank you for choosing [facility]   for your care. Our goal is always to provide you with excellent care. Hearing back from our patients is one way we can continue to improve our services. Please take a few minutes to complete the written survey that you may receive in the mail after your visit with us. Thank you!             Your Updated Medication List - Protect others around you: Learn how to safely use, store and throw away your medicines at www.disposemymeds.org.          This list is accurate as of 11/15/18  1:17 PM.  Always use your most recent med list.                   Brand Name Dispense Instructions for use Diagnosis    EPINEPHrine 0.3 MG/0.3ML injection 2-pack    EPIPEN/ADRENACLICK/or ANY BX GENERIC EQUIV    0.6 mL    Inject 0.3 mLs (0.3 mg) into the muscle as needed for anaphylaxis    Anaphylactic reaction to bee sting, accidental or unintentional, subsequent encounter       ORDER FOR ALLERGEN IMMUNOTHERAPY 5 mL vial     12 mL    Wasp Venom 100 mcg/mL HS 12 ml Diluent: HSA qs to 5ml    Anaphylaxis due to hymenoptera venom, accidental or unintentional, subsequent encounter       ORDER FOR ALLERGEN IMMUNOTHERAPY 5 mL vial     12 mL    Mixed Vespid Venom 300 mcg/mL HS 12 ml Diluent: HSA qs to 5ml    Anaphylaxis due to hymenoptera venom, accidental or unintentional, subsequent encounter

## 2018-11-15 NOTE — PROGRESS NOTES
SUBJECTIVE:   Kaleigh Tyson is a 58 year old female who is seen as self referral for evaluation of left knee injury. It has been approximately 2 months since the initial injury.  Dog knocked her over 2 months ago medial knee hit tree root.    Present symptoms: Medial knee pain, swelling,  - locking,      Symptoms occur when: flexion, positional night pain, getting up from chair    Treatments tried to this point: none    Orthopedic PMH: none    Review of Systems:  Constitutional:  NEGATIVE for fever, chills, change in weight  Integumentary/Skin:  NEGATIVE for worrisome rashes, moles or lesions  Eyes:  NEGATIVE for vision changes or irritation  ENT/Mouth:  NEGATIVE for ear, mouth and throat problems  Resp:  NEGATIVE for significant cough or SOB  Breast:  NEGATIVE for masses, tenderness or discharge  CV:  NEGATIVE for chest pain, palpitations or peripheral edema  GI:  NEGATIVE for nausea, abdominal pain, heartburn, or change in bowel habits  :  Negative   Musculoskeletal:  See HPI above  Neuro:  NEGATIVE for weakness, dizziness or paresthesias  Endocrine:  NEGATIVE for temperature intolerance, skin/hair changes  Heme/allergy/immune:  NEGATIVE for bleeding problems  Psychiatric:  NEGATIVE for changes in mood or affect    Past Medical History:   Past Medical History:   Diagnosis Date     Elevated cholesterol     Low HDL - 41 in 1/09     Past Surgical History:   Past Surgical History:   Procedure Laterality Date     NO HISTORY OF SURGERY       Family History:   Family History   Problem Relation Age of Onset     HEART DISEASE Father      Dementia Father      Neurologic Disorder Brother      Lipids Sister      Lipids Sister      Myocardial Infarction Mother      Hypertension Mother      Social History:   Social History   Substance Use Topics     Smoking status: Current Every Day Smoker     Packs/day: 0.40     Types: Cigarettes     Last attempt to quit: 1/1/2008     Smokeless tobacco: Never Used     Alcohol use Yes       Comment: rare     OBJECTIVE:  Physical Exam:  /87 (BP Location: Right arm, Patient Position: Sitting, Cuff Size: Adult Regular)  Pulse 68  Wt 68 kg (150 lb)  SpO2 98%  BMI 25.55 kg/m2  General Appearance: healthy, alert and no distress   Skin: no suspicious lesions or rashes  Neuro: Normal strength and tone, mentation intact and speech normal  Vascular: good pulses, and cappillary refill   Lymph: no lymphadenopathy   Psych:  mentation appears normal and affect normal/bright  Resp: no increased work of breathing     Left Knee Exam:  Gait: walks with normal gait  Alignment: normal   Squat: 80 % limited by pain.    Patellofemoral joint: mild crepitations in the patellofemoral joint.  Effusion: minimal  ROM: 0 extension to 145 flexion, no pain with forced extension  Tender: nontender  Masses: none  Ligaments:   Lachman's: stable    Anterior Drawer: stable    Posterior drawer: stable    Varus/Valgus stress: stable   McMurrays: negative    X-rays:  Obtained XR  of the left knee: butt, sunrise, and lateral-views, reviewed in the office with the patient by myself today and show: No osseus or cartilaginous abnormalities noted    MRI:    None     ASSESSMENT:   Encounter Diagnoses   Name Primary?     Contusion of left knee, initial encounter Yes     Acute pain of left knee        PLAN:   Observe and monitor> She asked about wearing brace, did not feel that this would be helpful.     Return to clinic: as needed     DAMIR Schwartz MD  Dept. Orthopedic Surgery  [facility]     This document serves as a record of the services and decisions personally performed and made by DAMIR Schwartz MD. It was created on their behalf by Mathieu Higginbotham, a trained medical scribe. The creation of this document is based the provider's statements to the medical scribe.     Mathieu Higginbotham November 15, 2018 1:11 PM

## 2018-11-15 NOTE — LETTER
11/15/2018         RE: Kaleigh Tyson  [address] [facility] [address]        Dear Colleague,    Thank you for referring your patient, Kaleigh Tyson, to the [facility]. Please see a copy of my visit note below.    SUBJECTIVE:   Kaleigh Tyson is a 58 year old female who is seen as self referral for evaluation of left knee injury. It has been approximately 2 months since the initial injury.  Dog knocked her over 2 months ago medial knee hit tree root.    Present symptoms: Medial knee pain, swelling,  - locking,      Symptoms occur when: flexion, positional night pain, getting up from chair    Treatments tried to this point: none    Orthopedic PMH: none    Review of Systems:  Constitutional:  NEGATIVE for fever, chills, change in weight  Integumentary/Skin:  NEGATIVE for worrisome rashes, moles or lesions  Eyes:  NEGATIVE for vision changes or irritation  ENT/Mouth:  NEGATIVE for ear, mouth and throat problems  Resp:  NEGATIVE for significant cough or SOB  Breast:  NEGATIVE for masses, tenderness or discharge  CV:  NEGATIVE for chest pain, palpitations or peripheral edema  GI:  NEGATIVE for nausea, abdominal pain, heartburn, or change in bowel habits  :  Negative   Musculoskeletal:  See HPI above  Neuro:  NEGATIVE for weakness, dizziness or paresthesias  Endocrine:  NEGATIVE for temperature intolerance, skin/hair changes  Heme/allergy/immune:  NEGATIVE for bleeding problems  Psychiatric:  NEGATIVE for changes in mood or affect    Past Medical History:   Past Medical History:   Diagnosis Date     Elevated cholesterol     Low HDL - 41 in 1/09     Past Surgical History:   Past Surgical History:   Procedure Laterality Date     NO HISTORY OF SURGERY       Family History:   Family History   Problem Relation Age of Onset     HEART DISEASE Father      Dementia Father      Neurologic Disorder Brother      Lipids Sister      Lipids Sister      Myocardial Infarction Mother      Hypertension Mother       Social History:   Social History   Substance Use Topics     Smoking status: Current Every Day Smoker     Packs/day: 0.40     Types: Cigarettes     Last attempt to quit: 1/1/2008     Smokeless tobacco: Never Used     Alcohol use Yes      Comment: rare     OBJECTIVE:  Physical Exam:  /87 (BP Location: Right arm, Patient Position: Sitting, Cuff Size: Adult Regular)  Pulse 68  Wt 68 kg (150 lb)  SpO2 98%  BMI 25.55 kg/m2  General Appearance: healthy, alert and no distress   Skin: no suspicious lesions or rashes  Neuro: Normal strength and tone, mentation intact and speech normal  Vascular: good pulses, and cappillary refill   Lymph: no lymphadenopathy   Psych:  mentation appears normal and affect normal/bright  Resp: no increased work of breathing     Left Knee Exam:  Gait: walks with normal gait  Alignment: normal   Squat: 80 % limited by pain.    Patellofemoral joint: mild crepitations in the patellofemoral joint.  Effusion: minimal  ROM: 0 extension to 145 flexion, no pain with forced extension  Tender: nontender  Masses: none  Ligaments:   Lachman's: stable    Anterior Drawer: stable    Posterior drawer: stable    Varus/Valgus stress: stable   McMurrays: negative    X-rays:  Obtained XR  of the left knee: butt, sunrise, and lateral-views, reviewed in the office with the patient by myself today and show: No osseus or cartilaginous abnormalities noted    MRI:    None     ASSESSMENT:   Encounter Diagnoses   Name Primary?     Contusion of left knee, initial encounter Yes     Acute pain of left knee        PLAN:   Observe and monitor> She asked about wearing brace, did not feel that this would be helpful.     Return to clinic: as needed     DAMIR Schwartz MD  Dept. Orthopedic Surgery  [facility]     This document serves as a record of the services and decisions personally performed and made by DAMIR Schwartz MD. It was created on their behalf by Mathieu Higginbotham, a trained medical  scribe. The creation of this document is based the provider's statements to the medical scribe.     Mathieu Anahy November 15, 2018 1:11 PM       Again, thank you for allowing me to participate in the care of your patient.        Sincerely,        Easton Schwartz MD

## 2018-11-19 ENCOUNTER — ALLIED HEALTH/NURSE VISIT (OUTPATIENT)
Dept: ALLERGY | Facility: CLINIC | Age: 58
End: 2018-11-19
Payer: COMMERCIAL

## 2018-11-19 DIAGNOSIS — T63.441D TOXIC EFFECT OF VENOM OF BEES, UNINTENTIONAL, SUBSEQUENT ENCOUNTER: Primary | ICD-10-CM

## 2018-11-19 PROCEDURE — 95117 IMMUNOTHERAPY INJECTIONS: CPT

## 2018-11-19 NOTE — MR AVS SNAPSHOT
After Visit Summary   11/19/2018    Kaleigh Tyson    MRN: [id number]           Patient Information     Date Of Birth          1960        Visit Information        Provider Department      11/19/2018 4:50 PM AN ALLERGY SHOTS [facility] [place]        Today's Diagnoses     Toxic effect of venom of bees, unintentional, subsequent encounter    -  1       Follow-ups after your visit        Your next 10 appointments already scheduled     Nov 26, 2018  3:00 PM CST   Nurse Only with AN ALLERGY SHOTS   [facility] [place] ([facility] [place]    [address]  [place] [address]   [phone]            Dec 03, 2018  3:00 PM CST   Nurse Only with AN ALLERGY SHOTS   [facility] [place] ([facility] [place])    [address]  [place] [address]   [phone]            Dec 10, 2018  3:00 PM CST   Nurse Only with AN ALLERGY SHOTS   [facility] [place] ([facility] [place])    [address]  [place] [address]   [phone]            Dec 17, 2018  3:00 PM CST   Nurse Only with AN ALLERGY SHOTS   [facility] [place] ([facility] [place])    [address]  [place] [address]   [phone]            Dec 26, 2018  4:40 PM CST   Nurse Only with AN ALLERGY SHOTS   [facility] [place] ([facility] [place])    [address]  [place] [address]   [phone]            Dec 31, 2018  3:10 PM CST   Nurse Only with AN ALLERGY SHOTS   [facility] [place] ([facility] [place])    [address]  [place] [address]   [phone]              Who to contact     If you have questions or need follow up information about today's clinic visit or your schedule please contact [facility] directly at [phone].  Normal or non-critical lab and imaging results will be communicated to you by MyChart, letter or phone within 4 business days after the clinic has received the results. If you  do not hear from us within 7 days, please contact the clinic through [employer] or phone. If you have a critical or abnormal lab result, we will notify you by phone as soon as possible.  Submit refill requests through [employer] or call your pharmacy and they will forward the refill request to us. Please allow 3 business days for your refill to be completed.          Additional Information About Your Visit        Care EveryWhere ID     This is your Care EveryWhere ID. This could be used by other organizations to access your [place] medical records  [id number]         Blood Pressure from Last 3 Encounters:   11/15/18 158/87   10/25/18 140/80   10/25/18 161/84    Weight from Last 3 Encounters:   11/15/18 68 kg (150 lb)   10/25/18 67.1 kg (148 lb)   08/07/18 65.8 kg (145 lb)              We Performed the Following     Allergy Shot: Two or more injections        Primary Care Provider Office Phone # Fax #    Lauren Taina Saez [phone] [address] [facility] [address]        Equal Access to Services     [facility]: Hadii aad ku hadasho Soomaali, waaxda luqadaha, qaybta kaalmada adeegyaherman, waxlisbeth tomlinson . So [facility] [phone].    ATENCIÓN: Si habla español, tiene a pelaez disposición servicios gratuitos de asistencia lingüística. [facility] [phone].    We comply with applicable federal civil rights laws and [place] laws. We do not discriminate on the basis of race, color, national origin, age, disability, sex, sexual orientation, or gender identity.            Thank you!     Thank you for choosing [facility]  for your care. Our goal is always to provide you with excellent care. Hearing back from our patients is one way we can continue to improve our services. Please take a few minutes to complete the written survey that you may receive in the mail after your visit with us. Thank you!             Your Updated Medication List - Protect others around  you: Learn how to safely use, store and throw away your medicines at www.disposemymeds.org.          This list is accurate as of 11/19/18  5:18 PM.  Always use your most recent med list.                   Brand Name Dispense Instructions for use Diagnosis    EPINEPHrine 0.3 MG/0.3ML injection 2-pack    EPIPEN/ADRENACLICK/or ANY BX GENERIC EQUIV    0.6 mL    Inject 0.3 mLs (0.3 mg) into the muscle as needed for anaphylaxis    Anaphylactic reaction to bee sting, accidental or unintentional, subsequent encounter       ORDER FOR ALLERGEN IMMUNOTHERAPY 5 mL vial     12 mL    Wasp Venom 100 mcg/mL HS 12 ml Diluent: HSA qs to 5ml    Anaphylaxis due to hymenoptera venom, accidental or unintentional, subsequent encounter       ORDER FOR ALLERGEN IMMUNOTHERAPY 5 mL vial     12 mL    Mixed Vespid Venom 300 mcg/mL HS 12 ml Diluent: HSA qs to 5ml    Anaphylaxis due to hymenoptera venom, accidental or unintentional, subsequent encounter

## 2018-11-26 ENCOUNTER — TELEPHONE (OUTPATIENT)
Dept: ALLERGY | Facility: CLINIC | Age: 58
End: 2018-11-26

## 2018-11-26 ENCOUNTER — ALLIED HEALTH/NURSE VISIT (OUTPATIENT)
Dept: ALLERGY | Facility: CLINIC | Age: 58
End: 2018-11-26
Payer: COMMERCIAL

## 2018-11-26 DIAGNOSIS — T63.441D TOXIC EFFECT OF VENOM OF BEES, UNINTENTIONAL, SUBSEQUENT ENCOUNTER: Primary | ICD-10-CM

## 2018-11-26 PROCEDURE — 99207 ZZC DROP WITH A PROCEDURE: CPT

## 2018-11-26 PROCEDURE — 95117 IMMUNOTHERAPY INJECTIONS: CPT

## 2018-11-26 NOTE — TELEPHONE ENCOUNTER
RN spoke with patient. 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 RN explained that the trip would be ok, but she should bring her EpiPen with in case she is stung. No further questions or concerns.    Tammi Do RN

## 2018-11-26 NOTE — MR AVS SNAPSHOT
After Visit Summary   11/26/2018    Kaleigh Tyson    MRN: [id number]           Patient Information     Date Of Birth          1960        Visit Information        Provider Department      11/26/2018 9:20 AM AN ALLERGY SHOTS [facility] [place]        Today's Diagnoses     Toxic effect of venom of bees, unintentional, subsequent encounter    -  1       Follow-ups after your visit        Your next 10 appointments already scheduled     Dec 03, 2018  3:00 PM CST   Nurse Only with AN ALLERGY SHOTS   [facility] [place] ([facility] [place]    [address]  [place] [address]   [phone]            Dec 10, 2018  3:00 PM CST   Nurse Only with AN ALLERGY SHOTS   [facility] [place] ([facility] [place]    [address]  [place] [address] [phone]            Dec 17, 2018  3:00 PM CST   Nurse Only with AN ALLERGY SHOTS   [facility] [place] ([facility] [place])    [address]  [place] [address]   [phone]            Dec 26, 2018  4:40 PM CST   Nurse Only with AN ALLERGY SHOTS   [facility] [place] ([facility] [place])    [address]  [place] [address] [phone]            Dec 31, 2018  3:10 PM CST   Nurse Only with AN ALLERGY SHOTS   [facility] [place] ([facility] [place])    [address]  [place] [address] [phone]              Who to contact     If you have questions or need follow up information about today's clinic visit or your schedule please contact [facility] directly at [phone].  Normal or non-critical lab and imaging results will be communicated to you by MyChart, letter or phone within 4 business days after the clinic has received the results. If you do not hear from us within 7 days, please contact the clinic through MyChart or phone. If you have a critical or abnormal lab result, we will notify you by phone as soon as  possible.  Submit refill requests through [employer] or call your pharmacy and they will forward the refill request to us. Please allow 3 business days for your refill to be completed.          Additional Information About Your Visit        Care EveryWhere ID     This is your Care EveryWhere ID. This could be used by other organizations to access your [place] medical records  [id number]         Blood Pressure from Last 3 Encounters:   11/15/18 158/87   10/25/18 140/80   10/25/18 161/84    Weight from Last 3 Encounters:   11/15/18 68 kg (150 lb)   10/25/18 67.1 kg (148 lb)   08/07/18 65.8 kg (145 lb)              We Performed the Following     Allergy Shot: Two or more injections        Primary Care Provider Office Phone # Fax #    Lauren Saez [phone] [address] [facility] [address]        Equal Access to Services     [facility]: Hadii aad ku hadasho Soomaali, waaxda luqadaha, qaybta kaalmada adeegyada, waxay cassandrain hayaan sofya tomlinson . So [facility] [phone].    ATENCIÓN: Si habla español, tiene a pelaez disposición servicios gratuitos de asistencia lingüística. [facility] [phone].    We comply with applicable federal civil rights laws and [place] laws. We do not discriminate on the basis of race, color, national origin, age, disability, sex, sexual orientation, or gender identity.            Thank you!     Thank you for choosing [facility]  for your care. Our goal is always to provide you with excellent care. Hearing back from our patients is one way we can continue to improve our services. Please take a few minutes to complete the written survey that you may receive in the mail after your visit with us. Thank you!             Your Updated Medication List - Protect others around you: Learn how to safely use, store and throw away your medicines at www.disposemymeds.org.          This list is accurate as of 11/26/18  9:42 AM.  Always use your most  recent med list.                   Brand Name Dispense Instructions for use Diagnosis    EPINEPHrine 0.3 MG/0.3ML injection 2-pack    EPIPEN/ADRENACLICK/or ANY BX GENERIC EQUIV    0.6 mL    Inject 0.3 mLs (0.3 mg) into the muscle as needed for anaphylaxis    Anaphylactic reaction to bee sting, accidental or unintentional, subsequent encounter       ORDER FOR ALLERGEN IMMUNOTHERAPY 5 mL vial     12 mL    Wasp Venom 100 mcg/mL HS 12 ml Diluent: HSA qs to 5ml    Anaphylaxis due to hymenoptera venom, accidental or unintentional, subsequent encounter       ORDER FOR ALLERGEN IMMUNOTHERAPY 5 mL vial     12 mL    Mixed Vespid Venom 300 mcg/mL HS 12 ml Diluent: HSA qs to 5ml    Anaphylaxis due to hymenoptera venom, accidental or unintentional, subsequent encounter

## 2018-11-26 NOTE — TELEPHONE ENCOUNTER
Patient is calling would like to discuss a few questions in regards to taking a trip to [place] and others per pt. Please call to discuss. Thank you.

## 2018-12-05 ENCOUNTER — ALLIED HEALTH/NURSE VISIT (OUTPATIENT)
Dept: ALLERGY | Facility: CLINIC | Age: 58
End: 2018-12-05
Payer: COMMERCIAL

## 2018-12-05 DIAGNOSIS — J30.9 ALLERGIC RHINITIS: Primary | ICD-10-CM

## 2018-12-05 PROCEDURE — 99207 ZZC DROP WITH A PROCEDURE: CPT

## 2018-12-05 PROCEDURE — 95117 IMMUNOTHERAPY INJECTIONS: CPT

## 2018-12-05 NOTE — PROGRESS NOTES
Patient presented after waiting 30 minutes with no reaction to allergy injections. Discharged from clinic.    Hussain Young RN....12/5/2018 2:37 PM

## 2018-12-05 NOTE — MR AVS SNAPSHOT
After Visit Summary   12/5/2018    Kaleigh Tyson    MRN: [id number]           Patient Information     Date Of Birth          1960        Visit Information        Provider Department      12/5/2018 2:20 PM AN ALLERGY SHOTS [facility] [place]        Today's Diagnoses     Allergic rhinitis    -  1       Follow-ups after your visit        Your next 10 appointments already scheduled     Dec 10, 2018  3:00 PM CST   Nurse Only with AN ALLERGY SHOTS   [facility] [place] ([facility] [place]    [address]  [place] [address]   [phone]            Dec 17, 2018  3:00 PM CST   Nurse Only with AN ALLERGY SHOTS   [facility] [place] ([facility] [place])    [address]  [place] [address]   [phone]            Dec 26, 2018  4:40 PM CST   Nurse Only with AN ALLERGY SHOTS   [facility] [place] ([facility] [place])    [address]  [place] [address] [phone]            Dec 31, 2018  3:10 PM CST   Nurse Only with AN ALLERGY SHOTS   [facility] [place] ([facility] [place])    [address]  [place] [address] [phone]              Who to contact     If you have questions or need follow up information about today's clinic visit or your schedule please contact [facility] directly at [phone].  Normal or non-critical lab and imaging results will be communicated to you by MyChart, letter or phone within 4 business days after the clinic has received the results. If you do not hear from us within 7 days, please contact the clinic through MyChart or phone. If you have a critical or abnormal lab result, we will notify you by phone as soon as possible.  Submit refill requests through [employer] or call your pharmacy and they will forward the refill request to us. Please allow 3 business days for your refill to be completed.          Additional Information About Your Visit        Care EveryWhere  ID     This is your Care EveryWhere ID. This could be used by other organizations to access your [place] medical records  [id number]         Blood Pressure from Last 3 Encounters:   11/15/18 158/87   10/25/18 140/80   10/25/18 161/84    Weight from Last 3 Encounters:   11/15/18 68 kg (150 lb)   10/25/18 67.1 kg (148 lb)   08/07/18 65.8 kg (145 lb)              We Performed the Following     Allergy Shot: Two or more injections        Primary Care Provider Office Phone # Fax #    Lauren Taina Saez [phone] [address] [facility] [address]        Equal Access to Services     [facility] : Hadii aad ku hadasho Somaricel, waaxda luqadaha, qaybta kaalmada adeegyada, gloria tomlinson . So [facility] [phone].    ATENCIÓN: Si habla español, tiene a pelaez disposición servicios gratuitos de asistencia lingüística. [facility] al [phone].    We comply with applicable federal civil rights laws and [place] laws. We do not discriminate on the basis of race, color, national origin, age, disability, sex, sexual orientation, or gender identity.            Thank you!     Thank you for choosing [facility]  for your care. Our goal is always to provide you with excellent care. Hearing back from our patients is one way we can continue to improve our services. Please take a few minutes to complete the written survey that you may receive in the mail after your visit with us. Thank you!             Your Updated Medication List - Protect others around you: Learn how to safely use, store and throw away your medicines at www.disposemymeds.org.          This list is accurate as of 12/5/18  2:40 PM.  Always use your most recent med list.                   Brand Name Dispense Instructions for use Diagnosis    EPINEPHrine 0.3 MG/0.3ML injection 2-pack    EPIPEN/ADRENACLICK/or ANY BX GENERIC EQUIV    0.6 mL    Inject 0.3 mLs (0.3 mg) into the muscle as needed for anaphylaxis     Anaphylactic reaction to bee sting, accidental or unintentional, subsequent encounter       ORDER FOR ALLERGEN IMMUNOTHERAPY 5 mL vial     12 mL    Wasp Venom 100 mcg/mL HS 12 ml Diluent: HSA qs to 5ml    Anaphylaxis due to hymenoptera venom, accidental or unintentional, subsequent encounter       ORDER FOR ALLERGEN IMMUNOTHERAPY 5 mL vial     12 mL    Mixed Vespid Venom 300 mcg/mL HS 12 ml Diluent: HSA qs to 5ml    Anaphylaxis due to hymenoptera venom, accidental or unintentional, subsequent encounter

## 2018-12-12 ENCOUNTER — ALLIED HEALTH/NURSE VISIT (OUTPATIENT)
Dept: ALLERGY | Facility: CLINIC | Age: 58
End: 2018-12-12
Payer: COMMERCIAL

## 2018-12-12 DIAGNOSIS — T63.441D TOXIC EFFECT OF VENOM OF BEES, UNINTENTIONAL, SUBSEQUENT ENCOUNTER: Primary | ICD-10-CM

## 2018-12-12 PROCEDURE — 99207 ZZC DROP WITH A PROCEDURE: CPT

## 2018-12-12 PROCEDURE — 95117 IMMUNOTHERAPY INJECTIONS: CPT

## 2018-12-12 NOTE — PROGRESS NOTES
Patient presented after waiting 30 minutes with normal reaction to allergy injections. Discharged from clinic.    Silvia aMrie RN ............   12/12/2018...2:35 PM

## 2018-12-19 ENCOUNTER — ALLIED HEALTH/NURSE VISIT (OUTPATIENT)
Dept: ALLERGY | Facility: CLINIC | Age: 58
End: 2018-12-19
Payer: COMMERCIAL

## 2018-12-19 DIAGNOSIS — T63.441D TOXIC EFFECT OF VENOM OF BEES, UNINTENTIONAL, SUBSEQUENT ENCOUNTER: Primary | ICD-10-CM

## 2018-12-19 PROCEDURE — 95117 IMMUNOTHERAPY INJECTIONS: CPT

## 2018-12-19 PROCEDURE — 99207 ZZC DROP WITH A PROCEDURE: CPT

## 2018-12-20 ENCOUNTER — TELEPHONE (OUTPATIENT)
Dept: FAMILY MEDICINE | Facility: CLINIC | Age: 58
End: 2018-12-20

## 2018-12-20 ENCOUNTER — APPOINTMENT (OUTPATIENT)
Dept: NURSING | Facility: CLINIC | Age: 58
End: 2018-12-20
Payer: COMMERCIAL

## 2018-12-20 ENCOUNTER — OFFICE VISIT (OUTPATIENT)
Dept: FAMILY MEDICINE | Facility: CLINIC | Age: 58
End: 2018-12-20
Payer: COMMERCIAL

## 2018-12-20 DIAGNOSIS — T88.1XXA LOCAL REACTION TO IMMUNIZATION, INITIAL ENCOUNTER: Primary | ICD-10-CM

## 2018-12-20 DIAGNOSIS — I10 HYPERTENSION GOAL BP (BLOOD PRESSURE) < 140/90: ICD-10-CM

## 2018-12-20 PROCEDURE — 99214 OFFICE O/P EST MOD 30 MIN: CPT | Performed by: FAMILY MEDICINE

## 2018-12-20 RX ORDER — LISINOPRIL 10 MG/1
10 TABLET ORAL DAILY
Qty: 30 TABLET | Refills: 0 | Status: SHIPPED | OUTPATIENT
Start: 2018-12-20 | End: 2019-01-09

## 2018-12-20 NOTE — PATIENT INSTRUCTIONS
Report to the Emergency Room if you have any breathing difficulties in the next 24 hours.      Continue Zyrtec 10 mg up to twice a day for itch.    Benadryl 25-50 mg every 6 hours for itch, watch for drowsiness        Start lisinopril 10 mg daily for blood pressure.  Return in 2 weeks to pharmacy to have blood pressure checked while on medication and bring your home cuff to have validated.

## 2018-12-20 NOTE — LETTER
12/20/18    To Whom It May Concern,  Kaleigh Tyson had a reaction to her allergy shot.  She will need to report to the Emergency Room if she has any breathing difficulties in the next 24 hours.    Thank you for your consideration in this matter.      Sincerely,        Lauren Saez MD

## 2018-12-20 NOTE — TELEPHONE ENCOUNTER
Patient is calling to inform pcp that today a co worker taped up her arm with ice pack  used boxing tape which was placed very tightly wondering if this is what caused her arm to swell up maybe they don't need to change her dosage for shots    Caller informed that calls received after 3pm may not be returned same day.    Please call to advice  Thank you

## 2018-12-20 NOTE — PROGRESS NOTES
SUBJECTIVE:   Kaleigh Tyson is a 58 year old female who presents to clinic today for the following health issues:      Possible allergic reaction after receiving allergy shots yesterday.  Noted this AM at work - increased swelling of upper left arm.  Pt has ice pack on it currently.     Reports one similar episode in past. Did not report it to her allergist.   No recent change in dose.    Patient denies: difficulty breathing, swallowing, swelling tongue, back of mouth, inability to speak, chest pain, faintness dizziness, change in vision, confusion, rapid progression of symptoms, speaking in short words, sudden onset of hoarseness, swelling in face/extremities, nausea vomiting, diarrhea, fever, rash, fatigue, headache, speaking in partial sentences.     Benadryl 1 tablet 25 mg @ 12:45 pm      Problem list and histories reviewed & adjusted, as indicated.  Additional history: as documented    Patient Active Problem List   Diagnosis     CARDIOVASCULAR SCREENING; LDL GOAL LESS THAN 160     Anaphylaxis, subsequent encounter     Elevated blood pressure reading without diagnosis of hypertension     Right knee pain, unspecified chronicity     Past Surgical History:   Procedure Laterality Date     NO HISTORY OF SURGERY         Social History     Tobacco Use     Smoking status: Current Every Day Smoker     Packs/day: 0.40     Types: Cigarettes     Last attempt to quit: 2008     Years since quittin.0     Smokeless tobacco: Never Used   Substance Use Topics     Alcohol use: Yes     Comment: rare     Family History   Problem Relation Age of Onset     Heart Disease Father      Dementia Father      Neurologic Disorder Brother      Lipids Sister      Lipids Sister      Myocardial Infarction Mother      Hypertension Mother          Current Outpatient Medications   Medication Sig Dispense Refill     lisinopril (PRINIVIL/ZESTRIL) 10 MG tablet Take 1 tablet (10 mg) by mouth daily 30 tablet 0     ORDER FOR ALLERGEN  IMMUNOTHERAPY Wasp Venom 100 mcg/mL HS 12 ml  Diluent: HSA qs to 5ml 12 mL prn     ORDER FOR ALLERGEN IMMUNOTHERAPY Mixed Vespid Venom 300 mcg/mL HS 12 ml  Diluent: HSA qs to 5ml 12 mL prn     EPINEPHrine (EPIPEN/ADRENACLICK/OR ANY BX GENERIC EQUIV) 0.3 MG/0.3ML injection 2-pack Inject 0.3 mLs (0.3 mg) into the muscle as needed for anaphylaxis (Patient not taking: Reported on 12/20/2018) 0.6 mL 11     BP Readings from Last 3 Encounters:   12/31/18 128/70   11/15/18 158/87   10/25/18 140/80    Wt Readings from Last 3 Encounters:   11/15/18 68 kg (150 lb)   10/25/18 67.1 kg (148 lb)   08/07/18 65.8 kg (145 lb)                  Labs reviewed in EPIC    Reviewed and updated as needed this visit by clinical staff  Tobacco  Allergies  Meds  Problems  Med Hx  Surg Hx  Fam Hx       Reviewed and updated as needed this visit by Provider  Tobacco  Allergies  Meds  Problems  Med Hx  Surg Hx  Fam Hx         ROS:  Constitutional, HEENT, cardiovascular, pulmonary, gi and gu systems are negative, except as otherwise noted.    OBJECTIVE:     There were no vitals taken for this visit. BP elevated at 155/94  There is no height or weight on file to calculate BMI.  GENERAL: healthy, alert and no distress  EYES: Eyes grossly normal to inspection, PERRL and conjunctivae and sclerae normal  RESP: lungs clear to auscultation - no rales, rhonchi or wheezes  CV: regular rate and rhythm, normal S1 S2, no S3 or S4, no murmur, click or rub, no peripheral edema and peripheral pulses strong  MS: no gross musculoskeletal defects noted, no edema, upper left arm - appears normal, pt identifies mild swelling of inner upper arm, no erythema, difficult to note difference between unaffected arm and symptomatic arm.  SKIN: no suspicious lesions or rashes  PSYCH: mentation appears normal and anxious    Diagnostic Test Results:  none     ASSESSMENT/PLAN:     (T88.1XXA) Local reaction to immunization, initial encounter  (primary encounter  diagnosis)  Comment: to allergy shot  Plan: minimal local reaction, continue benadryl and zyrtec as needed for symptom control  Pt must inform allergy clinic of both past reactions    (I10) Hypertension goal BP (blood pressure) < 140/90  Comment: elevated on several occasions  Plan: lisinopril (PRINIVIL/ZESTRIL) 10 MG tablet        Start lisinopril and f/u for bp check in 2 weeks.      Patient Instructions       Report to the Emergency Room if you have any breathing difficulties in the next 24 hours.      Continue Zyrtec 10 mg up to twice a day for itch.    Benadryl 25-50 mg every 6 hours for itch, watch for drowsiness        Start lisinopril 10 mg daily for blood pressure.  Return in 2 weeks to pharmacy to have blood pressure checked while on medication and bring your home cuff to have validated.            Lauren Saez MD  [facility]

## 2018-12-20 NOTE — TELEPHONE ENCOUNTER
Pt forgot to tell provider that when her arm started itching this morning she put an ice pack on it and a coworker used a heavy tape wrapped around her arm to hold it in place.  This was on for about 2 hours. When she took it off her arm was a lot worse and that was when she came in.  Pt asking if this could have caused her arm to get worse instead of the shots?  Mouna VEGAN, RN

## 2018-12-24 ENCOUNTER — TELEPHONE (OUTPATIENT)
Dept: ALLERGY | Facility: CLINIC | Age: 58
End: 2018-12-24

## 2018-12-24 NOTE — TELEPHONE ENCOUNTER
RN spoke with patient regarding injections. Dosing does not need to be changed. Irritation was due to using heavy duty packing tape on her arm. Explained that itching is normal and expected.     Tammi Do RN

## 2018-12-24 NOTE — TELEPHONE ENCOUNTER
Patient/parent is informed of MD note below, as it is written. Verbalized good understanding.  Lidia Olivares RN

## 2018-12-24 NOTE — TELEPHONE ENCOUNTER
Patient was at work after her allergy shot. It was itching so a coworker used a heavy duty tape to keep an ice pack on the injection site. After 3 hrs or so she tore the tape off her arm it made things more irritated. Her arm is fine now. She has taken some Benadryl.. Does her shot dose need to be adjusted? Please advise. Ok to leave a message.

## 2018-12-26 ENCOUNTER — ALLIED HEALTH/NURSE VISIT (OUTPATIENT)
Dept: ALLERGY | Facility: CLINIC | Age: 58
End: 2018-12-26
Payer: COMMERCIAL

## 2018-12-26 DIAGNOSIS — T63.441D TOXIC EFFECT OF VENOM OF BEES, UNINTENTIONAL, SUBSEQUENT ENCOUNTER: Primary | ICD-10-CM

## 2018-12-26 PROCEDURE — 99207 ZZC DROP WITH A PROCEDURE: CPT

## 2018-12-26 PROCEDURE — 95117 IMMUNOTHERAPY INJECTIONS: CPT

## 2018-12-31 ENCOUNTER — ALLIED HEALTH/NURSE VISIT (OUTPATIENT)
Dept: ALLERGY | Facility: CLINIC | Age: 58
End: 2018-12-31
Payer: COMMERCIAL

## 2018-12-31 ENCOUNTER — ALLIED HEALTH/NURSE VISIT (OUTPATIENT)
Dept: FAMILY MEDICINE | Facility: CLINIC | Age: 58
End: 2018-12-31

## 2018-12-31 VITALS — HEART RATE: 58 BPM | DIASTOLIC BLOOD PRESSURE: 70 MMHG | SYSTOLIC BLOOD PRESSURE: 128 MMHG

## 2018-12-31 DIAGNOSIS — T63.441D TOXIC EFFECT OF VENOM OF BEES, UNINTENTIONAL, SUBSEQUENT ENCOUNTER: Primary | ICD-10-CM

## 2018-12-31 DIAGNOSIS — Z01.30 BLOOD PRESSURE CHECK: Primary | ICD-10-CM

## 2018-12-31 PROCEDURE — 95117 IMMUNOTHERAPY INJECTIONS: CPT

## 2018-12-31 PROCEDURE — 99207 ZZC NO CHARGE NURSE ONLY: CPT | Performed by: FAMILY MEDICINE

## 2018-12-31 NOTE — PROGRESS NOTES
Kaleigh Tyson is enrolled/participating in the retail pharmacy Blood Pressure Goals Achievement Program (BPGAP).  Kaleigh Tyson was evaluated at [facility] on December 31, 2018 at which time her blood pressure was:    BP Readings from Last 3 Encounters:   12/31/18 128/70   11/15/18 158/87   10/25/18 140/80     Reviewed lifestyle modifications for blood pressure control and reduction: including making healthy food choices, managing weight, getting regular exercise, smoking cessation, reducing alcohol consumption, monitoring blood pressure regularly.     Kaleigh Tyson is not experiencing symptoms.    Follow-Up: BP is at goal of < 140/90mmHg (patient 18+ years of age with or without diabetes).  Recommended follow-up at pharmacy in 6 months.     Recommendation to Provider: None    Kaleigh Tyson was evaluated for enrollment into the PGEN study today.    PLEASE INITIATE ENROLLMENT DISCUSSION WITH HTN PTS  1) Between 30-80 years old                                                                                                               2) BMI between 19-50                                                                                                        3) BP ?140/90 AND ?170/110 patients aged 30-59         BP ?150/90 AND ?170/110 non-diabetic patients aged 60-80       BP ?140/90 AND ?170/110 diabetic patients aged 60-80  4) Additional requirements for uncontrolled HTN patients:        Pt on only 1 class of medication  5) EXCLUDE patient if confirmation of:                  ? Cardiac disease                  ? Chronic Kidney Disease                  ? Pregnancy/Breastfeeding                  ? Secondary Hypertension/Pre-eclampsia                                 ? Vascular disease    Patient eligible for enrollment:  Unknown  Patient interested in enrollment:  Unknown    This note completed by: Jose Parikh RP.  [facility]  [phone]

## 2019-01-07 ENCOUNTER — ALLIED HEALTH/NURSE VISIT (OUTPATIENT)
Dept: ALLERGY | Facility: CLINIC | Age: 59
End: 2019-01-07
Payer: COMMERCIAL

## 2019-01-07 DIAGNOSIS — T63.441D TOXIC EFFECT OF VENOM OF BEES, UNINTENTIONAL, SUBSEQUENT ENCOUNTER: Primary | ICD-10-CM

## 2019-01-07 PROCEDURE — 99207 ZZC DROP WITH A PROCEDURE: CPT

## 2019-01-07 PROCEDURE — 95117 IMMUNOTHERAPY INJECTIONS: CPT

## 2019-01-07 NOTE — PROGRESS NOTES
Patient presented after waiting 30 minutes with no reaction to allergy injections. Discharged from clinic.    Katy Dobbs RN

## 2019-01-08 DIAGNOSIS — I10 HYPERTENSION GOAL BP (BLOOD PRESSURE) < 140/90: ICD-10-CM

## 2019-01-08 NOTE — TELEPHONE ENCOUNTER
Routing refill request to provider for review/approval because:  Labs not current:            BP Readings from Last 3 Encounters:   12/31/18 128/70   11/15/18 158/87   10/25/18 140/80       Diann VEGAN, RN, CPN

## 2019-01-09 RX ORDER — LISINOPRIL 10 MG/1
10 TABLET ORAL DAILY
Qty: 30 TABLET | Refills: 0 | Status: SHIPPED | OUTPATIENT
Start: 2019-01-09 | End: 2019-02-05

## 2019-01-15 ENCOUNTER — DOCUMENTATION ONLY (OUTPATIENT)
Dept: FAMILY MEDICINE | Facility: CLINIC | Age: 59
End: 2019-01-15

## 2019-01-15 DIAGNOSIS — Z13.6 CARDIOVASCULAR SCREENING; LDL GOAL LESS THAN 160: ICD-10-CM

## 2019-01-15 DIAGNOSIS — R03.0 ELEVATED BLOOD PRESSURE READING WITHOUT DIAGNOSIS OF HYPERTENSION: Primary | ICD-10-CM

## 2019-01-15 NOTE — PROGRESS NOTES
.Please place or confirm pending lab orders for upcoming lab appointment on 1/21/19  Thank you,  Marleny

## 2019-01-15 NOTE — PROGRESS NOTES
Please review and sign Pending  Labs in Epic. Told needed Non Fasting Labs for refill   Cristiana BLOUNT

## 2019-01-21 ENCOUNTER — ALLIED HEALTH/NURSE VISIT (OUTPATIENT)
Dept: ALLERGY | Facility: CLINIC | Age: 59
End: 2019-01-21
Payer: COMMERCIAL

## 2019-01-21 DIAGNOSIS — T63.441D TOXIC EFFECT OF VENOM OF BEES, UNINTENTIONAL, SUBSEQUENT ENCOUNTER: Primary | ICD-10-CM

## 2019-01-21 DIAGNOSIS — Z13.6 CARDIOVASCULAR SCREENING; LDL GOAL LESS THAN 160: ICD-10-CM

## 2019-01-21 DIAGNOSIS — R03.0 ELEVATED BLOOD PRESSURE READING WITHOUT DIAGNOSIS OF HYPERTENSION: ICD-10-CM

## 2019-01-21 LAB
ANION GAP SERPL CALCULATED.3IONS-SCNC: 5 MMOL/L (ref 3–14)
BUN SERPL-MCNC: 9 MG/DL (ref 7–30)
CALCIUM SERPL-MCNC: 9.1 MG/DL (ref 8.5–10.1)
CHLORIDE SERPL-SCNC: 105 MMOL/L (ref 94–109)
CO2 SERPL-SCNC: 29 MMOL/L (ref 20–32)
CREAT SERPL-MCNC: 0.78 MG/DL (ref 0.52–1.04)
GFR SERPL CREATININE-BSD FRML MDRD: 85 ML/MIN/{1.73_M2}
GLUCOSE SERPL-MCNC: 95 MG/DL (ref 70–99)
POTASSIUM SERPL-SCNC: 3.8 MMOL/L (ref 3.4–5.3)
SODIUM SERPL-SCNC: 139 MMOL/L (ref 133–144)

## 2019-01-21 PROCEDURE — 95117 IMMUNOTHERAPY INJECTIONS: CPT

## 2019-01-21 PROCEDURE — 80048 BASIC METABOLIC PNL TOTAL CA: CPT | Performed by: FAMILY MEDICINE

## 2019-01-21 PROCEDURE — 99207 ZZC DROP WITH A PROCEDURE: CPT

## 2019-01-21 PROCEDURE — 36415 COLL VENOUS BLD VENIPUNCTURE: CPT | Performed by: FAMILY MEDICINE

## 2019-01-21 NOTE — LETTER
January 22, 2019    Kaleigh Tyson  [address] [facility] [address]            Dear Kaleigh,    The results of your recent tests were normal.  Below is a copy of the results.  It was a pleasure to see you at your last appointment.    If you have any questions or concerns, please call myself or my nurse at [phone].    Sincerely,    Lauren Saez MD / alesha    Results for orders placed or performed in visit on 01/21/19   **Basic metabolic panel FUTURE anytime   Result Value Ref Range    Sodium 139 133 - 144 mmol/L    Potassium 3.8 3.4 - 5.3 mmol/L    Chloride 105 94 - 109 mmol/L    Carbon Dioxide 29 20 - 32 mmol/L    Anion Gap 5 3 - 14 mmol/L    Glucose 95 70 - 99 mg/dL    Urea Nitrogen 9 7 - 30 mg/dL    Creatinine 0.78 0.52 - 1.04 mg/dL    GFR Estimate 85 >60 mL/min/[1.73_m2]    GFR Estimate If Black >90 >60 mL/min/[1.73_m2]    Calcium 9.1 8.5 - 10.1 mg/dL

## 2019-02-05 ENCOUNTER — TELEPHONE (OUTPATIENT)
Dept: FAMILY MEDICINE | Facility: CLINIC | Age: 59
End: 2019-02-05

## 2019-02-05 DIAGNOSIS — I10 HYPERTENSION GOAL BP (BLOOD PRESSURE) < 140/90: ICD-10-CM

## 2019-02-05 RX ORDER — LISINOPRIL 10 MG/1
10 TABLET ORAL DAILY
Qty: 30 TABLET | Refills: 0 | Status: SHIPPED | OUTPATIENT
Start: 2019-02-05 | End: 2019-03-21

## 2019-02-05 NOTE — TELEPHONE ENCOUNTER
Patient is due for physical in March.    Had labs bmp done 1/21/19.  Lipids not done.  Please advise  On refill.  Lidia Olivares RN

## 2019-02-05 NOTE — TELEPHONE ENCOUNTER
Patient is due for a physical in March.  Verbal Orders received  By Dr Lauren Saez, # 30 refill and will prescribe a larger quantity at the time of her physical.  Verbalized good understanding.     Per protocol, will route encounter to be cosigned by provider for Verbal Orders.  Lidia Olivares RN

## 2019-02-08 ENCOUNTER — ALLIED HEALTH/NURSE VISIT (OUTPATIENT)
Dept: ALLERGY | Facility: CLINIC | Age: 59
End: 2019-02-08
Payer: COMMERCIAL

## 2019-02-08 DIAGNOSIS — T63.441D TOXIC EFFECT OF VENOM OF BEES, UNINTENTIONAL, SUBSEQUENT ENCOUNTER: Primary | ICD-10-CM

## 2019-02-08 PROCEDURE — 95117 IMMUNOTHERAPY INJECTIONS: CPT

## 2019-02-08 PROCEDURE — 99207 ZZC DROP WITH A PROCEDURE: CPT

## 2019-02-19 ENCOUNTER — TELEPHONE (OUTPATIENT)
Dept: ALLERGY | Facility: OTHER | Age: 59
End: 2019-02-19

## 2019-02-19 NOTE — TELEPHONE ENCOUNTER
LM informing patient that she should space injections 24 hours from allergy shots, but that they would not interact. Instructed her to call [facility] @ [phone] if she has anymore questions.    Tammi Do RN

## 2019-02-19 NOTE — TELEPHONE ENCOUNTER
Patient came into clinic today stating she is required a HEP B vaccine and a Mantoux for work. She would like to know if the Bee Venom Therapy can have an interaction with the vaccine and skin test. Please call patient to discuss.   Thank you    Yessica Marroquin on 2/19/2019 at 5:14 PM

## 2019-02-20 ENCOUNTER — ALLIED HEALTH/NURSE VISIT (OUTPATIENT)
Dept: ALLERGY | Facility: CLINIC | Age: 59
End: 2019-02-20
Payer: COMMERCIAL

## 2019-02-20 DIAGNOSIS — T63.441D TOXIC EFFECT OF VENOM OF BEES, UNINTENTIONAL, SUBSEQUENT ENCOUNTER: Primary | ICD-10-CM

## 2019-02-20 PROCEDURE — 95117 IMMUNOTHERAPY INJECTIONS: CPT

## 2019-02-20 PROCEDURE — 99207 ZZC DROP WITH A PROCEDURE: CPT

## 2019-02-22 ENCOUNTER — ALLIED HEALTH/NURSE VISIT (OUTPATIENT)
Dept: NURSING | Facility: CLINIC | Age: 59
End: 2019-02-22
Payer: COMMERCIAL

## 2019-02-22 DIAGNOSIS — Z23 NEED FOR VACCINATION: Primary | ICD-10-CM

## 2019-02-22 PROCEDURE — 86580 TB INTRADERMAL TEST: CPT

## 2019-02-22 PROCEDURE — 90471 IMMUNIZATION ADMIN: CPT

## 2019-02-22 PROCEDURE — 90746 HEPB VACCINE 3 DOSE ADULT IM: CPT

## 2019-02-22 NOTE — PROGRESS NOTES
Prior to injection, verified patient identity using patient's name and date of birth.  Due to injection administration, patient instructed to remain in clinic for 15 minutes  afterwards, and to report any adverse reaction to me immediately.    Hep B    Drug Amount Wasted:  None.  Vial/Syringe: Syringe  Expiration Date:  9/4/2020    Screening Questionnaire for Adult Immunization    Are you sick today?   No   Do you have allergies to medications, food, a vaccine component or latex?   No   Have you ever had a serious reaction after receiving a vaccination?   No   Do you have a long-term health problem with heart disease, lung disease, asthma, kidney disease, metabolic disease (e.g. diabetes), anemia, or other blood disorder?   No   Do you have cancer, leukemia, HIV/AIDS, or any other immune system problem?   No   In the past 3 months, have you taken medications that affect  your immune system, such as prednisone, other steroids, or anticancer drugs; drugs for the treatment of rheumatoid arthritis, Crohn s disease, or psoriasis; or have you had radiation treatments?   No   Have you had a seizure, or a brain or other nervous system problem?   No   During the past year, have you received a transfusion of blood or blood     products, or been given immune (gamma) globulin or antiviral drug?   No   For women: Are you pregnant or is there a chance you could become        pregnant during the next month?   No   Have you received any vaccinations in the past 4 weeks?   No     Immunization questionnaire answers were all negative.        Per orders of Dr. Saez, injection of Hep B given by Justine Jorge. Patient instructed to remain in clinic for 15 minutes afterwards, and to report any adverse reaction to me immediately.       Screening performed by Justine Jorge on 2/22/2019 at 10:27 AM.    The patient is asked the following questions today and these are her answers:    -Have you had a mantoux administered in the  past 30 days?    No  -Have you had a previous positive Mantoux.  No  -Have you received BCG in the past.  No  -Have you had a live vaccine  (MMR, Varicella, OPV, Yellow Fever) in the last 6 weeks.  No  -Have you had and active  viral or bacterial infection in the past 6 weeks.  No  -Have you received corticosteroids or immunosuppressive agents in the past 6 weeks.  No  -Have you been diagnosed with HIV?  No  -Do you have a maglinancy?  No   Justine Jorge MA

## 2019-03-12 DIAGNOSIS — Z13.6 CARDIOVASCULAR SCREENING; LDL GOAL LESS THAN 160: ICD-10-CM

## 2019-03-12 LAB
CHOLEST SERPL-MCNC: 254 MG/DL
HDLC SERPL-MCNC: 38 MG/DL
LDLC SERPL CALC-MCNC: 179 MG/DL
NONHDLC SERPL-MCNC: 216 MG/DL
TRIGL SERPL-MCNC: 187 MG/DL

## 2019-03-12 PROCEDURE — 36415 COLL VENOUS BLD VENIPUNCTURE: CPT | Performed by: FAMILY MEDICINE

## 2019-03-12 PROCEDURE — 80061 LIPID PANEL: CPT | Performed by: FAMILY MEDICINE

## 2019-03-15 NOTE — PROGRESS NOTES
"   SUBJECTIVE:   CC: Kaleigh Tyson is an 58 year old woman who presents for preventive health visit.     Healthy Habits:    Do you get at least three servings of calcium containing foods daily (dairy, green leafy vegetables, etc.)? { :206580::\"yes\"}    Amount of exercise or daily activities, outside of work: { :923695}    Problems taking medications regularly { :710772::\"No\"}    Medication side effects: { :359556::\"No\"}    Have you had an eye exam in the past two years? { :949119}    Do you see a dentist twice per year? { :181206}    Do you have sleep apnea, excessive snoring or daytime drowsiness?{ :059015}  {Outside tests to abstract? :869310}    {additional problems to add (Optional):649863}    Today's PHQ-2 Score:   PHQ-2 (  [employer]) 2018 3/19/2018   Q1: Little interest or pleasure in doing things 0 0   Q2: Feeling down, depressed or hopeless 0 0   PHQ-2 Score 0 0   Q1: Little interest or pleasure in doing things - Not at all   Q2: Feeling down, depressed or hopeless - Not at all   PHQ-2 Score - 0     {PHQ-2 LOOK IN ASSESSMENTS (Optional) :484592}  Abuse: Current or Past(Physical, Sexual or Emotional)- {YES/NO/NA:032492}  Do you feel safe in your environment? {YES/NO/NA:052709}    Social History     Tobacco Use     Smoking status: Current Every Day Smoker     Packs/day: 0.40     Types: Cigarettes     Last attempt to quit: 2008     Years since quittin.2     Smokeless tobacco: Never Used   Substance Use Topics     Alcohol use: Yes     Comment: rare     If you drink alcohol do you typically have >3 drinks per day or >7 drinks per week? {ETOH :420764}                     Reviewed orders with patient.  Reviewed health maintenance and updated orders accordingly - {Yes/No:884402::\"Yes\"}  {Chronicprobdata (Optional):593652}    {Mammo Decision Support (Optional):604111}    Pertinent mammograms are reviewed under the imaging tab.  History of abnormal Pap smear: {PAP HX:804358}  PAP / HPV 2016   PAP " "NIL     Reviewed and updated as needed this visit by clinical staff         Reviewed and updated as needed this visit by Provider        {HISTORY OPTIONS (Optional):120633}    ROS:  { :668401}    OBJECTIVE:   There were no vitals taken for this visit.  EXAM:  {Exam Choices:763119}    {Diagnostic Test Results (Optional):711927::\"Diagnostic Test Results:\",\"none \"}    ASSESSMENT/PLAN:   {Diag Picklist:495078}    COUNSELING:   {FEMALE COUNSELING MESSAGES:563741::\"Reviewed preventive health counseling, as reflected in patient instructions\"}    BP Readings from Last 1 Encounters:   12/31/18 128/70     Estimated body mass index is 25.55 kg/m  as calculated from the following:    Height as of 3/19/18: 1.632 m (5' 4.25\").    Weight as of 11/15/18: 68 kg (150 lb).    {BP Counseling- Complete if BP >= 120/80  (Optional):628874}  {Weight Management Plan (ACO) Complete if BMI is abnormal-  Ages 18-64  BMI >24.9.  Age 65+ with BMI <23 or >30 (Optional):999324}     reports that she has been smoking cigarettes.  She has been smoking about 0.40 packs per day. she has never used smokeless tobacco.  {Tobacco Cessation -- Complete if patient is a smoker (Optional):279396}    Counseling Resources:  ATP IV Guidelines  Pooled Cohorts Equation Calculator  Breast Cancer Risk Calculator  FRAX Risk Assessment  ICSI Preventive Guidelines  Dietary Guidelines for Americans, 2010  USDA's MyPlate  ASA Prophylaxis  Lung CA Screening    Lauren Saez MD  [facility]  "

## 2019-03-21 ENCOUNTER — OFFICE VISIT (OUTPATIENT)
Dept: FAMILY MEDICINE | Facility: CLINIC | Age: 59
End: 2019-03-21
Payer: COMMERCIAL

## 2019-03-21 ENCOUNTER — TELEPHONE (OUTPATIENT)
Dept: FAMILY MEDICINE | Facility: CLINIC | Age: 59
End: 2019-03-21

## 2019-03-21 VITALS
WEIGHT: 150.2 LBS | HEART RATE: 67 BPM | SYSTOLIC BLOOD PRESSURE: 127 MMHG | TEMPERATURE: 97.9 F | HEIGHT: 64 IN | OXYGEN SATURATION: 97 % | BODY MASS INDEX: 25.64 KG/M2 | DIASTOLIC BLOOD PRESSURE: 79 MMHG

## 2019-03-21 DIAGNOSIS — I10 HYPERTENSION GOAL BP (BLOOD PRESSURE) < 140/90: ICD-10-CM

## 2019-03-21 DIAGNOSIS — Z12.11 SCREEN FOR COLON CANCER: Primary | ICD-10-CM

## 2019-03-21 DIAGNOSIS — Z00.00 ROUTINE GENERAL MEDICAL EXAMINATION AT A HEALTH CARE FACILITY: Primary | ICD-10-CM

## 2019-03-21 PROCEDURE — 87624 HPV HI-RISK TYP POOLED RSLT: CPT | Performed by: FAMILY MEDICINE

## 2019-03-21 PROCEDURE — 99396 PREV VISIT EST AGE 40-64: CPT | Performed by: FAMILY MEDICINE

## 2019-03-21 PROCEDURE — 99212 OFFICE O/P EST SF 10 MIN: CPT | Mod: 25 | Performed by: FAMILY MEDICINE

## 2019-03-21 PROCEDURE — G0145 SCR C/V CYTO,THINLAYER,RESCR: HCPCS | Performed by: FAMILY MEDICINE

## 2019-03-21 RX ORDER — LISINOPRIL 10 MG/1
10 TABLET ORAL DAILY
Qty: 90 TABLET | Refills: 1 | Status: SHIPPED | OUTPATIENT
Start: 2019-03-21 | End: 2019-09-12

## 2019-03-21 ASSESSMENT — ENCOUNTER SYMPTOMS
NERVOUS/ANXIOUS: 0
HEMATOCHEZIA: 0
ABDOMINAL PAIN: 0
COUGH: 0
EYE PAIN: 0
HEMATURIA: 0
FREQUENCY: 0
DIARRHEA: 0
HEADACHES: 0
HEARTBURN: 0
CONSTIPATION: 0
ARTHRALGIAS: 0
FEVER: 0
DIZZINESS: 0
CHILLS: 0

## 2019-03-21 ASSESSMENT — MIFFLIN-ST. JEOR: SCORE: 1260.27

## 2019-03-21 NOTE — PROGRESS NOTES
SUBJECTIVE:   CC: Kaleigh Tyson is an 58 year old woman who presents for preventive health visit.     Physical   Annual:     Getting at least 3 servings of Calcium per day:  Yes    Bi-annual eye exam:  Yes    Dental care twice a year:  Yes    Sleep apnea or symptoms of sleep apnea:  None    Diet:  Regular (no restrictions)    Frequency of exercise:  4-5 days/week    Duration of exercise:  45-60 minutes    Taking medications regularly:  Yes    Additional concerns today:  No    PHQ-2 Total Score: 0              Today's PHQ-2 Score:   PHQ-2 (  [employer]) 3/21/2019   Q1: Little interest or pleasure in doing things 0   Q2: Feeling down, depressed or hopeless 0   PHQ-2 Score 0   Q1: Little interest or pleasure in doing things Not at all   Q2: Feeling down, depressed or hopeless Not at all   PHQ-2 Score 0       Abuse: Current or Past(Physical, Sexual or Emotional)- No  Do you feel safe in your environment? No    Social History     Tobacco Use     Smoking status: Current Every Day Smoker     Packs/day: 0.40     Types: Cigarettes     Last attempt to quit: 2008     Years since quittin.2     Smokeless tobacco: Never Used   Substance Use Topics     Alcohol use: Yes     Comment: rare     Alcohol Use 3/21/2019   If you drink alcohol do you typically have greater than 3 drinks per day OR greater than 7 drinks per week? No       Reviewed orders with patient.  Reviewed health maintenance and updated orders accordingly - Yes  G 1 P 1   No LMP recorded. Patient is postmenopausal.     Fasting: No   Td: tdap        Last 3 Pap and HPV Results:   PAP / HPV 2016   PAP NIL                  Cholesterol:   Lab Results   Component Value Date    CHOL 254 2019     Lab Results   Component Value Date    HDL 38 2019     Lab Results   Component Value Date     2019     Lab Results   Component Value Date    TRIG 187 2019     Lab Results   Component Value Date    CHOLHDLRATIO 6.1 2009     The  10-year ASCVD risk score (Flor WALTER Jr., et al., 2013) is: 11.8%    Values used to calculate the score:      Age: 58 years      Sex: Female      Is Non- : No      Diabetic: No      Tobacco smoker: Yes      Systolic Blood Pressure: 127 mmHg      Is BP treated: Yes      HDL Cholesterol: 38 mg/dL      Total Cholesterol: 254 mg/dL     MM/18  Dexa:  NA     Flex/colo: FIT       Seat Belt: Yes    Sunscreen use: No  Calcium Intake: adeq   Health Care Directive: No  Sexually Active: Yes     Current contraception: none  History of abnormal Pap smear: No  Family history of colon/breast/ovarian cancer: No  Regular self breast exam: Yes  History of abnormal mammogram: No    Labs reviewed in EPIC  BP Readings from Last 3 Encounters:   19 127/79   18 128/70   11/15/18 158/87    Wt Readings from Last 3 Encounters:   19 68.1 kg (150 lb 3.2 oz)   11/15/18 68 kg (150 lb)   10/25/18 67.1 kg (148 lb)                  Patient Active Problem List   Diagnosis     CARDIOVASCULAR SCREENING; LDL GOAL LESS THAN 160     Anaphylaxis, subsequent encounter     Elevated blood pressure reading without diagnosis of hypertension     Right knee pain, unspecified chronicity     Past Surgical History:   Procedure Laterality Date     NO HISTORY OF SURGERY         Social History     Tobacco Use     Smoking status: Current Every Day Smoker     Packs/day: 0.40     Types: Cigarettes     Last attempt to quit: 2008     Years since quittin.2     Smokeless tobacco: Never Used   Substance Use Topics     Alcohol use: Yes     Comment: rare     Family History   Problem Relation Age of Onset     Heart Disease Father      Dementia Father      Neurologic Disorder Brother      Lipids Sister      Lipids Sister      Myocardial Infarction Mother      Hypertension Mother          Current Outpatient Medications   Medication Sig Dispense Refill     lisinopril (PRINIVIL/ZESTRIL) 10 MG tablet Take 1 tablet (10 mg) by mouth  "daily 90 tablet 1     ORDER FOR ALLERGEN IMMUNOTHERAPY Wasp Venom 100 mcg/mL HS 12 ml  Diluent: HSA qs to 5ml 12 mL prn     ORDER FOR ALLERGEN IMMUNOTHERAPY Mixed Vespid Venom 300 mcg/mL HS 12 ml  Diluent: HSA qs to 5ml 12 mL prn     EPINEPHrine (EPIPEN/ADRENACLICK/OR ANY BX GENERIC EQUIV) 0.3 MG/0.3ML injection 2-pack Inject 0.3 mLs (0.3 mg) into the muscle as needed for anaphylaxis (Patient not taking: Reported on 12/20/2018) 0.6 mL 11       Mammogram Screening: Patient over age 50, mutual decision to screen reflected in health maintenance.    Pertinent mammograms are reviewed under the imaging tab.  Reviewed and updated as needed this visit by clinical staff  Tobacco  Allergies  Meds  Problems  Med Hx  Surg Hx  Fam Hx  Soc Hx          Reviewed and updated as needed this visit by Provider  Tobacco  Allergies  Meds  Problems  Med Hx  Surg Hx  Fam Hx            Review of Systems   Constitutional: Negative for chills and fever.   HENT: Negative for congestion, ear pain and hearing loss.    Eyes: Negative for pain.   Respiratory: Negative for cough.    Cardiovascular: Negative for chest pain.   Gastrointestinal: Negative for abdominal pain, constipation, diarrhea, heartburn and hematochezia.   Genitourinary: Negative for frequency, genital sores and hematuria.   Musculoskeletal: Negative for arthralgias.   Neurological: Negative for dizziness and headaches.   Psychiatric/Behavioral: The patient is not nervous/anxious.           OBJECTIVE:   /79   Pulse 67   Temp 97.9  F (36.6  C) (Oral)   Ht 1.632 m (5' 4.25\")   Wt 68.1 kg (150 lb 3.2 oz)   SpO2 97%   BMI 25.58 kg/m    Physical Exam  GENERAL APPEARANCE: healthy, alert and no distress  EYES: Eyes grossly normal to inspection, PERRL and conjunctivae and sclerae normal  HENT: ear canals and TM's normal, nose and mouth without ulcers or lesions, oropharynx clear and oral mucous membranes moist  NECK: no adenopathy, no asymmetry, masses, or scars " "and thyroid normal to palpation  RESP: lungs clear to auscultation - no rales, rhonchi or wheezes  BREAST: normal without masses, tenderness or nipple discharge and no palpable axillary masses or adenopathy  CV: regular rate and rhythm, normal S1 S2, no S3 or S4, no murmur, click or rub, no peripheral edema and peripheral pulses strong  ABDOMEN: soft, nontender, no hepatosplenomegaly, no masses and bowel sounds normal   (female): normal female external genitalia, normal urethral meatus, vaginal mucosal atrophy noted, normal cervix, adnexae, and uterus without masses or abnormal discharge  MS: no musculoskeletal defects are noted and gait is age appropriate without ataxia  SKIN: no suspicious lesions or rashes  NEURO: Normal strength and tone, sensory exam grossly normal, mentation intact and speech normal  PSYCH: mentation appears normal and affect normal/bright    Diagnostic Test Results:  none     ASSESSMENT/PLAN:   (Z00.00) Routine general medical examination at a health care facility  (primary encounter diagnosis)  Comment: preventive needs reviewed  Plan: HPV High Risk Types DNA Cervical, Pap imaged         thin layer screen with HPV - recommended age 30        - 65 years (select HPV order below)        see orders in Epic.     (I10) Hypertension goal BP (blood pressure) < 140/90  Comment: to goal, toleratingwell  Plan: lisinopril (PRINIVIL/ZESTRIL) 10 MG tablet         Refill x 6 months   f/u 6 months for anc/pharm bp check and labs   Recheck lipids at that time      COUNSELING:  Reviewed preventive health counseling, as reflected in patient instructions  Special attention given to:        Regular exercise       Healthy diet/nutrition    BP Readings from Last 1 Encounters:   03/21/19 127/79     Estimated body mass index is 25.58 kg/m  as calculated from the following:    Height as of this encounter: 1.632 m (5' 4.25\").    Weight as of this encounter: 68.1 kg (150 lb 3.2 oz).      Weight management plan: " Discussed healthy diet and exercise guidelines     reports that she has been smoking cigarettes.  She has been smoking about 0.40 packs per day. she has never used smokeless tobacco.  Tobacco Cessation Action Plan: Information offered: Patient not interested at this time    Counseling Resources:  ATP IV Guidelines  Pooled Cohorts Equation Calculator  Breast Cancer Risk Calculator  FRAX Risk Assessment  ICSI Preventive Guidelines  Dietary Guidelines for Americans, 2010  [employer]'s MyPlate  ASA Prophylaxis  Lung CA Screening    Lauren Saez MD  [facility]

## 2019-03-21 NOTE — TELEPHONE ENCOUNTER
Patient would like to  the fit test tomorrow at lab forgot to mention this to provider, would like an order for this please    Thank you

## 2019-03-21 NOTE — TELEPHONE ENCOUNTER
Fit test as last ordered on 4/16/18 was reordered as future order and pended for provider review.  Pt should confirm whether or not FIT test will be covered by insurance if completed less than 365 days from the date of last FIT test.    RN consulted Dr. Saez regarding pt's request above. Per Dr. Saez, okay to order FIT test each year when due for pt, or Cologuard test every 3 years when due.    RN returned call to pt and presented these 2 options. Pt states she will look into the Cologuard option more and will call back after 4/16/19 when she is due with her decision regarding which test she would like.    Camille Ureña RN

## 2019-03-21 NOTE — PROGRESS NOTES
G 1 P 1   No LMP recorded. Patient is postmenopausal.     Fasting: {YES:998331}   Td: tdap        Last 3 Pap and HPV Results:   PAP / HPV 2016   PAP NIL                  Cholesterol:   Lab Results   Component Value Date    CHOL 254 2019     Lab Results   Component Value Date    HDL 38 2019     Lab Results   Component Value Date     2019     Lab Results   Component Value Date    TRIG 187 2019     Lab Results   Component Value Date    CHOLHDLRATIO 6.1 2009         MM/18  Dexa:  NA     Flex/colo: FIT       Seat Belt: {YES:817530}   Sunscreen use: {YES:534006}  Calcium Intake: ***   Health Care Directive: {YES:045747}  Sexually Active: {YES:257685}    Current contraception: {contraception:706}  History of abnormal Pap smear: {abnl Pap:60}  Family history of colon/breast/ovarian cancer: No  Regular self breast exam: {SBE:63}  History of abnormal mammogram: No

## 2019-03-22 ENCOUNTER — ALLIED HEALTH/NURSE VISIT (OUTPATIENT)
Dept: ALLERGY | Facility: CLINIC | Age: 59
End: 2019-03-22
Payer: COMMERCIAL

## 2019-03-22 DIAGNOSIS — T63.441D TOXIC EFFECT OF VENOM OF BEES, UNINTENTIONAL, SUBSEQUENT ENCOUNTER: Primary | ICD-10-CM

## 2019-03-22 PROCEDURE — 95117 IMMUNOTHERAPY INJECTIONS: CPT

## 2019-03-22 PROCEDURE — 99207 ZZC DROP WITH A PROCEDURE: CPT

## 2019-03-22 NOTE — PROGRESS NOTES
Patient presented after waiting 30 minutes with normal reaction to allergy injections. Discharged from clinic.    Latha Ding RN ............   3/22/2019...10:36 AM

## 2019-03-25 LAB
COPATH REPORT: NORMAL
PAP: NORMAL

## 2019-03-26 PROBLEM — R87.810 CERVICAL HIGH RISK HPV (HUMAN PAPILLOMAVIRUS) TEST POSITIVE: Status: ACTIVE | Noted: 2019-03-21

## 2019-03-26 LAB
FINAL DIAGNOSIS: ABNORMAL
HPV HR 12 DNA CVX QL NAA+PROBE: POSITIVE
HPV16 DNA SPEC QL NAA+PROBE: NEGATIVE
HPV18 DNA SPEC QL NAA+PROBE: NEGATIVE
SPECIMEN DESCRIPTION: ABNORMAL
SPECIMEN SOURCE CVX/VAG CYTO: ABNORMAL

## 2019-04-12 ENCOUNTER — ALLIED HEALTH/NURSE VISIT (OUTPATIENT)
Dept: ALLERGY | Facility: CLINIC | Age: 59
End: 2019-04-12
Payer: COMMERCIAL

## 2019-04-12 DIAGNOSIS — T63.441D TOXIC EFFECT OF VENOM OF BEES, UNINTENTIONAL, SUBSEQUENT ENCOUNTER: Primary | ICD-10-CM

## 2019-04-12 DIAGNOSIS — T63.481D ANAPHYLAXIS DUE TO HYMENOPTERA VENOM, ACCIDENTAL OR UNINTENTIONAL, SUBSEQUENT ENCOUNTER: ICD-10-CM

## 2019-04-12 DIAGNOSIS — T78.2XXD ANAPHYLAXIS DUE TO HYMENOPTERA VENOM, ACCIDENTAL OR UNINTENTIONAL, SUBSEQUENT ENCOUNTER: ICD-10-CM

## 2019-04-12 PROCEDURE — 95117 IMMUNOTHERAPY INJECTIONS: CPT

## 2019-04-12 PROCEDURE — 99207 ZZC DROP WITH A PROCEDURE: CPT

## 2019-04-12 NOTE — PROGRESS NOTES
Patient presented after waiting 30 minutes with no reaction to venom allergy injections. Discharged from clinic.    Hussain Young RN....4/12/2019 10:16 AM

## 2019-04-12 NOTE — TELEPHONE ENCOUNTER
ALLERGY SOLUTION RE-ORDER REQUEST    Kaleigh Tyson 1960 MRN: [id number]    DATE NEEDED:  May 3, 2019  Vial Color Content   Top Dose       Last Dose     Vial Size  Red 1:1 Mixed Vespid   Red 1:1 1                                                   Red 1:11                                                     12ml  Red 1:1 Wasp   Red 1:1 1                                                   Red 1:11                                                     12ml          Serum reorder consent signed and patient/parent was advised that new serums would be ordered through the pharmacy and billed to their insurance company when they arrive in clinic. Yes    Shot Clinic Location:  [place]  Ship to Location: [place]  Serum billed to:  [place]    Special Instructions:  No       Updated Prescription Needed: No      Requester Signature  [facility]

## 2019-05-02 DIAGNOSIS — T63.441D TOXIC EFFECT OF VENOM OF BEES, UNINTENTIONAL, SUBSEQUENT ENCOUNTER: Primary | ICD-10-CM

## 2019-05-02 PROCEDURE — 95147 ANTIGEN THERAPY SERVICES: CPT | Mod: 59 | Performed by: ALLERGY & IMMUNOLOGY

## 2019-05-02 PROCEDURE — 95147 ANTIGEN THERAPY SERVICES: CPT | Performed by: ALLERGY & IMMUNOLOGY

## 2019-05-02 NOTE — PROGRESS NOTES
Allergy serums billed at [place].     Vials received below:    Vial Color Content                      Vial Size Expiration Date  Red 1:1 Wasp 13mL  04/30/2020  Red 1:1 Mixed Vespid 13mL  04/30/2020    Original Refill encounter date: 04/12/2019      Signature  Yael Anton [facility] ......5/2/2019...2:58 PM

## 2019-05-02 NOTE — TELEPHONE ENCOUNTER
Allergy serums received at [place].     Vials received below:    Vial Color Content                      Vial Size Expiration Date  Red 1:1 Wasp 13mL  04/30/2020  Red 1:1 Mixed Vespid 13mL  04/30/2020  {  Signature  Yael Anton, [facility] ......5/2/2019...2:55 PM

## 2019-05-03 DIAGNOSIS — T78.2XXD ANAPHYLAXIS DUE TO HYMENOPTERA VENOM, ACCIDENTAL OR UNINTENTIONAL, SUBSEQUENT ENCOUNTER: ICD-10-CM

## 2019-05-03 DIAGNOSIS — T63.481D ANAPHYLAXIS DUE TO HYMENOPTERA VENOM, ACCIDENTAL OR UNINTENTIONAL, SUBSEQUENT ENCOUNTER: ICD-10-CM

## 2019-05-03 NOTE — TELEPHONE ENCOUNTER
ALLERGY SOLUTION RE-ORDER REQUEST    Kaleigh Tyson 1960 MRN: [id number]    DATE NEEDED:  5/8/19  Vial Color Content   Top Dose       Last Dose     Vial Size  Red 1:1 Mixed Vespid   Red 1:1 1   Red 1:11 13mL      Serum reorder consent signed and patient/parent was advised that new serums would be ordered through the pharmacy and billed to their insurance company when they arrive in clinic. Yes    Shot Clinic Location:  [place]  Ship to Location: [place]  Serum billed to:  [place]    Special Instructions:  Vial shattered during shipment. Patient was already billed. Do not bill again for serum       Updated Prescription Needed: No      Requester Signature  Latha Ding RN

## 2019-05-09 ENCOUNTER — ALLIED HEALTH/NURSE VISIT (OUTPATIENT)
Dept: ALLERGY | Facility: CLINIC | Age: 59
End: 2019-05-09
Payer: COMMERCIAL

## 2019-05-09 DIAGNOSIS — T78.2XXD ANAPHYLACTIC REACTION TO BEE STING, ACCIDENTAL OR UNINTENTIONAL, SUBSEQUENT ENCOUNTER: ICD-10-CM

## 2019-05-09 DIAGNOSIS — T63.441D TOXIC EFFECT OF VENOM OF BEES, UNINTENTIONAL, SUBSEQUENT ENCOUNTER: Primary | ICD-10-CM

## 2019-05-09 DIAGNOSIS — T63.441D ANAPHYLACTIC REACTION TO BEE STING, ACCIDENTAL OR UNINTENTIONAL, SUBSEQUENT ENCOUNTER: ICD-10-CM

## 2019-05-09 PROCEDURE — 95117 IMMUNOTHERAPY INJECTIONS: CPT

## 2019-05-09 PROCEDURE — 95145 ANTIGEN THERAPY SERVICES: CPT | Performed by: ALLERGY & IMMUNOLOGY

## 2019-05-09 PROCEDURE — 99207 ZZC DROP WITH A PROCEDURE: CPT

## 2019-05-09 RX ORDER — EPINEPHRINE 0.3 MG/.3ML
0.3 INJECTION SUBCUTANEOUS PRN
Qty: 0.6 ML | Refills: 11 | Status: SHIPPED | OUTPATIENT
Start: 2019-05-09 | End: 2020-10-08

## 2019-05-09 NOTE — TELEPHONE ENCOUNTER
Allergy serums received at [place].     Vials received below:    Vial Color Content                      Vial Size Expiration Date  Red 1:1 Mixed Vespid 13mL  05/06/2020    Signature  Yael Anton [facility] ......5/9/2019...1:29 PM

## 2019-05-09 NOTE — PROGRESS NOTES
Allergy serums billed at [place].     Vials received below:    Vial Color Content                      Vial Size Expiration Date  Red 1:1 Mixed Vespid 13mL  05/06/2020  Original Refill encounter date: 05/03/2019    Signature  Yael Anton CMA ......5/9/2019...1:31 PM

## 2019-05-09 NOTE — PROGRESS NOTES
Patient presented after waiting 30 minutes with no reaction to allergy injections. Discharged from clinic.    Latha Ding RN ............   5/9/2019...2:19 PM

## 2019-05-17 PROCEDURE — 82274 ASSAY TEST FOR BLOOD FECAL: CPT | Performed by: FAMILY MEDICINE

## 2019-05-19 LAB — HEMOCCULT STL QL IA: NEGATIVE

## 2019-06-06 ENCOUNTER — ALLIED HEALTH/NURSE VISIT (OUTPATIENT)
Dept: ALLERGY | Facility: CLINIC | Age: 59
End: 2019-06-06
Payer: COMMERCIAL

## 2019-06-06 DIAGNOSIS — T63.441D TOXIC EFFECT OF VENOM OF BEES, UNINTENTIONAL, SUBSEQUENT ENCOUNTER: Primary | ICD-10-CM

## 2019-06-06 PROCEDURE — 99207 ZZC DROP WITH A PROCEDURE: CPT

## 2019-06-06 PROCEDURE — 95117 IMMUNOTHERAPY INJECTIONS: CPT

## 2019-06-06 NOTE — PROGRESS NOTES
Patient presented after waiting 30 minutes with no reaction to allergy injections. Discharged from clinic.    Hussain Young RN....6/6/2019 2:12 PM

## 2019-06-13 ENCOUNTER — ALLIED HEALTH/NURSE VISIT (OUTPATIENT)
Dept: ALLERGY | Facility: CLINIC | Age: 59
End: 2019-06-13
Payer: COMMERCIAL

## 2019-06-13 DIAGNOSIS — T63.441D TOXIC EFFECT OF VENOM OF BEES, UNINTENTIONAL, SUBSEQUENT ENCOUNTER: Primary | ICD-10-CM

## 2019-06-13 PROCEDURE — 95117 IMMUNOTHERAPY INJECTIONS: CPT

## 2019-06-13 PROCEDURE — 99207 ZZC DROP WITH A PROCEDURE: CPT

## 2019-06-13 NOTE — PROGRESS NOTES
Patient presented after waiting 30 minutes with no reaction to allergy injections. Discharged from clinic.    Hussain Young RN....6/13/2019 2:14 PM

## 2019-06-20 ENCOUNTER — ALLIED HEALTH/NURSE VISIT (OUTPATIENT)
Dept: ALLERGY | Facility: CLINIC | Age: 59
End: 2019-06-20
Payer: COMMERCIAL

## 2019-06-20 DIAGNOSIS — T63.441D TOXIC EFFECT OF VENOM OF BEES, UNINTENTIONAL, SUBSEQUENT ENCOUNTER: Primary | ICD-10-CM

## 2019-06-20 PROCEDURE — 99207 ZZC DROP WITH A PROCEDURE: CPT

## 2019-06-20 PROCEDURE — 95117 IMMUNOTHERAPY INJECTIONS: CPT

## 2019-06-20 NOTE — PROGRESS NOTES
Patient presented after waiting 30 minutes with no reaction to allergy injections. Discharged from clinic.    Latha Ding RN ............   6/20/2019...3:08 PM

## 2019-07-08 ENCOUNTER — TELEPHONE (OUTPATIENT)
Dept: FAMILY MEDICINE | Facility: CLINIC | Age: 59
End: 2019-07-08

## 2019-07-08 NOTE — TELEPHONE ENCOUNTER
Pt states her pulse was 47 and 44 this morning and she was a little dizzy. She drank two propel and it went up and she is no longer dizzy.  Pt takes the lisinopril at night.  Pt states she has had this happen a few times with readings in the 40's for her pulse in the mornings mostly.    Pt states she is retired now and has a lot less stress.  She has missed taking her blood pressure medication a few times and took her blood pressure and it was normal.    Pt asking if the lisinopril could be causing the low pulse and if she really needs to be on it.    To provider to advise.  Mouna VEGAN, RN

## 2019-07-08 NOTE — TELEPHONE ENCOUNTER
Patient states she is a little dizzy and would like to talk to someone.  Please call.    Thank you.

## 2019-07-08 NOTE — TELEPHONE ENCOUNTER
Patient notified of provider note as directed.  She will take blood pressures at varying times for the next 2 weeks and will call them in to the provider for further direction on lisinopril.  Patient verbalizes good understanding, agrees with plan and states she needs no further support. Deena Pena R.N.

## 2019-07-08 NOTE — TELEPHONE ENCOUNTER
Lisinopril should not affect her heart rate.  Needs to check blood pressure on a validated cuff to assess if can go off of lisinopril.  Check once daily, vary the times and can then call the results in to me.

## 2019-07-17 ENCOUNTER — MEDICAL CORRESPONDENCE (OUTPATIENT)
Dept: HEALTH INFORMATION MANAGEMENT | Facility: CLINIC | Age: 59
End: 2019-07-17

## 2019-07-18 ENCOUNTER — ALLIED HEALTH/NURSE VISIT (OUTPATIENT)
Dept: ALLERGY | Facility: CLINIC | Age: 59
End: 2019-07-18
Payer: COMMERCIAL

## 2019-07-18 ENCOUNTER — TELEPHONE (OUTPATIENT)
Dept: FAMILY MEDICINE | Facility: CLINIC | Age: 59
End: 2019-07-18

## 2019-07-18 DIAGNOSIS — T63.441D TOXIC EFFECT OF VENOM OF BEES, UNINTENTIONAL, SUBSEQUENT ENCOUNTER: Primary | ICD-10-CM

## 2019-07-18 DIAGNOSIS — I10 HYPERTENSION GOAL BP (BLOOD PRESSURE) < 140/90: Primary | ICD-10-CM

## 2019-07-18 PROCEDURE — 99207 ZZC DROP WITH A PROCEDURE: CPT

## 2019-07-18 PROCEDURE — 95117 IMMUNOTHERAPY INJECTIONS: CPT

## 2019-07-18 NOTE — PROGRESS NOTES
Patient presented after waiting 30 minutes with no reaction to allergy injections. Discharged from clinic.    Latha Ding RN ............   7/18/2019...2:42 PM

## 2019-07-18 NOTE — TELEPHONE ENCOUNTER
Pt walked in and has a list of her blood pressure for once a day for 2 weeks per Dr Saez's request.    Please call to discuss if needed.    Thank you

## 2019-07-23 ENCOUNTER — ALLIED HEALTH/NURSE VISIT (OUTPATIENT)
Dept: FAMILY MEDICINE | Facility: CLINIC | Age: 59
End: 2019-07-23
Payer: COMMERCIAL

## 2019-07-23 VITALS — HEART RATE: 60 BPM | DIASTOLIC BLOOD PRESSURE: 70 MMHG | SYSTOLIC BLOOD PRESSURE: 134 MMHG

## 2019-07-23 DIAGNOSIS — R03.0 ELEVATED BLOOD PRESSURE READING WITHOUT DIAGNOSIS OF HYPERTENSION: Primary | ICD-10-CM

## 2019-07-23 PROCEDURE — 99207 ZZC NO CHARGE NURSE ONLY: CPT | Performed by: FAMILY MEDICINE

## 2019-07-23 NOTE — PROGRESS NOTES
Kaleigh Tyson was evaluated at [facility] on July 23, 2019 at which time her blood pressure was:    BP Readings from Last 3 Encounters:   07/23/19 134/70   03/21/19 127/79   12/31/18 128/70     Pulse Readings from Last 3 Encounters:   07/23/19 60   03/21/19 67   12/31/18 58       Reviewed lifestyle modifications for blood pressure control and reduction: including making healthy food choices, managing weight, getting regular exercise, smoking cessation, reducing alcohol consumption, monitoring blood pressure regularly.     Symptoms: None    BP Goal:< 140/90 mmHg    BP Assessment:  BP at goal    Potential Reasons for BP too high: NA - Not applicable    BP Follow-Up Plan: Recheck BP in 6 months at pharmacy    Recommendation to Provider: None at this time. Continue current therapy.    Note completed by: Natalia Melendrez, PharmD  [facility]

## 2019-07-25 ENCOUNTER — OFFICE VISIT (OUTPATIENT)
Dept: ALLERGY | Facility: CLINIC | Age: 59
End: 2019-07-25
Payer: COMMERCIAL

## 2019-07-25 VITALS
BODY MASS INDEX: 25.61 KG/M2 | SYSTOLIC BLOOD PRESSURE: 142 MMHG | HEART RATE: 95 BPM | DIASTOLIC BLOOD PRESSURE: 88 MMHG | TEMPERATURE: 97.9 F | WEIGHT: 150 LBS | OXYGEN SATURATION: 98 % | HEIGHT: 64 IN

## 2019-07-25 DIAGNOSIS — R03.0 ELEVATED BLOOD PRESSURE READING WITHOUT DIAGNOSIS OF HYPERTENSION: ICD-10-CM

## 2019-07-25 DIAGNOSIS — T78.2XXD ANAPHYLAXIS, SUBSEQUENT ENCOUNTER: ICD-10-CM

## 2019-07-25 PROCEDURE — 99213 OFFICE O/P EST LOW 20 MIN: CPT | Performed by: ALLERGY & IMMUNOLOGY

## 2019-07-25 ASSESSMENT — MIFFLIN-ST. JEOR: SCORE: 1255.4

## 2019-07-25 NOTE — LETTER
[place]                   FOOD ALLERGY & ANAPHYLAXIS EMERGENCY CARE PLAN  Food Allergy Research & Education         Name: Kaleigh OLAF Tyson D.O.B.:  [id number]    Allergy to: venom  Weight: 150 lbs 0 oz lbs.  Asthma:  No    -NOTE: Do not depend on antihistamines or inhalers (bronchodilators) to treat a severe reaction. USE EPINEPHRINE.     MEDICATIONS/DOSES  Epinephrine Brand: EpiPen  Epinephrine Dose: 0.3 mg IM  Antihistamine Brand or Generic: Zyrtec (Cetirizine)  Antihistamine Dose: 10mg         FARE                   FOOD ALLERGY & ANAPHYLAXIS EMERGENCY CARE PLAN   Food Allergy Research & Education           EMERGENCY CONTACTS - CALL 911  DOCTOR:  Modesto Estes DO   PHONE: [phone]  PARENT/GUARDIAN:              PHONE:  OTHER EMERGENCY CONTACTS  NAME/RELATIONSHIP:   PHONE:   NAME/RELATIONSHIP:    PHONE:           PARENT/GUARDIAN AUTHORIZATION SIGNATURE     DATE              PHYSICIAN/H CP AUTHORIZATION SIGNATURE         DATE  FORM PROVIDED [employer] OF FOOD ALLERGY RESEARCH & EDUCATION (FARE) ([email or web address]) 2014

## 2019-07-25 NOTE — ASSESSMENT & PLAN NOTE
History of anaphylaxis in 2016 after insect sting. Anaphylaxis in 2018 thought related to insect sting.   On venom allergy shots for mixed vespid and wasp.  Tolerating.  No intervals stings.  Normal serum tryptase.    -Continue venom immunotherapy.  Continue to carry injectable epinephrine.  Updated anaphylaxis action plan was provided.  -Continue to avoid flying stinging insects.

## 2019-07-25 NOTE — PROGRESS NOTES
Kaleigh Tyson is a 58 year old White female with previous medical history significant for venom allergy who returns for a follow up visit.     Patient is on venom immunotherapy.  She has been on for 1 year.  No accidental stings over the course of the last year.  She is tolerating venom allergy shots.  Normal serum tryptase.  She is on shots for mixed vespid and wasp.      Past Medical History:   Diagnosis Date     Cervical high risk HPV (human papillomavirus) test positive 03/21/2019    see problem list     Elevated cholesterol     Low HDL - 41 in 1/09     Family History   Problem Relation Age of Onset     Heart Disease Father      Dementia Father      Neurologic Disorder Brother      Lipids Sister      Lipids Sister      Myocardial Infarction Mother      Hypertension Mother      Past Surgical History:   Procedure Laterality Date     NO HISTORY OF SURGERY         REVIEW OF SYSTEMS:  General: negative for weight gain. negative for weight loss. negative for changes in sleep.   Ears: negative for fullness. negative for hearing loss. negative for dizziness.   Nose: negative for snoring.negative for changes in smell. negative for drainage.   Eyes: negative for eye watering. negative for eye itching. negative for vision changes. negative for eye redness.  Throat: negative for hoarseness. negative for sore throat. negative for trouble swallowing.   Lungs: negative for shortness of breath.negative for wheezing. negative for sputum production.   Cardiovascular: negative for chest pain. negative for swelling of ankles. negative for fast or irregular heartbeat.   Gastrointestinal: negative for nausea. negative for heartburn. negative for acid reflux.   Musculoskeletal: negative for joint pain. negative for joint stiffness. negative for joint swelling.   Neurologic: negative for seizures. negative for fainting. negative for weakness.   Psychiatric: negative for changes in mood. negative for anxiety.   Endocrine: negative for  cold intolerance. negative for heat intolerance. negative for tremors.   Lymphatic: negative for lower extremity swelling. negative for lymph node swelling.   Hematologic: negative for easy bruising. negative for easy bleeding.  Integumentary: negative for rash. negative for scaling. negative for nail changes.       Current Outpatient Medications:      EPINEPHrine (EPIPEN/ADRENACLICK/OR ANY BX GENERIC EQUIV) 0.3 MG/0.3ML injection 2-pack, Inject 0.3 mLs (0.3 mg) into the muscle as needed for anaphylaxis, Disp: 0.6 mL, Rfl: 11     lisinopril (PRINIVIL/ZESTRIL) 10 MG tablet, Take 1 tablet (10 mg) by mouth daily, Disp: 90 tablet, Rfl: 1     ORDER FOR ALLERGEN IMMUNOTHERAPY, Mixed Vespid Venom 300 mcg/mL HS 12 ml Diluent: HSA qs to 5ml, Disp: 12 mL, Rfl: prn     ORDER FOR ALLERGEN IMMUNOTHERAPY, Wasp Venom 100 mcg/mL HS 12 ml Diluent: HSA qs to 5ml, Disp: 12 mL, Rfl: prn  Immunization History   Administered Date(s) Administered     HepB-Adult 02/22/2019     Mantoux Tuberculin Skin Test 02/22/2019     TD (ADULT, 7+) 06/16/2004     TDAP Vaccine (Adacel) 08/02/2016     Zoster vaccine recombinant adjuvanted (SHINGRIX) 05/14/2019     Allergies   Allergen Reactions     Bees Hives and Swelling     Pyridoxine Hives     Other reaction(s): Angioedema         EXAM:   Constitutional:  Appears well-developed and well-nourished. No distress.   HEENT:   Head: Normocephalic.   Mouth/Throat: No oropharyngeal exudate present.   No cobblestoning of posterior oropharynx.   Nasal tissue pink and normal appearing.  No rhinorrhea noted.    Eyes: Conjunctivae are non-erythematous   No maxillary or frontal sinus tenderness to palpation.   Cardiovascular: Normal rate, regular rhythm and normal heart sounds. Exam reveals no gallop and no friction rub.   No murmur heard.  Respiratory: Effort normal and breath sounds normal. No respiratory distress. No wheezes. No rales.   Musculoskeletal: Normal range of motion.   Neuro: Oriented to person, place,  and time.  Skin: Skin is warm and dry. No rash noted.   Psychiatric: Normal mood and affect.     Nursing note and vitals reviewed.      ASSESSMENT/PLAN:  Problem List Items Addressed This Visit        Other    Anaphylaxis, subsequent encounter     History of anaphylaxis in 2016 after insect sting. Anaphylaxis in 2018 thought related to insect sting.   On venom allergy shots for mixed vespid and wasp.  Tolerating.  No intervals stings.  Normal serum tryptase.    -Continue venom immunotherapy.  Continue to carry injectable epinephrine.  Updated anaphylaxis action plan was provided.  -Continue to avoid flying stinging insects.            Elevated blood pressure reading without diagnosis of hypertension     Kaleigh to follow up with Primary Care provider regarding elevated blood pressure.               Return in 1 year.  Chart documentation with Dragon Voice recognition Software. Although reviewed after completion, some words and grammatical errors may remain.    Modesto Estes DO FAAAAI  Allergy/Immunology  [facility], [place]

## 2019-07-25 NOTE — PATIENT INSTRUCTIONS
Allergy Staff Appt Hours Shot Hours Locations    Physician     Modesto Estes DO       Support Staff     FAVIAN Pedroza, LNEA  Tuesday:        [place] 7-4:20     Wednesday:        [place]: 7-5     Thursday:                    [place] 7-6:40     Friday:        [place] 7-2:40   [place]        Thursday: 1-5:50        Friday: 7-10:50     [place]        Tuesday: 7- 3:20        Wednesday: 7-4:20     [place] Monday: 7-4:20        Tuesday: 1-6:20         [facility]  [address] [place] [address]  Appt Line: [phone]  Allergy RN:  [phone]    [facility]  [address] [place] [address]  Appt Line: [phone]  Allergy RN:  [phone]       Important Scheduling Information  Aspirin Desensitization: Appt will last 2 clinic days. Please call the Allergy RN line for your clinic to schedule. Discontinue antihistamines 7 days prior to the appointment.     Food Challenges: Appt will last 3-4 hours. Please call the Allergy RN line for your clinic to schedule. Discontinue antihistamines 7 days prior to the appointment.     Penicillin Testing: Appt will last 2-3 hours. Please call the Allergy RN line for your clinic to schedule. Discontinue antihistamines 7 days prior to the appointment.     Skin Testing: Appt will about 40 minutes. Call the appointment line for your clinic to schedule. Discontinue antihistamines 7 days prior to the appointment.     Venom Testing: Appt will last 2-3 hours. Please call the Allergy RN line for your clinic to schedule. Discontinue antihistamines 7 days prior to the appointment.     Thank you for trusting us with your Allergy, Asthma, and Immunology care. Please feel free to contact us with any questions or concerns you may have.      - Continue venom allergy shots. Start getting every 6 weeks.   - Continue to carry epipen.

## 2019-07-25 NOTE — LETTER
7/25/2019         RE: Kaleigh Tyson  [address] [facility] [address]        Dear Colleague,    Thank you for referring your patient, Kaleigh Tyson, to the [facility]. Please see a copy of my visit note below.    Kaleigh Tyson is a 58 year old White female with previous medical history significant for venom allergy who returns for a follow up visit.     Patient is on venom immunotherapy.  She has been on for 1 year.  No accidental stings over the course of the last year.  She is tolerating venom allergy shots.  Normal serum tryptase.  She is on shots for mixed vespid and wasp.      Past Medical History:   Diagnosis Date     Cervical high risk HPV (human papillomavirus) test positive 03/21/2019    see problem list     Elevated cholesterol     Low HDL - 41 in 1/09     Family History   Problem Relation Age of Onset     Heart Disease Father      Dementia Father      Neurologic Disorder Brother      Lipids Sister      Lipids Sister      Myocardial Infarction Mother      Hypertension Mother      Past Surgical History:   Procedure Laterality Date     NO HISTORY OF SURGERY         REVIEW OF SYSTEMS:  General: negative for weight gain. negative for weight loss. negative for changes in sleep.   Ears: negative for fullness. negative for hearing loss. negative for dizziness.   Nose: negative for snoring.negative for changes in smell. negative for drainage.   Eyes: negative for eye watering. negative for eye itching. negative for vision changes. negative for eye redness.  Throat: negative for hoarseness. negative for sore throat. negative for trouble swallowing.   Lungs: negative for shortness of breath.negative for wheezing. negative for sputum production.   Cardiovascular: negative for chest pain. negative for swelling of ankles. negative for fast or irregular heartbeat.   Gastrointestinal: negative for nausea. negative for heartburn. negative for acid reflux.   Musculoskeletal: negative for joint  pain. negative for joint stiffness. negative for joint swelling.   Neurologic: negative for seizures. negative for fainting. negative for weakness.   Psychiatric: negative for changes in mood. negative for anxiety.   Endocrine: negative for cold intolerance. negative for heat intolerance. negative for tremors.   Lymphatic: negative for lower extremity swelling. negative for lymph node swelling.   Hematologic: negative for easy bruising. negative for easy bleeding.  Integumentary: negative for rash. negative for scaling. negative for nail changes.       Current Outpatient Medications:      EPINEPHrine (EPIPEN/ADRENACLICK/OR ANY BX GENERIC EQUIV) 0.3 MG/0.3ML injection 2-pack, Inject 0.3 mLs (0.3 mg) into the muscle as needed for anaphylaxis, Disp: 0.6 mL, Rfl: 11     lisinopril (PRINIVIL/ZESTRIL) 10 MG tablet, Take 1 tablet (10 mg) by mouth daily, Disp: 90 tablet, Rfl: 1     ORDER FOR ALLERGEN IMMUNOTHERAPY, Mixed Vespid Venom 300 mcg/mL HS 12 ml Diluent: HSA qs to 5ml, Disp: 12 mL, Rfl: prn     ORDER FOR ALLERGEN IMMUNOTHERAPY, Wasp Venom 100 mcg/mL HS 12 ml Diluent: HSA qs to 5ml, Disp: 12 mL, Rfl: prn  Immunization History   Administered Date(s) Administered     HepB-Adult 02/22/2019     Mantoux Tuberculin Skin Test 02/22/2019     TD (ADULT, 7+) 06/16/2004     TDAP Vaccine (Adacel) 08/02/2016     Zoster vaccine recombinant adjuvanted (SHINGRIX) 05/14/2019     Allergies   Allergen Reactions     Bees Hives and Swelling     Pyridoxine Hives     Other reaction(s): Angioedema         EXAM:   Constitutional:  Appears well-developed and well-nourished. No distress.   HEENT:   Head: Normocephalic.   Mouth/Throat: No oropharyngeal exudate present.   No cobblestoning of posterior oropharynx.   Nasal tissue pink and normal appearing.  No rhinorrhea noted.    Eyes: Conjunctivae are non-erythematous   No maxillary or frontal sinus tenderness to palpation.   Cardiovascular: Normal rate, regular rhythm and normal heart sounds.  Exam reveals no gallop and no friction rub.   No murmur heard.  Respiratory: Effort normal and breath sounds normal. No respiratory distress. No wheezes. No rales.   Musculoskeletal: Normal range of motion.   Neuro: Oriented to person, place, and time.  Skin: Skin is warm and dry. No rash noted.   Psychiatric: Normal mood and affect.     Nursing note and vitals reviewed.      ASSESSMENT/PLAN:  Problem List Items Addressed This Visit        Other    Anaphylaxis, subsequent encounter     History of anaphylaxis in 2016 after insect sting. Anaphylaxis in 2018 thought related to insect sting.    On venom allergy shots for mixed vespid and wasp.  Tolerating.  No intervals stings.  Normal serum tryptase.    -Continue venom immunotherapy.  Continue to carry injectable epinephrine.  Updated anaphylaxis action plan was provided.  -Continue to avoid flying stinging insects.            Elevated blood pressure reading without diagnosis of hypertension     Kaleigh to follow up with Primary Care provider regarding elevated blood pressure.               Return in 1 year.  Chart documentation with Dragon Voice recognition Software. Although reviewed after completion, some words and grammatical errors may remain.    Modesto Estes DO FAAAAI  Allergy/Immunology  [place], [place]      Again, thank you for allowing me to participate in the care of your patient.        Sincerely,        Modesto Estes DO

## 2019-07-26 NOTE — TELEPHONE ENCOUNTER
Readings all to goal. If pt was off lisinopril for these readings, can remain off.  Needs to continue to check bp periodically.

## 2019-07-26 NOTE — TELEPHONE ENCOUNTER
Pt notified of provider message as written.  Pt did have a higher reading yesterday with her appointment with Dr. Estes but that was because she was ill with diarrhea and that has resolved.      Pt states she is currently taking the lisinopril 10 mg and was when she took these readings.  She would be interested in stopping it if you think that would be ok to try or should she stay on since she had good readings?  Mouna Padron BSN, RN

## 2019-07-26 NOTE — TELEPHONE ENCOUNTER
Needs to stay on lisinopril 10 mg daily as her readings are not too low and some were upper 130's.      Currently due for bmp.  Ordered.    She will need to complete this before due for bp check in pharmacy in 9/19 for her refill

## 2019-07-26 NOTE — TELEPHONE ENCOUNTER
Left message on answering machine for patient to call back to [phone].  Mouna Padron BSN, RN

## 2019-07-29 NOTE — TELEPHONE ENCOUNTER
Patient/parent is informed of MD note below, as it is written. Verbalized good understanding.    Clarified patient will also have Lipids completed in September per office visit notes 3/21/19.      Fasting labs include nothing to eat or drink 10-12 hours prior to your lab draw, water is okay.  Coffee, tea, breath mints and chewing gum all count as food.     Patient/parent verbalized understanding of instructions provided and agreed with the plan of care  Lidia Olivares RN

## 2019-08-16 NOTE — TELEPHONE ENCOUNTER
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  Advised her that it would be fine if it was not strenuous activity.  Patients should avoid strenuous activity for several hours before and after their allergy shots.    Patient understands, no further questions.  Closing encounter.    Cynthia Dubose RN    
Abdominal Pain, N/V/D

## 2019-08-29 ENCOUNTER — ALLIED HEALTH/NURSE VISIT (OUTPATIENT)
Dept: ALLERGY | Facility: CLINIC | Age: 59
End: 2019-08-29
Payer: COMMERCIAL

## 2019-08-29 ENCOUNTER — ALLIED HEALTH/NURSE VISIT (OUTPATIENT)
Dept: FAMILY MEDICINE | Facility: CLINIC | Age: 59
End: 2019-08-29

## 2019-08-29 VITALS — DIASTOLIC BLOOD PRESSURE: 70 MMHG | SYSTOLIC BLOOD PRESSURE: 118 MMHG

## 2019-08-29 DIAGNOSIS — T63.441D TOXIC EFFECT OF VENOM OF BEES, UNINTENTIONAL, SUBSEQUENT ENCOUNTER: Primary | ICD-10-CM

## 2019-08-29 DIAGNOSIS — R03.0 ELEVATED BLOOD PRESSURE READING WITHOUT DIAGNOSIS OF HYPERTENSION: Primary | ICD-10-CM

## 2019-08-29 PROCEDURE — 99207 ZZC NO CHARGE NURSE ONLY: CPT | Performed by: FAMILY MEDICINE

## 2019-08-29 PROCEDURE — 95117 IMMUNOTHERAPY INJECTIONS: CPT

## 2019-08-29 PROCEDURE — 99207 ZZC DROP WITH A PROCEDURE: CPT

## 2019-08-29 NOTE — PROGRESS NOTES
Patient presented after waiting 30 minutes with no reaction to allergy injections. Discharged from clinic.    Latha Ding RN, RN ............   8/29/2019...3:11 PM

## 2019-08-29 NOTE — PROGRESS NOTES
Kaleigh Tyson was evaluated at [facility] on August 29, 2019 at which time her blood pressure was:    BP Readings from Last 3 Encounters:   08/29/19 118/70   07/25/19 142/88   07/23/19 134/70     Pulse Readings from Last 3 Encounters:   07/25/19 95   07/23/19 60   03/21/19 67       Reviewed lifestyle modifications for blood pressure control and reduction: including making healthy food choices, managing weight, getting regular exercise, smoking cessation, reducing alcohol consumption, monitoring blood pressure regularly.     Symptoms: None    BP Goal:< 140/90 mmHg    BP Assessment:  BP at goal    Potential Reasons for BP too high: NA - Not applicable    BP Follow-Up Plan: Recheck BP in 6 months at pharmacy    Recommendation to Provider: Continue Current Therapy    Note completed by: Kendrick De Los Santos PharmD - Float Pharmacist, on behalf of [facility]

## 2019-09-11 DIAGNOSIS — I10 HYPERTENSION GOAL BP (BLOOD PRESSURE) < 140/90: ICD-10-CM

## 2019-09-12 RX ORDER — LISINOPRIL 10 MG/1
10 TABLET ORAL DAILY
Qty: 90 TABLET | Refills: 1 | Status: SHIPPED | OUTPATIENT
Start: 2019-09-12 | End: 2020-02-20

## 2019-09-23 DIAGNOSIS — Z00.00 ROUTINE GENERAL MEDICAL EXAMINATION AT A HEALTH CARE FACILITY: ICD-10-CM

## 2019-09-23 DIAGNOSIS — I10 HYPERTENSION GOAL BP (BLOOD PRESSURE) < 140/90: ICD-10-CM

## 2019-09-23 LAB
ANION GAP SERPL CALCULATED.3IONS-SCNC: 4 MMOL/L (ref 3–14)
BUN SERPL-MCNC: 11 MG/DL (ref 7–30)
CALCIUM SERPL-MCNC: 9.1 MG/DL (ref 8.5–10.1)
CHLORIDE SERPL-SCNC: 107 MMOL/L (ref 94–109)
CHOLEST SERPL-MCNC: 277 MG/DL
CO2 SERPL-SCNC: 27 MMOL/L (ref 20–32)
CREAT SERPL-MCNC: 0.7 MG/DL (ref 0.52–1.04)
GFR SERPL CREATININE-BSD FRML MDRD: >90 ML/MIN/{1.73_M2}
GLUCOSE SERPL-MCNC: 106 MG/DL (ref 70–99)
HDLC SERPL-MCNC: 37 MG/DL
LDLC SERPL CALC-MCNC: 188 MG/DL
NONHDLC SERPL-MCNC: 240 MG/DL
POTASSIUM SERPL-SCNC: 4.2 MMOL/L (ref 3.4–5.3)
SODIUM SERPL-SCNC: 138 MMOL/L (ref 133–144)
TRIGL SERPL-MCNC: 260 MG/DL

## 2019-09-23 PROCEDURE — 80061 LIPID PANEL: CPT | Performed by: FAMILY MEDICINE

## 2019-09-23 PROCEDURE — 36415 COLL VENOUS BLD VENIPUNCTURE: CPT | Performed by: FAMILY MEDICINE

## 2019-09-23 PROCEDURE — 80048 BASIC METABOLIC PNL TOTAL CA: CPT | Performed by: FAMILY MEDICINE

## 2019-10-10 ENCOUNTER — TELEPHONE (OUTPATIENT)
Dept: FAMILY MEDICINE | Facility: CLINIC | Age: 59
End: 2019-10-10

## 2019-10-10 ENCOUNTER — ALLIED HEALTH/NURSE VISIT (OUTPATIENT)
Dept: ALLERGY | Facility: CLINIC | Age: 59
End: 2019-10-10
Payer: COMMERCIAL

## 2019-10-10 DIAGNOSIS — T63.441D TOXIC EFFECT OF VENOM OF BEES, UNINTENTIONAL, SUBSEQUENT ENCOUNTER: Primary | ICD-10-CM

## 2019-10-10 PROCEDURE — 95117 IMMUNOTHERAPY INJECTIONS: CPT

## 2019-10-10 NOTE — PROGRESS NOTES
Patient presented after waiting 30 minutes with no reaction to allergy injections. Discharged from clinic.    Latha Ding RN, RN ............   10/10/2019...1:40 PM

## 2019-10-10 NOTE — TELEPHONE ENCOUNTER
RN returned call to pt to clarify request. Pt states she wants a result letter with her 9/23/19 results included. Pt states she never received her 9/23/19 results.    Pt is specifically inquiring about her 9/23/19 lipid panel results and plan. RN advised message will be forwarded to provider to review and advise regarding the elevated lipid panel results.    Pt requests a return call and a letter, with her results and plan.    No provider result notes found, please review and advise regarding 9/23/19 lab results.    SILVIA VillegasN, RN

## 2019-10-10 NOTE — TELEPHONE ENCOUNTER
Reason for Call:  Request for results:    Name of test or procedure: Blood Pressure     Date of test of procedure: 9/23/2019    Location of the test or procedure: [place]    OK to leave the result message on voice mail or with a family member? YES    Phone number Patient can be reached at:  Cell number on file:    Telephone Information:   Mobile [phone]       Additional comments: Pt said it was to be mailed to her but she never received it. She is here seeing Allergy if it could be printed today. Thanks    Call taken on 10/10/2019 at 1:01 PM by Abby Olguin

## 2019-11-22 ENCOUNTER — ALLIED HEALTH/NURSE VISIT (OUTPATIENT)
Dept: ALLERGY | Facility: CLINIC | Age: 59
End: 2019-11-22
Payer: COMMERCIAL

## 2019-11-22 DIAGNOSIS — T63.441D TOXIC EFFECT OF VENOM OF BEES, UNINTENTIONAL, SUBSEQUENT ENCOUNTER: Primary | ICD-10-CM

## 2019-11-22 PROCEDURE — 99207 ZZC DROP WITH A PROCEDURE: CPT

## 2019-11-22 PROCEDURE — 95117 IMMUNOTHERAPY INJECTIONS: CPT

## 2019-11-22 NOTE — NURSING NOTE
Patient presented after waiting 30 minutes with no reaction to allergy injections. Discharged from clinic.    Latha Ding RN, RN ............   11/22/2019...10:59 AM

## 2020-01-03 ENCOUNTER — ALLIED HEALTH/NURSE VISIT (OUTPATIENT)
Dept: ALLERGY | Facility: CLINIC | Age: 60
End: 2020-01-03
Payer: COMMERCIAL

## 2020-01-03 DIAGNOSIS — T63.441D TOXIC EFFECT OF VENOM OF BEES, UNINTENTIONAL, SUBSEQUENT ENCOUNTER: Primary | ICD-10-CM

## 2020-01-03 PROCEDURE — 95117 IMMUNOTHERAPY INJECTIONS: CPT

## 2020-01-03 PROCEDURE — 99207 ZZC DROP WITH A PROCEDURE: CPT

## 2020-01-03 NOTE — PROGRESS NOTES
Patient presented after waiting 30 minutes with no reaction to allergy injections. Discharged from clinic.    Hussain Young RN....1/3/2020 8:39 AM

## 2020-02-11 ENCOUNTER — ALLIED HEALTH/NURSE VISIT (OUTPATIENT)
Dept: FAMILY MEDICINE | Facility: CLINIC | Age: 60
End: 2020-02-11
Payer: COMMERCIAL

## 2020-02-11 VITALS — SYSTOLIC BLOOD PRESSURE: 136 MMHG | DIASTOLIC BLOOD PRESSURE: 72 MMHG | HEART RATE: 68 BPM

## 2020-02-11 DIAGNOSIS — Z01.30 BLOOD PRESSURE CHECK: Primary | ICD-10-CM

## 2020-02-11 PROCEDURE — 99207 ZZC NO CHARGE NURSE ONLY: CPT | Performed by: FAMILY MEDICINE

## 2020-02-11 NOTE — PROGRESS NOTES
Kaleigh Tyson was evaluated at [facility] on February 11, 2020 at which time her blood pressure was:    BP Readings from Last 3 Encounters:   02/11/20 136/72   08/29/19 118/70   07/25/19 142/88     Pulse Readings from Last 3 Encounters:   02/11/20 68   07/25/19 95   07/23/19 60       Reviewed lifestyle modifications for blood pressure control and reduction: including making healthy food choices, managing weight, getting regular exercise, smoking cessation, reducing alcohol consumption, monitoring blood pressure regularly.     Symptoms: None    BP Goal:< 140/90 mmHg    BP Assessment:  BP at goal    Potential Reasons for BP too high: NA - Not applicable    BP Follow-Up Plan: Recheck BP in 6 months at pharmacy    Recommendation to Provider: None    Note completed by: Jose Parikh RPh.  [facility]  [phone]

## 2020-02-20 ENCOUNTER — ALLIED HEALTH/NURSE VISIT (OUTPATIENT)
Dept: ALLERGY | Facility: CLINIC | Age: 60
End: 2020-02-20
Payer: COMMERCIAL

## 2020-02-20 DIAGNOSIS — T63.441D TOXIC EFFECT OF VENOM OF BEES, UNINTENTIONAL, SUBSEQUENT ENCOUNTER: Primary | ICD-10-CM

## 2020-02-20 DIAGNOSIS — I10 HYPERTENSION GOAL BP (BLOOD PRESSURE) < 140/90: ICD-10-CM

## 2020-02-20 PROCEDURE — 95117 IMMUNOTHERAPY INJECTIONS: CPT

## 2020-02-20 PROCEDURE — 99207 ZZC DROP WITH A PROCEDURE: CPT

## 2020-02-20 RX ORDER — LISINOPRIL 10 MG/1
10 TABLET ORAL DAILY
Qty: 90 TABLET | Refills: 0 | Status: SHIPPED | OUTPATIENT
Start: 2020-02-20 | End: 2020-05-12

## 2020-02-20 NOTE — TELEPHONE ENCOUNTER
Next 5 appointments (look out 90 days)    Feb 20, 2020  1:40 PM CST  Nurse Only with AN ALLERGY SHOTS  [facility] ([facility] [address] [facility] [address]  [phone]   Mar 25, 2020  8:15 AM CDT  PHYSICAL with Lauren Saez MD  [facility] ([facility]) [address] [facility] [address]  [phone]        Rx refilled per [facility] refill protocol.    Evelyn Balderas BSN, RN

## 2020-02-20 NOTE — PROGRESS NOTES
Patient presented after waiting 30 minutes with no reaction to allergy injections. Discharged from clinic.    Hussain Young RN....2/20/2020 1:56 PM

## 2020-02-24 ENCOUNTER — HEALTH MAINTENANCE LETTER (OUTPATIENT)
Age: 60
End: 2020-02-24

## 2020-03-11 NOTE — PROGRESS NOTES
SUBJECTIVE:   CC: Kaleigh Tyson is an 59 year old woman who presents for preventive health visit.     Healthy Habits:     Getting at least 3 servings of Calcium per day:  Yes    Bi-annual eye exam:  Yes    Dental care twice a year:  Yes    Sleep apnea or symptoms of sleep apnea:  None    Diet:  Regular (no restrictions)    Frequency of exercise:  4-5 days/week    Duration of exercise:  45-60 minutes    Taking medications regularly:  Yes    Medication side effects:  None    PHQ-2 Total Score: 0    Additional concerns today:  No    Questioning if recheck on lipids is needed           Today's PHQ-2 Score:   PHQ-2 (  [employer]) 3/24/2020   Q1: Little interest or pleasure in doing things 0   Q2: Feeling down, depressed or hopeless 0   PHQ-2 Score 0   Q1: Little interest or pleasure in doing things Not at all   Q2: Feeling down, depressed or hopeless Not at all   PHQ-2 Score 0       Abuse: Current or Past(Physical, Sexual or Emotional)- No  Do you feel safe in your environment? Yes        Social History     Tobacco Use     Smoking status: Light Tobacco Smoker     Packs/day: 0.50     Years: 5.00     Pack years: 2.50     Types: Cigarettes     Start date: 1985     Last attempt to quit: 2008     Years since quittin.2     Smokeless tobacco: Never Used   Substance Use Topics     Alcohol use: Yes     Comment: rare         Alcohol Use 3/24/2020   Prescreen: >3 drinks/day or >7 drinks/week? No   Prescreen: >3 drinks/day or >7 drinks/week? -       Reviewed orders with patient.  Reviewed health maintenance and updated orders accordingly - Yes    G 1 P 1   No LMP recorded. Patient is postmenopausal.     Fasting: Yes    Td:tdap        Last 3 Pap and HPV Results:   PAP / HPV Latest Ref Rng & Units 3/21/2019 2016   PAP - NIL NIL   HPV 16 DNA NEG:Negative Negative -   HPV 18 DNA NEG:Negative Negative -   OTHER HR HPV NEG:Negative Positive(A) -                  Cholesterol:   Lab Results   Component Value Date     CHOL 277 2019     Lab Results   Component Value Date    HDL 37 2019     Lab Results   Component Value Date     2019     Lab Results   Component Value Date    TRIG 260 2019     Lab Results   Component Value Date    CHOLHDLRATIO 6.1 2009         MM/18 due soon  Dexa:  NA     Flex/colo:  FIT, due      Seat Belt: Yes    Sunscreen use: No  Calcium Intake: adeq  Health Care Directive: No  Sexually Active: Yes     Current contraception: none  History of abnormal Pap smear: No  Family history of colon/breast/ovarian cancer: No  Regular self breast exam: Yes  History of abnormal mammogram: No      Labs reviewed in EPIC  BP Readings from Last 3 Encounters:   20 118/78   20 136/72   19 118/70    Wt Readings from Last 3 Encounters:   20 70.4 kg (155 lb 3.2 oz)   19 68 kg (150 lb)   19 68.1 kg (150 lb 3.2 oz)                  Patient Active Problem List   Diagnosis     CARDIOVASCULAR SCREENING; LDL GOAL LESS THAN 160     Anaphylaxis, subsequent encounter     Right knee pain, unspecified chronicity     Cervical high risk HPV (human papillomavirus) test positive     Hypertension goal BP (blood pressure) < 140/90     Past Surgical History:   Procedure Laterality Date     NO HISTORY OF SURGERY         Social History     Tobacco Use     Smoking status: Light Tobacco Smoker     Packs/day: 0.50     Years: 5.00     Pack years: 2.50     Types: Cigarettes     Start date: 1985     Last attempt to quit: 2008     Years since quittin.2     Smokeless tobacco: Never Used   Substance Use Topics     Alcohol use: Yes     Comment: rare     Family History   Problem Relation Age of Onset     Heart Disease Father      Dementia Father      Diabetes Father      Neurologic Disorder Brother      Lipids Sister      Lipids Sister      Myocardial Infarction Mother      Hypertension Mother      Hyperlipidemia Brother          Current Outpatient Medications    Medication Sig Dispense Refill     lisinopril 10 MG PO tablet Take 1 tablet (10 mg) by mouth daily 90 tablet 0     ORDER FOR ALLERGEN IMMUNOTHERAPY Mixed Vespid Venom 300 mcg/mL HS 12 ml  Diluent: HSA qs to 5ml 12 mL prn     ORDER FOR ALLERGEN IMMUNOTHERAPY Wasp Venom 100 mcg/mL HS 12 ml  Diluent: HSA qs to 5ml 12 mL prn     EPINEPHrine (EPIPEN/ADRENACLICK/OR ANY BX GENERIC EQUIV) 0.3 MG/0.3ML injection 2-pack Inject 0.3 mLs (0.3 mg) into the muscle as needed for anaphylaxis 0.6 mL 11       Mammogram Screening: Patient over age 50, mutual decision to screen reflected in health maintenance.    Pertinent mammograms are reviewed under the imaging tab.  Reviewed and updated as needed this visit by clinical staff  Tobacco  Allergies  Meds  Problems  Med Hx  Surg Hx  Fam Hx  Soc Hx          Reviewed and updated as needed this visit by Provider  Tobacco  Allergies  Meds  Problems  Med Hx  Surg Hx  Fam Hx            Review of Systems   Constitutional: Negative for chills and fever.   HENT: Negative for congestion, ear pain, hearing loss and sore throat.    Eyes: Negative for pain and visual disturbance.   Respiratory: Negative for cough and shortness of breath.    Cardiovascular: Negative for chest pain, palpitations and peripheral edema.   Gastrointestinal: Negative for abdominal pain, constipation, diarrhea, heartburn, hematochezia and nausea.   Breasts:  Negative for tenderness, breast mass and discharge.   Genitourinary: Negative for dysuria, frequency, genital sores, hematuria, pelvic pain, urgency, vaginal bleeding and vaginal discharge.   Musculoskeletal: Negative for arthralgias, joint swelling and myalgias.   Skin: Negative for rash.   Neurological: Negative for dizziness, weakness, headaches and paresthesias.   Psychiatric/Behavioral: Negative for mood changes. The patient is not nervous/anxious.           OBJECTIVE:   /78   Pulse 79   Temp 97.8  F (36.6  C) (Oral)   Resp 20   Ht 1.638 m  "(5' 4.5\")   Wt 70.4 kg (155 lb 3.2 oz)   BMI 26.23 kg/m    Physical Exam  GENERAL APPEARANCE: healthy, alert and no distress  EYES: Eyes grossly normal to inspection, PERRL and conjunctivae and sclerae normal  HENT: ear canals and TM's normal, nose and mouth without ulcers or lesions, oropharynx clear and oral mucous membranes moist  NECK: no adenopathy, no asymmetry, masses, or scars and thyroid normal to palpation  RESP: lungs clear to auscultation - no rales, rhonchi or wheezes  BREAST: normal without masses, tenderness or nipple discharge and no palpable axillary masses or adenopathy  CV: regular rate and rhythm, normal S1 S2, no S3 or S4, no murmur, click or rub, no peripheral edema and peripheral pulses strong  ABDOMEN: soft, nontender, no hepatosplenomegaly, no masses and bowel sounds normal   (female): normal female external genitalia, normal urethral meatus, vaginal mucosal atrophy noted, normal cervix, adnexae, and uterus without masses or abnormal discharge  MS: no musculoskeletal defects are noted and gait is age appropriate without ataxia  SKIN: no suspicious lesions or rashes  NEURO: Normal strength and tone, sensory exam grossly normal, mentation intact and speech normal  PSYCH: mentation appears normal and affect normal/bright    Diagnostic Test Results:  Labs reviewed in Epic    ASSESSMENT/PLAN:   (Z00.00) Routine general medical examination at a health care facility  (primary encounter diagnosis)  Comment: preventive needs reviewed   Plan: Lipid panel reflex to direct LDL Fasting        see orders in Epic.     (R87.955) Cervical high risk HPV (human papillomavirus) test positive  Comment: due  Plan: HPV High Risk Types DNA Cervical, Pap imaged         thin layer diagnostic with HPV (select HPV         order below)  Encouraged to quit smoking            (I10) Hypertension goal BP (blood pressure) < 140/90  Comment: to goal  Plan: BASIC METABOLIC PANEL        Reviewed orthostatic symptoms, would " "adjust med dose if an issue  Ok to refill lisinopril until 9/2020 if requested    (Z12.11) Special screening for malignant neoplasms, colon  Comment: due  Plan: joce ordered    (Z12.31) Encounter for screening mammogram for breast cancer  Comment: due  Plan: *MA Screening Digital Bilateral              COUNSELING:  Reviewed preventive health counseling, as reflected in patient instructions  Special attention given to:        Regular exercise       Healthy diet/nutrition    Estimated body mass index is 26.23 kg/m  as calculated from the following:    Height as of this encounter: 1.638 m (5' 4.5\").    Weight as of this encounter: 70.4 kg (155 lb 3.2 oz).    Weight management plan: Discussed healthy diet and exercise guidelines     reports that she has been smoking cigarettes. She started smoking about 34 years ago. She has a 2.50 pack-year smoking history. She has never used smokeless tobacco.  Tobacco Cessation Action Plan: Information offered: Patient not interested at this time    Counseling Resources:  ATP IV Guidelines  Pooled Cohorts Equation Calculator  Breast Cancer Risk Calculator  FRAX Risk Assessment  ICSI Preventive Guidelines  Dietary Guidelines for Americans, 2010  USDA's MyPlate  ASA Prophylaxis  Lung CA Screening    Lauren Saez MD  [facility]  "

## 2020-03-11 NOTE — PATIENT INSTRUCTIONS
Continue all medications.        Preventive Health Recommendations  Female Ages 50 - 64    Yearly exam: See your health care provider every year in order to  o Review health changes.   o Discuss preventive care.    o Review your medicines if your doctor has prescribed any.      Get a Pap test every three years (unless you have an abnormal result and your provider advises testing more often).    If you get Pap tests with HPV test, you only need to test every 5 years, unless you have an abnormal result.     You do not need a Pap test if your uterus was removed (hysterectomy) and you have not had cancer.    You should be tested each year for STDs (sexually transmitted diseases) if you're at risk.     Have a mammogram every 1 to 2 years.    Have a colonoscopy at age 50, or have a yearly FIT test (stool test). These exams screen for colon cancer.      Have a cholesterol test every 5 years, or more often if advised.    Have a diabetes test (fasting glucose) every three years. If you are at risk for diabetes, you should have this test more often.     If you are at risk for osteoporosis (brittle bone disease), think about having a bone density scan (DEXA).    Shots: Get a flu shot each year. Get a tetanus shot every 10 years.    Nutrition:     Eat at least 5 servings of fruits and vegetables each day.    Eat whole-grain bread, whole-wheat pasta and brown rice instead of white grains and rice.    Get adequate Calcium and Vitamin D.     Lifestyle    Exercise at least 150 minutes a week (30 minutes a day, 5 days a week). This will help you control your weight and prevent disease.    Limit alcohol to one drink per day.    No smoking.     Wear sunscreen to prevent skin cancer.     See your dentist every six months for an exam and cleaning.    See your eye doctor every 1 to 2 years.

## 2020-03-24 ASSESSMENT — ENCOUNTER SYMPTOMS
DYSURIA: 0
ABDOMINAL PAIN: 0
COUGH: 0
CONSTIPATION: 0
DIARRHEA: 0
MYALGIAS: 0
EYE PAIN: 0
SHORTNESS OF BREATH: 0
NAUSEA: 0
WEAKNESS: 0
SORE THROAT: 0
DIZZINESS: 0
ARTHRALGIAS: 0
CHILLS: 0
HEMATURIA: 0
FREQUENCY: 0
HEADACHES: 0
JOINT SWELLING: 0
NERVOUS/ANXIOUS: 0
PALPITATIONS: 0
HEMATOCHEZIA: 0
HEARTBURN: 0
PARESTHESIAS: 0
FEVER: 0
BREAST MASS: 0

## 2020-03-25 ENCOUNTER — OFFICE VISIT (OUTPATIENT)
Dept: FAMILY MEDICINE | Facility: CLINIC | Age: 60
End: 2020-03-25
Payer: COMMERCIAL

## 2020-03-25 ENCOUNTER — TELEPHONE (OUTPATIENT)
Dept: ALLERGY | Facility: CLINIC | Age: 60
End: 2020-03-25

## 2020-03-25 VITALS
HEIGHT: 65 IN | HEART RATE: 79 BPM | WEIGHT: 155.2 LBS | RESPIRATION RATE: 20 BRPM | DIASTOLIC BLOOD PRESSURE: 78 MMHG | TEMPERATURE: 97.8 F | BODY MASS INDEX: 25.86 KG/M2 | SYSTOLIC BLOOD PRESSURE: 118 MMHG

## 2020-03-25 DIAGNOSIS — Z12.31 ENCOUNTER FOR SCREENING MAMMOGRAM FOR BREAST CANCER: ICD-10-CM

## 2020-03-25 DIAGNOSIS — R87.810 CERVICAL HIGH RISK HPV (HUMAN PAPILLOMAVIRUS) TEST POSITIVE: ICD-10-CM

## 2020-03-25 DIAGNOSIS — R73.01 ELEVATED FASTING BLOOD SUGAR: ICD-10-CM

## 2020-03-25 DIAGNOSIS — Z12.11 SPECIAL SCREENING FOR MALIGNANT NEOPLASMS, COLON: ICD-10-CM

## 2020-03-25 DIAGNOSIS — Z91.89 10 YEAR RISK OF MI OR STROKE 7.5% OR GREATER: ICD-10-CM

## 2020-03-25 DIAGNOSIS — I10 HYPERTENSION GOAL BP (BLOOD PRESSURE) < 140/90: ICD-10-CM

## 2020-03-25 DIAGNOSIS — Z00.00 ROUTINE GENERAL MEDICAL EXAMINATION AT A HEALTH CARE FACILITY: Primary | ICD-10-CM

## 2020-03-25 LAB
ANION GAP SERPL CALCULATED.3IONS-SCNC: 6 MMOL/L (ref 3–14)
BUN SERPL-MCNC: 12 MG/DL (ref 7–30)
CALCIUM SERPL-MCNC: 9.4 MG/DL (ref 8.5–10.1)
CHLORIDE SERPL-SCNC: 104 MMOL/L (ref 94–109)
CHOLEST SERPL-MCNC: 313 MG/DL
CO2 SERPL-SCNC: 26 MMOL/L (ref 20–32)
CREAT SERPL-MCNC: 0.84 MG/DL (ref 0.52–1.04)
GFR SERPL CREATININE-BSD FRML MDRD: 77 ML/MIN/{1.73_M2}
GLUCOSE SERPL-MCNC: 108 MG/DL (ref 70–99)
HDLC SERPL-MCNC: 45 MG/DL
LDLC SERPL CALC-MCNC: 219 MG/DL
NONHDLC SERPL-MCNC: 268 MG/DL
POTASSIUM SERPL-SCNC: 4.2 MMOL/L (ref 3.4–5.3)
SODIUM SERPL-SCNC: 136 MMOL/L (ref 133–144)
TRIGL SERPL-MCNC: 247 MG/DL

## 2020-03-25 PROCEDURE — 88175 CYTOPATH C/V AUTO FLUID REDO: CPT | Performed by: FAMILY MEDICINE

## 2020-03-25 PROCEDURE — 36415 COLL VENOUS BLD VENIPUNCTURE: CPT | Performed by: FAMILY MEDICINE

## 2020-03-25 PROCEDURE — 99213 OFFICE O/P EST LOW 20 MIN: CPT | Mod: 25 | Performed by: FAMILY MEDICINE

## 2020-03-25 PROCEDURE — 87624 HPV HI-RISK TYP POOLED RSLT: CPT | Performed by: FAMILY MEDICINE

## 2020-03-25 PROCEDURE — 80061 LIPID PANEL: CPT | Performed by: FAMILY MEDICINE

## 2020-03-25 PROCEDURE — 80048 BASIC METABOLIC PNL TOTAL CA: CPT | Performed by: FAMILY MEDICINE

## 2020-03-25 PROCEDURE — 99396 PREV VISIT EST AGE 40-64: CPT | Performed by: FAMILY MEDICINE

## 2020-03-25 RX ORDER — ATORVASTATIN CALCIUM 10 MG/1
10 TABLET, FILM COATED ORAL DAILY
Qty: 90 TABLET | Refills: 0 | Status: SHIPPED | OUTPATIENT
Start: 2020-03-25 | End: 2020-05-12

## 2020-03-25 ASSESSMENT — MIFFLIN-ST. JEOR: SCORE: 1281.92

## 2020-03-25 NOTE — NURSING NOTE
"Chief Complaint   Patient presents with     Physical       Initial /78   Pulse 79   Temp 97.8  F (36.6  C) (Oral)   Resp 20   Ht 1.638 m (5' 4.5\")   Wt 70.4 kg (155 lb 3.2 oz)   BMI 26.23 kg/m   Estimated body mass index is 26.23 kg/m  as calculated from the following:    Height as of this encounter: 1.638 m (5' 4.5\").    Weight as of this encounter: 70.4 kg (155 lb 3.2 oz).  Medication Reconciliation: complete  Mik Roberson CMA    "

## 2020-03-25 NOTE — TELEPHONE ENCOUNTER
Returned call to patient and notified her that workflow was not in place yet and she would be contacted as soon as she could be scheduled. Latha Ding RN

## 2020-03-25 NOTE — TELEPHONE ENCOUNTER
Patient states that someone was going to call her and re-schedule her bee venom therapy at [facility] and she has not heard anything yet.  Please call.    Thank you.

## 2020-03-27 LAB
COPATH REPORT: NORMAL
PAP: NORMAL

## 2020-03-31 LAB
FINAL DIAGNOSIS: NORMAL
HPV HR 12 DNA CVX QL NAA+PROBE: NEGATIVE
HPV16 DNA SPEC QL NAA+PROBE: NEGATIVE
HPV18 DNA SPEC QL NAA+PROBE: NEGATIVE
SPECIMEN DESCRIPTION: NORMAL
SPECIMEN SOURCE CVX/VAG CYTO: NORMAL

## 2020-04-01 NOTE — TELEPHONE ENCOUNTER
Notified patient that the [facility] will be contacting her to schedule soon, and thanked her for her patience.    Cynthia Dubose RN

## 2020-04-09 ENCOUNTER — COMMUNICATION - HEALTHEAST (OUTPATIENT)
Dept: TELEHEALTH | Facility: CLINIC | Age: 60
End: 2020-04-09

## 2020-04-09 ENCOUNTER — AMBULATORY - HEALTHEAST (OUTPATIENT)
Dept: ALLERGY | Facility: CLINIC | Age: 60
End: 2020-04-09

## 2020-04-09 DIAGNOSIS — Z91.030 ALLERGY TO BEE STING: ICD-10-CM

## 2020-04-10 ENCOUNTER — TELEPHONE (OUTPATIENT)
Dept: ALLERGY | Facility: CLINIC | Age: 60
End: 2020-04-10

## 2020-04-10 DIAGNOSIS — T78.2XXD ANAPHYLAXIS DUE TO HYMENOPTERA VENOM, ACCIDENTAL OR UNINTENTIONAL, SUBSEQUENT ENCOUNTER: ICD-10-CM

## 2020-04-10 DIAGNOSIS — T63.481D ANAPHYLAXIS DUE TO HYMENOPTERA VENOM, ACCIDENTAL OR UNINTENTIONAL, SUBSEQUENT ENCOUNTER: ICD-10-CM

## 2020-04-10 NOTE — TELEPHONE ENCOUNTER
ALLERGY SOLUTION RE-ORDER REQUEST    Kaleigh Tyson 1960 MRN: [id number]    DATE NEEDED:  5/8/20  Vial Color Content   Top Dose       Last Dose     Vial Size  Red 1:1 Wasp   Red 1:1 1   Red 1:11 10ml  Red 1:1 Mixed Vespid   Red 1:1 1   Red 1:11 10ml      Serum reorder consent signed and patient/parent was advised that new serums would be ordered through the pharmacy and billed to their insurance company when they arrive in clinic. Yes    Shot Clinic Location:  [place]  Ship to Location: [facility]  Serum billed to:  [place]    Special Instructions:  Please ship to [facility]      Updated Prescription Needed: No      Requester Signature  Cynthia Dubose RN

## 2020-05-06 DIAGNOSIS — R73.01 ELEVATED FASTING BLOOD SUGAR: ICD-10-CM

## 2020-05-06 DIAGNOSIS — R73.03 PREDIABETES: Primary | ICD-10-CM

## 2020-05-06 DIAGNOSIS — Z91.89 10 YEAR RISK OF MI OR STROKE 7.5% OR GREATER: ICD-10-CM

## 2020-05-06 LAB
CHOLEST SERPL-MCNC: 192 MG/DL
HBA1C MFR BLD: 5.9 % (ref 0–5.6)
HDLC SERPL-MCNC: 46 MG/DL
LDLC SERPL CALC-MCNC: 98 MG/DL
NONHDLC SERPL-MCNC: 146 MG/DL
TRIGL SERPL-MCNC: 240 MG/DL

## 2020-05-06 PROCEDURE — 83036 HEMOGLOBIN GLYCOSYLATED A1C: CPT | Performed by: FAMILY MEDICINE

## 2020-05-06 PROCEDURE — 80061 LIPID PANEL: CPT | Performed by: FAMILY MEDICINE

## 2020-05-06 PROCEDURE — 36415 COLL VENOUS BLD VENIPUNCTURE: CPT | Performed by: FAMILY MEDICINE

## 2020-05-11 DIAGNOSIS — I10 HYPERTENSION GOAL BP (BLOOD PRESSURE) < 140/90: ICD-10-CM

## 2020-05-11 DIAGNOSIS — Z91.89 10 YEAR RISK OF MI OR STROKE 7.5% OR GREATER: ICD-10-CM

## 2020-05-12 RX ORDER — LISINOPRIL 10 MG/1
TABLET ORAL
Qty: 90 TABLET | Refills: 0 | Status: SHIPPED | OUTPATIENT
Start: 2020-05-12 | End: 2020-09-02

## 2020-05-12 RX ORDER — ATORVASTATIN CALCIUM 10 MG/1
TABLET, FILM COATED ORAL
Qty: 90 TABLET | Refills: 0 | Status: SHIPPED | OUTPATIENT
Start: 2020-05-12 | End: 2020-06-10

## 2020-05-12 NOTE — TELEPHONE ENCOUNTER
Allergy serums received at [facility].     Vials received below:    Vial Color Content                      Vial Size Expiration Date  Red 1:1 Wasp 13mL  4/20/2021  Red 1:1 Mixed Vespid 13mL  4/20/2021      Signature  Latha Ding RN

## 2020-05-14 ENCOUNTER — TELEPHONE (OUTPATIENT)
Dept: ALLERGY | Facility: OTHER | Age: 60
End: 2020-05-14

## 2020-05-14 ENCOUNTER — TELEPHONE (OUTPATIENT)
Dept: FAMILY MEDICINE | Facility: CLINIC | Age: 60
End: 2020-05-14

## 2020-05-14 NOTE — TELEPHONE ENCOUNTER
Diabetes Education Scheduling Outreach #1:    Call to patient to schedule. Left message with phone number to call to schedule.    Plan for 2nd outreach attempt within 1 week.    Mouna Domingo  [facility]  Diabetes and Nutrition Scheduling

## 2020-05-14 NOTE — TELEPHONE ENCOUNTER
Left message for patient to return call to clinic to schedule next venom injections.  Brianda Miramontes MA

## 2020-05-28 ENCOUNTER — ALLIED HEALTH/NURSE VISIT (OUTPATIENT)
Dept: ALLERGY | Facility: CLINIC | Age: 60
End: 2020-05-28
Payer: COMMERCIAL

## 2020-05-28 DIAGNOSIS — T63.441D TOXIC EFFECT OF VENOM OF BEES, UNINTENTIONAL, SUBSEQUENT ENCOUNTER: Primary | ICD-10-CM

## 2020-05-28 PROCEDURE — 95117 IMMUNOTHERAPY INJECTIONS: CPT

## 2020-05-28 PROCEDURE — 99207 ZZC DROP WITH A PROCEDURE: CPT

## 2020-05-28 NOTE — PROGRESS NOTES
Patient presented after waiting 30 minutes with no reaction to allergy injections. Discharged from clinic.    Latha Ding RN, RN ............   5/28/2020...9:54 AM

## 2020-06-08 DIAGNOSIS — Z91.89 10 YEAR RISK OF MI OR STROKE 7.5% OR GREATER: ICD-10-CM

## 2020-06-10 RX ORDER — ATORVASTATIN CALCIUM 10 MG/1
TABLET, FILM COATED ORAL
Qty: 90 TABLET | Refills: 3 | Status: SHIPPED | OUTPATIENT
Start: 2020-06-10 | End: 2020-09-02

## 2020-06-11 ENCOUNTER — ALLIED HEALTH/NURSE VISIT (OUTPATIENT)
Dept: ALLERGY | Facility: CLINIC | Age: 60
End: 2020-06-11
Payer: COMMERCIAL

## 2020-06-11 DIAGNOSIS — T63.441D TOXIC EFFECT OF VENOM OF BEES, UNINTENTIONAL, SUBSEQUENT ENCOUNTER: Primary | ICD-10-CM

## 2020-06-11 PROCEDURE — 95117 IMMUNOTHERAPY INJECTIONS: CPT

## 2020-06-11 PROCEDURE — 99207 ZZC DROP WITH A PROCEDURE: CPT

## 2020-06-11 NOTE — PROGRESS NOTES
Patient presented after waiting 30 minutes with no reaction to allergy injections. Discharged from clinic.    Latha Ding RN, RN ............   6/11/2020...9:33 AM

## 2020-06-25 ENCOUNTER — ALLIED HEALTH/NURSE VISIT (OUTPATIENT)
Dept: ALLERGY | Facility: CLINIC | Age: 60
End: 2020-06-25
Payer: COMMERCIAL

## 2020-06-25 DIAGNOSIS — T63.441D TOXIC EFFECT OF VENOM OF BEES, UNINTENTIONAL, SUBSEQUENT ENCOUNTER: Primary | ICD-10-CM

## 2020-06-25 PROCEDURE — 95117 IMMUNOTHERAPY INJECTIONS: CPT

## 2020-06-25 PROCEDURE — 99207 ZZC DROP WITH A PROCEDURE: CPT

## 2020-06-25 NOTE — PROGRESS NOTES
Patient presented after waiting 30 minutes with no reaction to allergy injections. Discharged from clinic.    Latha Ding RN, RN ............   6/25/2020...10:56 AM

## 2020-07-03 NOTE — TELEPHONE ENCOUNTER
Diabetes Education Scheduling Outreach #2:    Call to patient to schedule. Left message with phone number to call to schedule.    Mouna Domingo  [facility]  Diabetes and Nutrition Scheduling

## 2020-08-04 ENCOUNTER — TELEPHONE (OUTPATIENT)
Dept: ALLERGY | Facility: CLINIC | Age: 60
End: 2020-08-04

## 2020-08-04 NOTE — TELEPHONE ENCOUNTER
Patient is due for yearly follow up with Dr. Estes. Called and spoke with patient who stated that she is currently without insurance due to 's job change. She will have insurance in October again and will schedule at that time. Latha Ding RN

## 2020-08-13 ENCOUNTER — ALLIED HEALTH/NURSE VISIT (OUTPATIENT)
Dept: ALLERGY | Facility: CLINIC | Age: 60
End: 2020-08-13

## 2020-08-13 DIAGNOSIS — T63.441D TOXIC EFFECT OF VENOM OF BEES, UNINTENTIONAL, SUBSEQUENT ENCOUNTER: Primary | ICD-10-CM

## 2020-08-13 PROCEDURE — 95117 IMMUNOTHERAPY INJECTIONS: CPT

## 2020-08-13 PROCEDURE — 99207 ZZC DROP WITH A PROCEDURE: CPT

## 2020-08-13 NOTE — PROGRESS NOTES
Patient presented after waiting 30 minutes with no reaction to allergy injections. Discharged from clinic.    Latha Ding RN, RN ............   8/13/2020...2:39 PM

## 2020-09-01 ENCOUNTER — TELEPHONE (OUTPATIENT)
Dept: FAMILY MEDICINE | Facility: CLINIC | Age: 60
End: 2020-09-01

## 2020-09-01 DIAGNOSIS — Z91.89 10 YEAR RISK OF MI OR STROKE 7.5% OR GREATER: ICD-10-CM

## 2020-09-01 DIAGNOSIS — I10 HYPERTENSION GOAL BP (BLOOD PRESSURE) < 140/90: ICD-10-CM

## 2020-09-01 NOTE — TELEPHONE ENCOUNTER
Per 3/25/20 provider notes, patient due now for BP OV with PCP.  No appointment pending at this time.  Routing to provider to advise.    Evelyn Balderas BSN, RN

## 2020-09-02 ENCOUNTER — MYC MEDICAL ADVICE (OUTPATIENT)
Dept: FAMILY MEDICINE | Facility: CLINIC | Age: 60
End: 2020-09-02

## 2020-09-02 DIAGNOSIS — I10 HYPERTENSION GOAL BP (BLOOD PRESSURE) < 140/90: ICD-10-CM

## 2020-09-02 DIAGNOSIS — Z91.89 10 YEAR RISK OF MI OR STROKE 7.5% OR GREATER: ICD-10-CM

## 2020-09-02 RX ORDER — LISINOPRIL 10 MG/1
10 TABLET ORAL DAILY
Qty: 90 TABLET | Refills: 0 | Status: SHIPPED | OUTPATIENT
Start: 2020-09-02 | End: 2020-11-23

## 2020-09-02 RX ORDER — ATORVASTATIN CALCIUM 10 MG/1
10 TABLET, FILM COATED ORAL DAILY
Qty: 90 TABLET | Refills: 0 | Status: SHIPPED | OUTPATIENT
Start: 2020-09-02 | End: 2020-11-23

## 2020-09-02 NOTE — TELEPHONE ENCOUNTER
Patient needs refill on Lisinopril (ZESTRIL) and Atorvastatin (LIPITOR), pt has made an appointment on 10/12/20 as requested. See message below from patient.  JOSE ALEJANDRO Chakraborty

## 2020-09-02 NOTE — TELEPHONE ENCOUNTER
Patient is calling stating that she gets new insurance on  10/1/2020.  So She scheduled an appointment for 10/12/2020.  She will be out of her medication on 9/14/2020.  Please send over to pharmacy. Please call or mychart patient to let her know that they are sent over.  Thank you

## 2020-09-03 ENCOUNTER — ALLIED HEALTH/NURSE VISIT (OUTPATIENT)
Dept: FAMILY MEDICINE | Facility: CLINIC | Age: 60
End: 2020-09-03

## 2020-09-03 VITALS — SYSTOLIC BLOOD PRESSURE: 134 MMHG | HEART RATE: 66 BPM | DIASTOLIC BLOOD PRESSURE: 80 MMHG

## 2020-09-03 DIAGNOSIS — Z01.30 BLOOD PRESSURE CHECK: Primary | ICD-10-CM

## 2020-09-03 PROCEDURE — 99207 ZZC NO CHARGE LOS: CPT | Performed by: FAMILY MEDICINE

## 2020-09-03 RX ORDER — LISINOPRIL 10 MG/1
TABLET ORAL
Qty: 90 TABLET | Refills: 0 | OUTPATIENT
Start: 2020-09-03

## 2020-09-03 RX ORDER — ATORVASTATIN CALCIUM 10 MG/1
TABLET, FILM COATED ORAL
Qty: 90 TABLET | Refills: 2 | OUTPATIENT
Start: 2020-09-03

## 2020-09-03 NOTE — PROGRESS NOTES
Kaleigh Tyson was evaluated at [facility] on September 3, 2020 at which time her blood pressure was:    BP Readings from Last 3 Encounters:   09/03/20 134/80   03/25/20 118/78   02/11/20 136/72     Pulse Readings from Last 3 Encounters:   09/03/20 66   03/25/20 79   02/11/20 68       Reviewed lifestyle modifications for blood pressure control and reduction: including making healthy food choices, managing weight, getting regular exercise, smoking cessation, reducing alcohol consumption, monitoring blood pressure regularly.     Symptoms: None    BP Goal:< 140/90 mmHg    BP Assessment:  BP at goal    Potential Reasons for BP too high: NA - Not applicable    BP Follow-Up Plan: Recheck BP in 6 months at pharmacy    Recommendation to Provider: None    Note completed by: Jose Parikh RPh.  [facility]  [phone]

## 2020-10-05 DIAGNOSIS — T78.2XXD ANAPHYLACTIC REACTION TO BEE STING, ACCIDENTAL OR UNINTENTIONAL, SUBSEQUENT ENCOUNTER: ICD-10-CM

## 2020-10-05 DIAGNOSIS — T63.441D ANAPHYLACTIC REACTION TO BEE STING, ACCIDENTAL OR UNINTENTIONAL, SUBSEQUENT ENCOUNTER: ICD-10-CM

## 2020-10-05 RX ORDER — EPINEPHRINE 0.3 MG/.3ML
0.3 INJECTION SUBCUTANEOUS PRN
Qty: 0.6 ML | Refills: 11 | Status: CANCELLED | OUTPATIENT
Start: 2020-10-05

## 2020-10-05 NOTE — PROGRESS NOTES
Subjective     Kaleigh Tyson is a 60 year old female who presents to clinic today for the following health issues:    History of Present Illness       Hyperlipidemia:  She presents for follow up of hyperlipidemia.  She is taking medication to lower cholesterol.     She eats 2-3 servings of fruits and vegetables daily.She consumes 1 sweetened beverage(s) daily.She exercises with enough effort to increase her heart rate 60 or more minutes per day.  She exercises with enough effort to increase her heart rate 5 days per week.   She is taking medications regularly.             Hypertension ROS: taking medications as instructed, no medication side effects noted, no TIA's, no chest pain on exertion, no dyspnea on exertion, no swelling of ankles.  No headache or visual changes.    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   Taking meds regularly    Has not received cologuard.        Review of Systems   Constitutional, HEENT, cardiovascular, pulmonary, gi and gu systems are negative, except as otherwise noted.      Objective    /84   Pulse 69   Temp 97  F (36.1  C) (Tympanic)   Wt 71.2 kg (157 lb)   SpO2 97%   BMI 26.95 kg/m    Body mass index is 26.95 kg/m .  Physical Exam   GENERAL: healthy, alert and no distress  EYES: Eyes grossly normal to inspection, PERRL and conjunctivae and sclerae normal  RESP: lungs clear to auscultation - no rales, rhonchi or wheezes  CV: regular rate and rhythm, normal S1 S2, no S3 or S4, no murmur, click or rub, no peripheral edema and peripheral pulses strong  MS: no gross musculoskeletal defects noted, no edema  SKIN: no suspicious lesions or rashes  PSYCH: mentation appears normal, affect normal/bright            Assessment & Plan     (I10) Hypertension goal BP (blood pressure) < 140/90  (primary encounter diagnosis)  Comment: to goal  Plan: recent refill, ok to refill x 90 days, or until 4/2021 when due for preventive    (R73.03) Prediabetes  Comment:  "due for recheck  Plan: BASIC METABOLIC PANEL, **A1C FUTURE anytime            (E78.5) Hyperlipidemia LDL goal <130  Comment: stable  Plan: recent refill, ok to refill x 90 days, or until 4/2021 when due for preventive    (Z12.11) Screening for colon cancer  Comment: due  Plan: cologuajanae submitted.        BMI:   Estimated body mass index is 26.95 kg/m  as calculated from the following:    Height as of 10/8/20: 1.626 m (5' 4\").    Weight as of this encounter: 71.2 kg (157 lb).   Weight management plan: Discussed healthy diet and exercise guidelines         Patient Instructions       Continue all medications.            Return in about 6 months (around 4/12/2021) for Preventive exam, Fasting Lab Work.    Lauren Saez MD  [facility]    "

## 2020-10-08 ENCOUNTER — ALLIED HEALTH/NURSE VISIT (OUTPATIENT)
Dept: ALLERGY | Facility: CLINIC | Age: 60
End: 2020-10-08
Payer: COMMERCIAL

## 2020-10-08 ENCOUNTER — OFFICE VISIT (OUTPATIENT)
Dept: ALLERGY | Facility: CLINIC | Age: 60
End: 2020-10-08
Payer: COMMERCIAL

## 2020-10-08 ENCOUNTER — TELEPHONE (OUTPATIENT)
Dept: ALLERGY | Facility: OTHER | Age: 60
End: 2020-10-08

## 2020-10-08 VITALS
HEART RATE: 58 BPM | HEIGHT: 64 IN | TEMPERATURE: 97.3 F | OXYGEN SATURATION: 100 % | BODY MASS INDEX: 26.46 KG/M2 | WEIGHT: 155 LBS

## 2020-10-08 DIAGNOSIS — T78.2XXD ANAPHYLACTIC REACTION TO BEE STING, ACCIDENTAL OR UNINTENTIONAL, SUBSEQUENT ENCOUNTER: ICD-10-CM

## 2020-10-08 DIAGNOSIS — T63.441D TOXIC EFFECT OF VENOM OF BEES, UNINTENTIONAL, SUBSEQUENT ENCOUNTER: Primary | ICD-10-CM

## 2020-10-08 DIAGNOSIS — T78.2XXD ANAPHYLAXIS, SUBSEQUENT ENCOUNTER: ICD-10-CM

## 2020-10-08 DIAGNOSIS — T63.441D ANAPHYLACTIC REACTION TO BEE STING, ACCIDENTAL OR UNINTENTIONAL, SUBSEQUENT ENCOUNTER: ICD-10-CM

## 2020-10-08 PROCEDURE — 99207 PR DROP WITH A PROCEDURE: CPT

## 2020-10-08 PROCEDURE — 95117 IMMUNOTHERAPY INJECTIONS: CPT

## 2020-10-08 PROCEDURE — 99213 OFFICE O/P EST LOW 20 MIN: CPT | Mod: 25 | Performed by: ALLERGY & IMMUNOLOGY

## 2020-10-08 RX ORDER — EPINEPHRINE 0.3 MG/.3ML
0.3 INJECTION SUBCUTANEOUS PRN
Qty: 0.6 ML | Refills: 11 | Status: SHIPPED | OUTPATIENT
Start: 2020-10-08 | End: 2021-10-20

## 2020-10-08 ASSESSMENT — MIFFLIN-ST. JEOR: SCORE: 1268.08

## 2020-10-08 NOTE — PATIENT INSTRUCTIONS
Allergy Staff Appt Hours Shot Hours Locations    Physician     [place] sEtes DO       Support Staff     FAVIAN Pedroza, LENA  Tuesday:        [place] 7-4:20     Wednesday:        [place]: 7-5     Thursday:                    [place] 7-6:40     Friday:  [place]  7-2:40   [place]        Thursday: 1-5:50        Friday: 7-10:50     [place]        Tuesday: 7- 3:20        Wednesday: 7-4:20     [place] Monday: 7-4:20        Tuesday: 1-6:20         [facility]  [address] [place] [address]  Appt Line: [phone]  Allergy RN:  [phone]    [facility]  [address] [place] [address]  Appt Line: [phone]  Allergy RN:  [phone]       Important Scheduling Information  Aspirin Desensitization: Appt will last 2 clinic days. Please call the Allergy RN line for your clinic to schedule. Discontinue antihistamines 7 days prior to the appointment.     Food Challenges: Appt will last 3-4 hours. Please call the Allergy RN line for your clinic to schedule. Discontinue antihistamines 7 days prior to the appointment.     Penicillin Testing: Appt will last 2-3 hours. Please call the Allergy RN line for your clinic to schedule. Discontinue antihistamines 7 days prior to the appointment.     Skin Testing: Appt will about 40 minutes. Call the appointment line for your clinic to schedule. Discontinue antihistamines 7 days prior to the appointment.     Venom Testing: Appt will last 2-3 hours. Please call the Allergy RN line for your clinic to schedule. Discontinue antihistamines 7 days prior to the appointment.     Thank you for trusting us with your Allergy, Asthma, and Immunology care. Please feel free to contact us with any questions or concerns you may have.      - Continue venom allergy shots.   - Continue to avoid venom.

## 2020-10-08 NOTE — LETTER
[facility]                   FOOD ALLERGY & ANAPHYLAXIS EMERGENCY CARE PLAN  Food Allergy Research & Education         Name: Kaleigh OLAF HILLO.B.:  [id number]    Allergy to: Venom  Weight: 155 lbs 0 oz lbs.  Asthma:  No    -NOTE: Do not depend on antihistamines or inhalers (bronchodilators) to treat a severe reaction. USE EPINEPHRINE.     MEDICATIONS/DOSES  Epinephrine Brand: EpiPen  Epinephrine Dose: 0.3 mg IM  Antihistamine Brand or Generic: Zyrtec (Cetirizine)  Antihistamine Dose: 10mg         FARE                   FOOD ALLERGY & ANAPHYLAXIS EMERGENCY CARE PLAN   Food Allergy Research & Education           EMERGENCY CONTACTS - CALL 911  DOCTOR:  [place] Estes DO   PHONE: [phone]  PARENT/GUARDIAN:              PHONE:  OTHER EMERGENCY CONTACTS  NAME/RELATIONSHIP:   PHONE:   NAME/RELATIONSHIP:    PHONE:           PARENT/GUARDIAN AUTHORIZATION SIGNATURE     DATE              PHYSICIAN/H CP AUTHORIZATION SIGNATURE         DATE  FORM PROVIDED [employer] FOOD ALLERGY RESEARCH & EDUCATION (FARE) ([email or web address]) 2014

## 2020-10-08 NOTE — PROGRESS NOTES
Patient presented after waiting 30 minutes with no reaction to allergy injections. Discharged from clinic.     Dr. AGUSTIN Estes MD.

## 2020-10-08 NOTE — PROGRESS NOTES
Kaleigh Tyson is a 60 year old White female with previous medical history significant for venom allergy who returns for a follow up visit.     Patient returns for follow-up.  She has a history of venom allergy.  She has had 2 significant anaphylactic episodes after insect stings in 2016 and 2018.  She has been on venom immunotherapy since 2018.  No interval stings.  She has tolerated venom immunotherapy.    Past Medical History:   Diagnosis Date     Cervical high risk HPV (human papillomavirus) test positive 03/21/2019    see problem list     Elevated cholesterol     Low HDL - 41 in 1/09     Hypertension      Family History   Problem Relation Age of Onset     Heart Disease Father      Dementia Father      Diabetes Father      Neurologic Disorder Brother      Lipids Sister      Lipids Sister      Myocardial Infarction Mother      Hypertension Mother      Hyperlipidemia Brother      Past Surgical History:   Procedure Laterality Date     NO HISTORY OF SURGERY         REVIEW OF SYSTEMS:  General: negative for weight gain. negative for weight loss. negative for changes in sleep.   Ears: negative for fullness. negative for hearing loss. negative for dizziness.   Nose: negative for snoring.negative for changes in smell. negative for drainage.   Eyes: negative for eye watering. negative for eye itching. negative for vision changes. negative for eye redness.  Throat: negative for hoarseness. negative for sore throat. negative for trouble swallowing.   Lungs: negative for shortness of breath.negative for wheezing. negative for sputum production.   Cardiovascular: negative for chest pain. negative for swelling of ankles. negative for fast or irregular heartbeat.   Gastrointestinal: negative for nausea. negative for heartburn. negative for acid reflux.   Musculoskeletal: negative for joint pain. negative for joint stiffness. negative for joint swelling.   Neurologic: negative for seizures. negative for fainting. negative for  weakness.   Psychiatric: negative for changes in mood. negative for anxiety.   Endocrine: negative for cold intolerance. negative for heat intolerance. negative for tremors.   Lymphatic: negative for lower extremity swelling. negative for lymph node swelling.   Hematologic: negative for easy bruising. negative for easy bleeding.  Integumentary: negative for rash. negative for scaling. negative for nail changes.       Current Outpatient Medications:      atorvastatin (LIPITOR) 10 MG tablet, Take 1 tablet (10 mg) by mouth daily, Disp: 90 tablet, Rfl: 0     EPINEPHrine (ANY BX GENERIC EQUIV) 0.3 MG/0.3ML injection 2-pack, Inject 0.3 mLs (0.3 mg) into the muscle as needed for anaphylaxis, Disp: 0.6 mL, Rfl: 11     lisinopril (ZESTRIL) 10 MG tablet, Take 1 tablet (10 mg) by mouth daily, Disp: 90 tablet, Rfl: 0     ORDER FOR ALLERGEN IMMUNOTHERAPY, Mixed Vespid Venom 300 mcg/mL HS 12 ml Diluent: HSA qs to 5ml, Disp: 12 mL, Rfl: prn     ORDER FOR ALLERGEN IMMUNOTHERAPY, Wasp Venom 100 mcg/mL HS 12 ml Diluent: HSA qs to 5ml, Disp: 12 mL, Rfl: prn  Immunization History   Administered Date(s) Administered     HepB-Adult 02/22/2019     Mantoux Tuberculin Skin Test 02/22/2019     TD (ADULT, 7+) 06/16/2004     TDAP Vaccine (Adacel) 08/02/2016     Zoster vaccine recombinant adjuvanted (SHINGRIX) 05/14/2019, 08/07/2019     Allergies   Allergen Reactions     Bees Hives and Swelling     Pyridoxine Hives     Other reaction(s): Angioedema         EXAM:   Constitutional:  Appears well-developed and well-nourished. No distress.   HEENT:   Head: Normocephalic.   Nasal tissue pink and normal appearing.  No rhinorrhea noted.    Eyes: Conjunctivae are non-erythematous   Cardiovascular: Normal rate, regular rhythm and normal heart sounds. Exam reveals no gallop and no friction rub.   No murmur heard.  Respiratory: Effort normal and breath sounds normal. No respiratory distress. No wheezes. No rales.   Musculoskeletal: Normal range of motion.    Neuro: Oriented to person, place, and time.  Skin: Skin is warm and dry. No rash noted.   Psychiatric: Normal mood and affect.     Nursing note and vitals reviewed.    ASSESSMENT/PLAN:  Problem List Items Addressed This Visit        Immune    Anaphylaxis, subsequent encounter     History of anaphylaxis in 2016 after insect sting. Anaphylaxis in 2018 thought related to insect sting.   On venom allergy shots for mixed vespid and wasp.  Tolerating.  No intervals stings.  Normal serum tryptase.     -Continue venom immunotherapy.  Continue to carry injectable epinephrine.  Updated anaphylaxis action plan was provided.  -Continue to avoid flying stinging insects.              Return in 1 year or sooner if needed.    Chart documentation with Dragon Voice recognition Software. Although reviewed after completion, some words and grammatical errors may remain.    Modesto Estes DO FAAAAI  Medical Director for Allergy/Immunology at [place], [place]

## 2020-10-08 NOTE — LETTER
10/8/2020         RE: Kaleigh Tyson  [address] [facility] [address]        Dear Colleague,    Thank you for referring your patient, Kaleigh Tyson, to the [facility]. Please see a copy of my visit note below.    Kaleigh Tyson is a 60 year old White female with previous medical history significant for venom allergy who returns for a follow up visit.     Patient returns for follow-up.  She has a history of venom allergy.  She has had 2 significant anaphylactic episodes after insect stings in 2016 and 2018.  She has been on venom immunotherapy since 2018.  No interval stings.  She has tolerated venom immunotherapy.    Past Medical History:   Diagnosis Date     Cervical high risk HPV (human papillomavirus) test positive 03/21/2019    see problem list     Elevated cholesterol     Low HDL - 41 in 1/09     Hypertension      Family History   Problem Relation Age of Onset     Heart Disease Father      Dementia Father      Diabetes Father      Neurologic Disorder Brother      Lipids Sister      Lipids Sister      Myocardial Infarction Mother      Hypertension Mother      Hyperlipidemia Brother      Past Surgical History:   Procedure Laterality Date     NO HISTORY OF SURGERY         REVIEW OF SYSTEMS:  General: negative for weight gain. negative for weight loss. negative for changes in sleep.   Ears: negative for fullness. negative for hearing loss. negative for dizziness.   Nose: negative for snoring.negative for changes in smell. negative for drainage.   Eyes: negative for eye watering. negative for eye itching. negative for vision changes. negative for eye redness.  Throat: negative for hoarseness. negative for sore throat. negative for trouble swallowing.   Lungs: negative for shortness of breath.negative for wheezing. negative for sputum production.   Cardiovascular: negative for chest pain. negative for swelling of ankles. negative for fast or irregular heartbeat.   Gastrointestinal:  negative for nausea. negative for heartburn. negative for acid reflux.   Musculoskeletal: negative for joint pain. negative for joint stiffness. negative for joint swelling.   Neurologic: negative for seizures. negative for fainting. negative for weakness.   Psychiatric: negative for changes in mood. negative for anxiety.   Endocrine: negative for cold intolerance. negative for heat intolerance. negative for tremors.   Lymphatic: negative for lower extremity swelling. negative for lymph node swelling.   Hematologic: negative for easy bruising. negative for easy bleeding.  Integumentary: negative for rash. negative for scaling. negative for nail changes.       Current Outpatient Medications:      atorvastatin (LIPITOR) 10 MG tablet, Take 1 tablet (10 mg) by mouth daily, Disp: 90 tablet, Rfl: 0     EPINEPHrine (ANY BX GENERIC EQUIV) 0.3 MG/0.3ML injection 2-pack, Inject 0.3 mLs (0.3 mg) into the muscle as needed for anaphylaxis, Disp: 0.6 mL, Rfl: 11     lisinopril (ZESTRIL) 10 MG tablet, Take 1 tablet (10 mg) by mouth daily, Disp: 90 tablet, Rfl: 0     ORDER FOR ALLERGEN IMMUNOTHERAPY, Mixed Vespid Venom 300 mcg/mL HS 12 ml Diluent: HSA qs to 5ml, Disp: 12 mL, Rfl: prn     ORDER FOR ALLERGEN IMMUNOTHERAPY, Wasp Venom 100 mcg/mL HS 12 ml Diluent: HSA qs to 5ml, Disp: 12 mL, Rfl: prn  Immunization History   Administered Date(s) Administered     HepB-Adult 02/22/2019     Mantoux Tuberculin Skin Test 02/22/2019     TD (ADULT, 7+) 06/16/2004     TDAP Vaccine (Adacel) 08/02/2016     Zoster vaccine recombinant adjuvanted (SHINGRIX) 05/14/2019, 08/07/2019     Allergies   Allergen Reactions     Bees Hives and Swelling     Pyridoxine Hives     Other reaction(s): Angioedema         EXAM:   Constitutional:  Appears well-developed and well-nourished. No distress.   HEENT:   Head: Normocephalic.   Nasal tissue pink and normal appearing.  No rhinorrhea noted.    Eyes: Conjunctivae are non-erythematous   Cardiovascular: Normal rate,  regular rhythm and normal heart sounds. Exam reveals no gallop and no friction rub.   No murmur heard.  Respiratory: Effort normal and breath sounds normal. No respiratory distress. No wheezes. No rales.   Musculoskeletal: Normal range of motion.   Neuro: Oriented to person, place, and time.  Skin: Skin is warm and dry. No rash noted.   Psychiatric: Normal mood and affect.     Nursing note and vitals reviewed.    ASSESSMENT/PLAN:  Problem List Items Addressed This Visit        Immune    Anaphylaxis, subsequent encounter     History of anaphylaxis in 2016 after insect sting. Anaphylaxis in 2018 thought related to insect sting.   On venom allergy shots for mixed vespid and wasp.  Tolerating.  No intervals stings.  Normal serum tryptase.     -Continue venom immunotherapy.  Continue to carry injectable epinephrine.  Updated anaphylaxis action plan was provided.  -Continue to avoid flying stinging insects.              Return in 1 year or sooner if needed.    Chart documentation with Dragon Voice recognition Software. Although reviewed after completion, some words and grammatical errors may remain.    Modesto Estes DO FAAAAI  Medical Director for Allergy/Immunology at [place], [place]        Again, thank you for allowing me to participate in the care of your patient.        Sincerely,        Modesto Estes DO

## 2020-10-12 ENCOUNTER — OFFICE VISIT (OUTPATIENT)
Dept: FAMILY MEDICINE | Facility: CLINIC | Age: 60
End: 2020-10-12
Payer: COMMERCIAL

## 2020-10-12 VITALS
BODY MASS INDEX: 26.95 KG/M2 | DIASTOLIC BLOOD PRESSURE: 84 MMHG | WEIGHT: 157 LBS | OXYGEN SATURATION: 97 % | SYSTOLIC BLOOD PRESSURE: 124 MMHG | TEMPERATURE: 97 F | HEART RATE: 69 BPM

## 2020-10-12 DIAGNOSIS — Z12.11 SCREENING FOR COLON CANCER: ICD-10-CM

## 2020-10-12 DIAGNOSIS — I10 HYPERTENSION GOAL BP (BLOOD PRESSURE) < 140/90: Primary | ICD-10-CM

## 2020-10-12 DIAGNOSIS — E78.5 HYPERLIPIDEMIA LDL GOAL <130: ICD-10-CM

## 2020-10-12 DIAGNOSIS — R73.03 PREDIABETES: ICD-10-CM

## 2020-10-12 DIAGNOSIS — Z23 NEED FOR PROPHYLACTIC VACCINATION AND INOCULATION AGAINST INFLUENZA: ICD-10-CM

## 2020-10-12 LAB
ANION GAP SERPL CALCULATED.3IONS-SCNC: 3 MMOL/L (ref 3–14)
BUN SERPL-MCNC: 11 MG/DL (ref 7–30)
CALCIUM SERPL-MCNC: 9.4 MG/DL (ref 8.5–10.1)
CHLORIDE SERPL-SCNC: 110 MMOL/L (ref 94–109)
CO2 SERPL-SCNC: 27 MMOL/L (ref 20–32)
CREAT SERPL-MCNC: 0.82 MG/DL (ref 0.52–1.04)
GFR SERPL CREATININE-BSD FRML MDRD: 79 ML/MIN/{1.73_M2}
GLUCOSE SERPL-MCNC: 100 MG/DL (ref 70–99)
HBA1C MFR BLD: 5.6 % (ref 0–5.6)
POTASSIUM SERPL-SCNC: 4.2 MMOL/L (ref 3.4–5.3)
SODIUM SERPL-SCNC: 140 MMOL/L (ref 133–144)

## 2020-10-12 PROCEDURE — 80048 BASIC METABOLIC PNL TOTAL CA: CPT | Performed by: FAMILY MEDICINE

## 2020-10-12 PROCEDURE — 83036 HEMOGLOBIN GLYCOSYLATED A1C: CPT | Performed by: FAMILY MEDICINE

## 2020-10-12 PROCEDURE — 90682 RIV4 VACC RECOMBINANT DNA IM: CPT | Performed by: FAMILY MEDICINE

## 2020-10-12 PROCEDURE — 90471 IMMUNIZATION ADMIN: CPT | Performed by: FAMILY MEDICINE

## 2020-10-12 PROCEDURE — 99214 OFFICE O/P EST MOD 30 MIN: CPT | Mod: 25 | Performed by: FAMILY MEDICINE

## 2020-10-12 ASSESSMENT — PAIN SCALES - GENERAL: PAINLEVEL: NO PAIN (0)

## 2020-10-12 NOTE — NURSING NOTE
"Chief Complaint   Patient presents with     Hypertension     Lipids       Initial /84   Pulse 69   Temp 97  F (36.1  C) (Tympanic)   Wt 71.2 kg (157 lb)   SpO2 97%   BMI 26.95 kg/m   Estimated body mass index is 26.95 kg/m  as calculated from the following:    Height as of 10/8/20: 1.626 m (5' 4\").    Weight as of this encounter: 71.2 kg (157 lb).  Medication Reconciliation: complete    BECKY Campos MA'  "

## 2020-10-28 ENCOUNTER — TRANSFERRED RECORDS (OUTPATIENT)
Dept: HEALTH INFORMATION MANAGEMENT | Facility: CLINIC | Age: 60
End: 2020-10-28

## 2020-10-28 LAB — COLOGUARD-ABSTRACT: NEGATIVE

## 2020-11-02 ENCOUNTER — TELEPHONE (OUTPATIENT)
Dept: ALLERGY | Facility: CLINIC | Age: 60
End: 2020-11-02

## 2020-11-02 NOTE — TELEPHONE ENCOUNTER
RN returned patient call.      Patient decided to keep scheduled appointment.    Sonia PEDERSEN RN

## 2020-11-04 ENCOUNTER — TELEPHONE (OUTPATIENT)
Dept: FAMILY MEDICINE | Facility: CLINIC | Age: 60
End: 2020-11-04

## 2020-11-04 NOTE — TELEPHONE ENCOUNTER
Received negative Cologuard. Mailed normal letter, sent to stat scanning and placed in your basket to review.Jackie Ceja MA/JOSE ALEJANDRO

## 2020-11-04 NOTE — LETTER
November 4, 2020      Kaleigh Tyson  [address] [facility] [address]      Dear Kaleigh,    The result of your recent Cologuard testing was negative. A negative result means that Cologuard did not detect significant levels of DNA and/or hemoglobin biomarkers in the stool which are associated with colon cancer or precancer.    Thank you for completing your screening, your next screening should be completed in 3 years.      If you have any questions or concerns, please contact your care team at [phone].     Sincerely,  Lauren Saez MD/sky

## 2020-11-19 ENCOUNTER — ALLIED HEALTH/NURSE VISIT (OUTPATIENT)
Dept: ALLERGY | Facility: CLINIC | Age: 60
End: 2020-11-19
Payer: COMMERCIAL

## 2020-11-19 DIAGNOSIS — T63.441D TOXIC EFFECT OF VENOM OF BEES, UNINTENTIONAL, SUBSEQUENT ENCOUNTER: Primary | ICD-10-CM

## 2020-11-19 PROCEDURE — 95117 IMMUNOTHERAPY INJECTIONS: CPT

## 2020-11-19 PROCEDURE — 99207 PR DROP WITH A PROCEDURE: CPT

## 2020-11-19 NOTE — PROGRESS NOTES
Patient presented after waiting 30 minutes with normal reaction to allergy injections. Discharged from clinic.    Cristiana Calvin RN Specialty Triage 11/19/2020 10:48 AM

## 2020-12-13 ENCOUNTER — HEALTH MAINTENANCE LETTER (OUTPATIENT)
Age: 60
End: 2020-12-13

## 2020-12-31 ENCOUNTER — ANCILLARY PROCEDURE (OUTPATIENT)
Dept: MAMMOGRAPHY | Facility: CLINIC | Age: 60
End: 2020-12-31
Attending: FAMILY MEDICINE
Payer: COMMERCIAL

## 2020-12-31 DIAGNOSIS — Z12.31 ENCOUNTER FOR SCREENING MAMMOGRAM FOR BREAST CANCER: ICD-10-CM

## 2020-12-31 PROCEDURE — 77067 SCR MAMMO BI INCL CAD: CPT | Mod: TC | Performed by: RADIOLOGY

## 2021-01-07 ENCOUNTER — ALLIED HEALTH/NURSE VISIT (OUTPATIENT)
Dept: ALLERGY | Facility: CLINIC | Age: 61
End: 2021-01-07
Payer: COMMERCIAL

## 2021-01-07 DIAGNOSIS — T63.441D TOXIC EFFECT OF VENOM OF BEES, UNINTENTIONAL, SUBSEQUENT ENCOUNTER: Primary | ICD-10-CM

## 2021-01-07 PROCEDURE — 99207 PR DROP WITH A PROCEDURE: CPT

## 2021-01-07 PROCEDURE — 95117 IMMUNOTHERAPY INJECTIONS: CPT

## 2021-01-07 NOTE — PROGRESS NOTES
Patient presented after waiting 30 minutes with no reaction to allergy injections. Discharged from clinic.    Latha Ding RN, RN ............   1/7/2021...9:24 AM

## 2021-02-18 ENCOUNTER — ALLIED HEALTH/NURSE VISIT (OUTPATIENT)
Dept: ALLERGY | Facility: CLINIC | Age: 61
End: 2021-02-18
Payer: COMMERCIAL

## 2021-02-18 DIAGNOSIS — T63.441D TOXIC EFFECT OF VENOM OF BEES, UNINTENTIONAL, SUBSEQUENT ENCOUNTER: Primary | ICD-10-CM

## 2021-02-18 PROCEDURE — 95117 IMMUNOTHERAPY INJECTIONS: CPT

## 2021-02-18 PROCEDURE — 99207 PR DROP WITH A PROCEDURE: CPT

## 2021-02-18 NOTE — PROGRESS NOTES
Patient presented after waiting 30 minutes with no reaction to allergy injections. Discharged from clinic.    Latha Ding RN............   2/18/2021...2:36 PM

## 2021-04-01 ENCOUNTER — ALLIED HEALTH/NURSE VISIT (OUTPATIENT)
Dept: ALLERGY | Facility: CLINIC | Age: 61
End: 2021-04-01
Payer: COMMERCIAL

## 2021-04-01 DIAGNOSIS — T78.2XXD ANAPHYLAXIS DUE TO HYMENOPTERA VENOM, ACCIDENTAL OR UNINTENTIONAL, SUBSEQUENT ENCOUNTER: ICD-10-CM

## 2021-04-01 DIAGNOSIS — T63.441D TOXIC EFFECT OF VENOM OF BEES, UNINTENTIONAL, SUBSEQUENT ENCOUNTER: Primary | ICD-10-CM

## 2021-04-01 DIAGNOSIS — T63.481D ANAPHYLAXIS DUE TO HYMENOPTERA VENOM, ACCIDENTAL OR UNINTENTIONAL, SUBSEQUENT ENCOUNTER: ICD-10-CM

## 2021-04-01 PROCEDURE — 99207 PR DROP WITH A PROCEDURE: CPT

## 2021-04-01 PROCEDURE — 95117 IMMUNOTHERAPY INJECTIONS: CPT

## 2021-04-01 NOTE — PROGRESS NOTES
Patient presented after waiting 30 minutes with no reaction to allergy injections. Discharged from clinic.    Latha Ding RN............   4/1/2021...10:57 AM

## 2021-04-01 NOTE — TELEPHONE ENCOUNTER
ALLERGY SOLUTION RE-ORDER REQUEST    Kaleigh Tyson 1960 MRN: [id number]    DATE NEEDED:  5/6/2021  Vial Color Content    Vial Size  Red 1:1 Wasp    13ml  Red 1:1 Mixed Vespid   13ml        Serum reorder consent signed and patient/parent was advised that new serums would be ordered through the pharmacy and billed to their insurance company when they arrive in clinic. Yes    Shot Clinic Location:  [facility]  Ship to Location: [facility]  Serum billed to:  [facility]    Special Instructions:  None        Requester Signature  Yael Anton MA

## 2021-04-05 NOTE — PROGRESS NOTES
SUBJECTIVE:   CC: Kaleigh Tyson is an 60 year old woman who presents for preventive health visit.     Patient has been advised of split billing requirements and indicates understanding: Yes     Healthy Habits:     Getting at least 3 servings of Calcium per day:  Yes    Bi-annual eye exam:  Yes    Dental care twice a year:  Yes    Sleep apnea or symptoms of sleep apnea:  None    Diet:  Regular (no restrictions)    Frequency of exercise:  4-5 days/week    Duration of exercise:  45-60 minutes    Taking medications regularly:  Yes    Medication side effects:  None    PHQ-2 Total Score: 0    Additional concerns today:  No      Today's PHQ-2 Score:   PHQ-2 (  [employer]) 2021   Q1: Little interest or pleasure in doing things 0   Q2: Feeling down, depressed or hopeless 0   PHQ-2 Score 0   Q1: Little interest or pleasure in doing things Not at all   Q2: Feeling down, depressed or hopeless Not at all   PHQ-2 Score 0       Abuse: Current or Past (Physical, Sexual or Emotional) - No  Do you feel safe in your environment? Yes    Social History     Tobacco Use     Smoking status: Light Tobacco Smoker     Packs/day: 0.50     Years: 5.00     Pack years: 2.50     Types: Cigarettes     Start date: 1985     Last attempt to quit: 2008     Years since quittin.2     Smokeless tobacco: Never Used   Substance Use Topics     Alcohol use: Yes     Comment: rare     If you drink alcohol do you typically have >3 drinks per day or >7 drinks per week? No    No flowsheet data found.    Reviewed orders with patient.  Reviewed health maintenance and updated orders accordingly - Yes    G 1 P 1   No LMP recorded. Patient is postmenopausal.     Fasting: Yes    Td: tdap        Flu: 10/2020      Covid: 21 - 1st dose      Shingrix: completed      PPV: NA      Last 3 Pap and HPV Results:   PAP / HPV Latest Ref Rng & Units 3/25/2020 3/21/2019 2016   PAP - NIL NIL NIL   HPV 16 DNA NEG:Negative Negative Negative -   HPV 18  DNA NEG:Negative Negative Negative -   OTHER HR HPV NEG:Negative Negative Positive(A) -                  Cholesterol:   Lab Results   Component Value Date    CHOL 192 2020     Lab Results   Component Value Date    HDL 46 2020     Lab Results   Component Value Date    LDL 98 2020     Lab Results   Component Value Date    TRIG 240 2020     Lab Results   Component Value Date    CHOLHDLRATIO 6.1 2009         MM2020  Dexa:  NA     Flex/colo: 10/2020 cologuard      Seat Belt: Yes    Sunscreen use: Yes   Calcium Intake: adeq  Health Care Directive: No  Sexually Active: Yes     Current contraception: none  History of abnormal Pap smear: No  Family history of colon/breast/ovarian cancer: No  Regular self breast exam: Yes  History of abnormal mammogram: No      Labs reviewed in EPIC  BP Readings from Last 3 Encounters:   21 126/80   10/12/20 124/84   20 134/80    Wt Readings from Last 3 Encounters:   21 70.3 kg (155 lb)   10/12/20 71.2 kg (157 lb)   10/08/20 70.3 kg (155 lb)                  Patient Active Problem List   Diagnosis     CARDIOVASCULAR SCREENING; LDL GOAL LESS THAN 160     Anaphylaxis, subsequent encounter     Right knee pain, unspecified chronicity     Cervical high risk HPV (human papillomavirus) test positive     Hypertension goal BP (blood pressure) < 140/90     Prediabetes     Hyperlipidemia LDL goal <130     Past Surgical History:   Procedure Laterality Date     NO HISTORY OF SURGERY         Social History     Tobacco Use     Smoking status: Light Tobacco Smoker     Packs/day: 0.50     Years: 5.00     Pack years: 2.50     Types: Cigarettes     Start date: 1985     Last attempt to quit: 2008     Years since quittin.2     Smokeless tobacco: Never Used   Substance Use Topics     Alcohol use: Yes     Comment: rare     Family History   Problem Relation Age of Onset     Heart Disease Father      Dementia Father      Diabetes Father       Neurologic Disorder Brother      Lipids Sister      Hyperlipidemia Sister      Lipids Sister      Myocardial Infarction Mother      Hypertension Mother      Hyperlipidemia Brother          Current Outpatient Medications   Medication Sig Dispense Refill     atorvastatin (LIPITOR) 10 MG tablet TAKE ONE TABLET BY MOUTH ONCE DAILY 90 tablet 1     EPINEPHrine (ANY BX GENERIC EQUIV) 0.3 MG/0.3ML injection 2-pack Inject 0.3 mLs (0.3 mg) into the muscle as needed for anaphylaxis 0.6 mL 11     lisinopril (ZESTRIL) 10 MG tablet TAKE ONE TABLET BY MOUTH ONCE DAILY 90 tablet 3     ORDER FOR ALLERGEN IMMUNOTHERAPY Mixed Vespid Venom 300 mcg/mL HS 12 ml  Diluent: HSA qs to 5ml 12 mL prn     ORDER FOR ALLERGEN IMMUNOTHERAPY Wasp Venom 100 mcg/mL HS 12 ml  Diluent: HSA qs to 5ml 12 mL prn       Breast Cancer Screening:  Any new diagnosis of family breast, ovarian, or bowel cancer? No    FSH-7: No flowsheet data found.    Mammogram Screening: Recommended mammography every 1-2 years with patient discussion and risk factor consideration  Pertinent mammograms are reviewed under the imaging tab.      Reviewed and updated as needed this visit by clinical staff  Tobacco  Allergies  Meds  Problems  Med Hx  Surg Hx  Fam Hx  Soc Hx          Reviewed and updated as needed this visit by Provider  Tobacco  Allergies  Meds  Problems  Med Hx  Surg Hx  Fam Hx             Review of Systems   Constitutional: Negative for chills and fever.   HENT: Negative for congestion and ear pain.    Eyes: Negative for pain.   Respiratory: Negative for cough.    Cardiovascular: Negative for chest pain.   Gastrointestinal: Negative for abdominal pain, constipation, diarrhea and hematochezia.   Breasts:  Negative for breast mass.   Genitourinary: Negative for frequency, genital sores, hematuria, pelvic pain and vaginal discharge.   Neurological: Negative for dizziness and headaches.   Psychiatric/Behavioral: The patient is not nervous/anxious.        "  OBJECTIVE:   /80   Pulse 69   Temp 97.4  F (36.3  C) (Tympanic)   Resp 14   Ht 1.626 m (5' 4\")   Wt 70.3 kg (155 lb)   SpO2 98%   Breastfeeding No   BMI 26.61 kg/m    Physical Exam  GENERAL: healthy, alert and no distress  EYES: Eyes grossly normal to inspection, PERRL and conjunctivae and sclerae normal  HENT: ear canals and TM's normal, nose and mouth without ulcers or lesions  NECK: no adenopathy, no asymmetry, masses, or scars and thyroid normal to palpation  RESP: lungs clear to auscultation - no rales, rhonchi or wheezes  BREAST: normal without masses, tenderness or nipple discharge and no palpable axillary masses or adenopathy  CV: regular rate and rhythm, normal S1 S2, no S3 or S4, no murmur, click or rub, no peripheral edema and peripheral pulses strong  ABDOMEN: soft, nontender, no hepatosplenomegaly, no masses and bowel sounds normal  MS: no gross musculoskeletal defects noted, no edema  SKIN: no suspicious lesions or rashes  NEURO: Normal strength and tone, mentation intact and speech normal  PSYCH: mentation appears normal, affect normal/bright    Diagnostic Test Results:  Labs reviewed in Epic    ASSESSMENT/PLAN:   (Z00.00) Routine general medical examination at a health care facility  (primary encounter diagnosis)  Comment: Preventative needs reviewed  Plan: Follow up in 1 year for annual exam    (Z91.89) 10 year risk of MI or stroke 7.5% or greater  Comment: Last lipid panel in 05/2020; taking medication as prescribed without side effects  Plan: Lipid panel reflex to direct LDL Non-fasting,         atorvastatin (LIPITOR) 10 MG tablet,         DISCONTINUED: atorvastatin (LIPITOR) 10 MG         tablet        Refilled x 12 months    (I10) Hypertension goal BP (blood pressure) < 140/90  Comment: stable; taking medication as prescribed without side effects  Plan: BASIC METABOLIC PANEL, lisinopril (ZESTRIL) 10         MG tablet, DISCONTINUED: lisinopril (ZESTRIL)         10 MG tablet        " "Refilled x 6 months        Follow up in 6 months for BMP and BP check    Patient has been advised of split billing requirements and indicates understanding: Yes  COUNSELING:  Reviewed preventive health counseling, as reflected in patient instructions  Special attention given to:        Regular exercise       Healthy diet/nutrition    Estimated body mass index is 26.61 kg/m  as calculated from the following:    Height as of this encounter: 1.626 m (5' 4\").    Weight as of this encounter: 70.3 kg (155 lb).    Weight management plan: Discussed healthy diet and exercise guidelines    She reports that she has been smoking cigarettes. She started smoking about 35 years ago. She has a 2.50 pack-year smoking history. She has never used smokeless tobacco.  Tobacco Cessation Action Plan:   Information offered: Patient not interested at this time      Counseling Resources:  ATP IV Guidelines  Pooled Cohorts Equation Calculator  Breast Cancer Risk Calculator  BRCA-Related Cancer Risk Assessment: FHS-7 Tool  FRAX Risk Assessment  ICSI Preventive Guidelines  Dietary Guidelines for Americans, 2010  USDA's MyPlate  ASA Prophylaxis  Lung CA Screening    Lauren Saez MD  [facility]    DEISY Paredes  "

## 2021-04-08 ASSESSMENT — ENCOUNTER SYMPTOMS
CHILLS: 0
HEADACHES: 0
HEMATURIA: 0
CONSTIPATION: 0
ABDOMINAL PAIN: 0
DIZZINESS: 0
NERVOUS/ANXIOUS: 0
EYE PAIN: 0
COUGH: 0
FREQUENCY: 0
HEMATOCHEZIA: 0
FEVER: 0
DIARRHEA: 0
BREAST MASS: 0

## 2021-04-13 ASSESSMENT — ENCOUNTER SYMPTOMS
HEADACHES: 0
COUGH: 0
HEMATOCHEZIA: 0
DIARRHEA: 0
FREQUENCY: 0
HEMATURIA: 0
CHILLS: 0
DIZZINESS: 0
ABDOMINAL PAIN: 0
FEVER: 0
EYE PAIN: 0
BREAST MASS: 0
NERVOUS/ANXIOUS: 0
CONSTIPATION: 0

## 2021-04-14 ENCOUNTER — MYC MEDICAL ADVICE (OUTPATIENT)
Dept: ALLERGY | Facility: CLINIC | Age: 61
End: 2021-04-14

## 2021-04-14 ENCOUNTER — OFFICE VISIT (OUTPATIENT)
Dept: FAMILY MEDICINE | Facility: CLINIC | Age: 61
End: 2021-04-14
Payer: COMMERCIAL

## 2021-04-14 ENCOUNTER — TELEPHONE (OUTPATIENT)
Dept: ALLERGY | Facility: CLINIC | Age: 61
End: 2021-04-14

## 2021-04-14 VITALS
HEIGHT: 64 IN | BODY MASS INDEX: 26.46 KG/M2 | TEMPERATURE: 97.4 F | DIASTOLIC BLOOD PRESSURE: 80 MMHG | OXYGEN SATURATION: 98 % | WEIGHT: 155 LBS | HEART RATE: 69 BPM | SYSTOLIC BLOOD PRESSURE: 126 MMHG | RESPIRATION RATE: 14 BRPM

## 2021-04-14 DIAGNOSIS — Z91.89 10 YEAR RISK OF MI OR STROKE 7.5% OR GREATER: ICD-10-CM

## 2021-04-14 DIAGNOSIS — I10 HYPERTENSION GOAL BP (BLOOD PRESSURE) < 140/90: ICD-10-CM

## 2021-04-14 DIAGNOSIS — Z00.00 ROUTINE GENERAL MEDICAL EXAMINATION AT A HEALTH CARE FACILITY: Primary | ICD-10-CM

## 2021-04-14 LAB
ANION GAP SERPL CALCULATED.3IONS-SCNC: 3 MMOL/L (ref 3–14)
BUN SERPL-MCNC: 16 MG/DL (ref 7–30)
CALCIUM SERPL-MCNC: 10 MG/DL (ref 8.5–10.1)
CHLORIDE SERPL-SCNC: 109 MMOL/L (ref 94–109)
CHOLEST SERPL-MCNC: 214 MG/DL
CO2 SERPL-SCNC: 26 MMOL/L (ref 20–32)
CREAT SERPL-MCNC: 0.79 MG/DL (ref 0.52–1.04)
GFR SERPL CREATININE-BSD FRML MDRD: 82 ML/MIN/{1.73_M2}
GLUCOSE SERPL-MCNC: 99 MG/DL (ref 70–99)
HDLC SERPL-MCNC: 56 MG/DL
LDLC SERPL CALC-MCNC: 127 MG/DL
NONHDLC SERPL-MCNC: 158 MG/DL
POTASSIUM SERPL-SCNC: 3.9 MMOL/L (ref 3.4–5.3)
SODIUM SERPL-SCNC: 138 MMOL/L (ref 133–144)
TRIGL SERPL-MCNC: 155 MG/DL

## 2021-04-14 PROCEDURE — 36415 COLL VENOUS BLD VENIPUNCTURE: CPT | Performed by: FAMILY MEDICINE

## 2021-04-14 PROCEDURE — 99396 PREV VISIT EST AGE 40-64: CPT | Performed by: FAMILY MEDICINE

## 2021-04-14 PROCEDURE — 80048 BASIC METABOLIC PNL TOTAL CA: CPT | Performed by: FAMILY MEDICINE

## 2021-04-14 PROCEDURE — 80061 LIPID PANEL: CPT | Performed by: FAMILY MEDICINE

## 2021-04-14 RX ORDER — LISINOPRIL 10 MG/1
10 TABLET ORAL DAILY
Qty: 30 TABLET | Refills: 5 | Status: SHIPPED | OUTPATIENT
Start: 2021-04-14 | End: 2021-10-20

## 2021-04-14 RX ORDER — LISINOPRIL 10 MG/1
10 TABLET ORAL DAILY
Qty: 90 TABLET | Refills: 1 | Status: SHIPPED | OUTPATIENT
Start: 2021-04-14 | End: 2021-04-14

## 2021-04-14 RX ORDER — ATORVASTATIN CALCIUM 10 MG/1
10 TABLET, FILM COATED ORAL DAILY
Qty: 90 TABLET | Refills: 3 | Status: SHIPPED | OUTPATIENT
Start: 2021-04-14 | End: 2021-04-14

## 2021-04-14 RX ORDER — ATORVASTATIN CALCIUM 10 MG/1
10 TABLET, FILM COATED ORAL DAILY
Qty: 30 TABLET | Refills: 11 | Status: SHIPPED | OUTPATIENT
Start: 2021-04-14 | End: 2022-04-18

## 2021-04-14 ASSESSMENT — MIFFLIN-ST. JEOR: SCORE: 1268.08

## 2021-04-14 NOTE — TELEPHONE ENCOUNTER
Reason for Call:  Other appointment    Detailed comments: Patient is calling in today to get her allergy shot moved to a different day due to getting her 2nd covid shot 05/12/21. Can someone please give her a call to reschedule. Thank you    Phone Number Patient can be reached at: Cell number on file:    Telephone Information:   Mobile [phone]       Best Time: anytime    Can we leave a detailed message on this number? YES    Call taken on 4/14/2021 at 11:49 AM by Camille Lagos

## 2021-04-14 NOTE — TELEPHONE ENCOUNTER
Routing refill request to provider for review/approval because:  NEW PRESCRIPTION NEEDED, LAST WRITTEN RX WAS ON 4/14/2020.    FAX NEW RX'S TO [facility]   Shauna Colon RN

## 2021-04-14 NOTE — TELEPHONE ENCOUNTER
Returned call to patient and discussed that it was ok to get allergy shot the next day after her COVID vaccine. Patient still requesting to move appointment to the following week. Patient rescheduled. Latha Ding RN, BSN Specialty Clinics

## 2021-04-16 DIAGNOSIS — T63.441D TOXIC EFFECT OF VENOM OF BEES, UNINTENTIONAL, SUBSEQUENT ENCOUNTER: Primary | ICD-10-CM

## 2021-04-16 PROCEDURE — 95148 ANTIGEN THERAPY SERVICES: CPT | Performed by: ALLERGY & IMMUNOLOGY

## 2021-04-16 PROCEDURE — 95148 ANTIGEN THERAPY SERVICES: CPT | Mod: 59 | Performed by: ALLERGY & IMMUNOLOGY

## 2021-04-16 NOTE — PROGRESS NOTES
Allergy serums billed to [facility].     Vials billed below:    Vial Color Content                      Vial Size Expiration Date  Red 1:1                                   Wasp                               13ml 4/16/2022  Red 1:1                                   Mixed Vespid                    13ml 4/16/2022    Checked by Damari PHILIPPE RN  Billed 26 units      Signature  Damari Noriega RN

## 2021-04-29 NOTE — TELEPHONE ENCOUNTER
Allergy serums received at [facility].     Vials received below:    Vial Color Content                      Vial Size Expiration Date  Red 1:1 Wasp 13 mL  04/16/22  Red 1:1 Mixed Vespid 13 mL  04/16/222          Signature  Laney Lozada MA

## 2021-05-20 ENCOUNTER — ALLIED HEALTH/NURSE VISIT (OUTPATIENT)
Dept: ALLERGY | Facility: CLINIC | Age: 61
End: 2021-05-20
Payer: COMMERCIAL

## 2021-05-20 DIAGNOSIS — T63.441D TOXIC EFFECT OF VENOM OF BEES, UNINTENTIONAL, SUBSEQUENT ENCOUNTER: Primary | ICD-10-CM

## 2021-05-20 PROCEDURE — 99207 PR DROP WITH A PROCEDURE: CPT

## 2021-05-20 PROCEDURE — 95117 IMMUNOTHERAPY INJECTIONS: CPT

## 2021-05-20 NOTE — PROGRESS NOTES
Patient presented after waiting 30 minutes with no reaction to allergy injections. Discharged from clinic.    Latha Ding RN............   5/20/2021...10:31 AM

## 2021-06-02 ENCOUNTER — TELEPHONE (OUTPATIENT)
Dept: ALLERGY | Facility: OTHER | Age: 61
End: 2021-06-02

## 2021-06-02 NOTE — TELEPHONE ENCOUNTER
Please advise new dosing orders, building schedule, and date which orders are valid through.  Patient's last shot was 5/20/2021, dose was 0.5 red new vial cutback, next appointment scheduled for 06/04/2021 which will be 15 days. Repeat dose or move forward with 0.75 red?     New orders will be added to immunotherapy flowsheet once they are received.     Diann MONTANEZ RN, Allergy Clinic 06/02/21 10:17 AM

## 2021-06-04 ENCOUNTER — ALLIED HEALTH/NURSE VISIT (OUTPATIENT)
Dept: ALLERGY | Facility: CLINIC | Age: 61
End: 2021-06-04
Payer: COMMERCIAL

## 2021-06-04 DIAGNOSIS — T63.441D TOXIC EFFECT OF VENOM OF BEES, UNINTENTIONAL, SUBSEQUENT ENCOUNTER: Primary | ICD-10-CM

## 2021-06-04 PROCEDURE — 99207 PR DROP WITH A PROCEDURE: CPT

## 2021-06-04 PROCEDURE — 95117 IMMUNOTHERAPY INJECTIONS: CPT

## 2021-06-04 NOTE — PROGRESS NOTES
Patient presented after waiting 30 minutes with normal reaction to  injections. Discharged from clinic.    Diann MONTANEZ RN, Allergy Clinic 06/04/21 9:44 AM

## 2021-06-18 ENCOUNTER — ALLIED HEALTH/NURSE VISIT (OUTPATIENT)
Dept: ALLERGY | Facility: CLINIC | Age: 61
End: 2021-06-18
Payer: COMMERCIAL

## 2021-06-18 DIAGNOSIS — T63.441D TOXIC EFFECT OF VENOM OF BEES, UNINTENTIONAL, SUBSEQUENT ENCOUNTER: Primary | ICD-10-CM

## 2021-06-18 PROCEDURE — 95117 IMMUNOTHERAPY INJECTIONS: CPT

## 2021-06-18 PROCEDURE — 99207 PR DROP WITH A PROCEDURE: CPT

## 2021-06-18 NOTE — PROGRESS NOTES
Patient presented after waiting 30 minutes with no reaction to allergy injections. Discharged from clinic.    Latha Ding RN............   6/18/2021...10:03 AM

## 2021-07-16 ENCOUNTER — ALLIED HEALTH/NURSE VISIT (OUTPATIENT)
Dept: ALLERGY | Facility: CLINIC | Age: 61
End: 2021-07-16
Payer: COMMERCIAL

## 2021-07-16 DIAGNOSIS — T63.441D TOXIC EFFECT OF VENOM OF BEES, UNINTENTIONAL, SUBSEQUENT ENCOUNTER: Primary | ICD-10-CM

## 2021-07-16 PROCEDURE — 95117 IMMUNOTHERAPY INJECTIONS: CPT

## 2021-07-16 NOTE — PROGRESS NOTES
Patient presented after waiting 30 minutes with no reaction to allergy injections. Discharged from clinic.    Cristiana VELEZ RN Specialty Triage 7/16/2021 9:59 AM

## 2021-08-19 ENCOUNTER — OFFICE VISIT (OUTPATIENT)
Dept: ALLERGY | Facility: CLINIC | Age: 61
End: 2021-08-19
Payer: COMMERCIAL

## 2021-08-19 VITALS
OXYGEN SATURATION: 98 % | BODY MASS INDEX: 26.46 KG/M2 | SYSTOLIC BLOOD PRESSURE: 114 MMHG | HEIGHT: 64 IN | WEIGHT: 155 LBS | HEART RATE: 90 BPM | DIASTOLIC BLOOD PRESSURE: 78 MMHG

## 2021-08-19 DIAGNOSIS — T78.2XXD ANAPHYLAXIS, SUBSEQUENT ENCOUNTER: ICD-10-CM

## 2021-08-19 PROCEDURE — 99213 OFFICE O/P EST LOW 20 MIN: CPT | Performed by: ALLERGY & IMMUNOLOGY

## 2021-08-19 ASSESSMENT — MIFFLIN-ST. JEOR: SCORE: 1263.08

## 2021-08-19 NOTE — LETTER
[facility]                   FOOD ALLERGY & ANAPHYLAXIS EMERGENCY CARE PLAN  [employer] Education         Name: Kaleigh OLAF HILLO.B.:  [id number]    Allergy to: Venom   Weight: 155 lbs 0 oz lbs.  Asthma:  No    -NOTE: Do not depend on antihistamines or inhalers (bronchodilators) to treat a severe reaction. USE EPINEPHRINE.     MEDICATIONS/DOSES  Epinephrine Brand: Epipen  Epinephrine Dose: 0.3 mg IM  Antihistamine Brand or Generic: Zyrtec (Cetirizine)  Antihistamine Dose: 10mg         FARE                   FOOD ALLERGY & ANAPHYLAXIS EMERGENCY CARE PLAN   Food Allergy Research [employer] Education           EMERGENCY CONTACTS - CALL 911  DOCTOR:  Modesto Estes MD   PHONE: [phone]  PARENT/GUARDIAN:              PHONE:  OTHER EMERGENCY CONTACTS  NAME/RELATIONSHIP:   PHONE:   NAME/RELATIONSHIP:    PHONE:           PARENT/GUARDIAN AUTHORIZATION SIGNATURE     DATE              PHYSICIAN/H CP AUTHORIZATION SIGNATURE         DATE  FORM PROVIDED COURTESY OF FOOD ALLERGY [employer] EDUC[employer] (FARE) ([email or web address]) 2014

## 2021-08-19 NOTE — PATIENT INSTRUCTIONS
Allergy Staff Appt Hours Shot Hours Locations    Physician     Modesto Estes DO       Support Staff     FAVIAN Odell CMA  Tuesday:   [place] 7-5 Wednesday:  [place]: 7-5 Thursday:                    [place] 7-6     Friday:  [place]  7-2   [place]        Thursday: 7-5:20        Friday: 7-12:20     [place]        Tuesday: 7- 3:20 Wednesday: 7-4:20 [place] Monday: 7-2:20 Tuesday: 9-5:20         [facility]  [address] [place] [address]  Appt Line: [phone]      [facility]  [address] [place] [address]  Appt Line: [phone]         Important Scheduling Information  Aspirin Desensitization: Appt will last 2 clinic days. Please call the Allergy RN line for your clinic to schedule. Discontinue antihistamines 7 days prior to the appointment.     Food Challenges: Appt will last 3-4 hours. Please call the Allergy RN line for your clinic to schedule. Discontinue antihistamines 7 days prior to the appointment.     Penicillin Testing: Appt will last 2-3 hours. Please call the Allergy RN line for your clinic to schedule. Discontinue antihistamines 7 days prior to the appointment.     Skin Testing: Appt will about 40 minutes. Call the appointment line for your clinic to schedule. Discontinue antihistamines 7 days prior to the appointment.     Venom Testing: Appt will last 2-3 hours. Please call the Allergy RN line for your clinic to schedule. Discontinue antihistamines 7 days prior to the appointment.     Thank you for trusting us with your Allergy, Asthma, and Immunology care. Please feel free to contact us with any questions or concerns you may have.      - Continue venom allergy shots. Get every 8 weeks.   - Discuss with Dr. Saez about switching off of lisinopril.

## 2021-08-19 NOTE — LETTER
8/19/2021         RE: Kaleigh Tyson  [address] [facility] [address]        Dear Colleague,    Thank you for referring your patient, Kaleigh Tyson, to the [facility]. Please see a copy of my visit note below.    Kaleigh Tyson is a 61 year old White female with previous medical history significant for venom immunotherapy who returns for a follow up visit.     Patient returns for follow-up.  She is on venom immunotherapy.  She has been on venom immunotherapy since 2018.  Since starting on venom immunotherapy she has been started on an ACE inhibitor for blood pressure control.  She has tolerated venom immunotherapy.  She has been stung without any sort of reaction since being on venom immunotherapy.  She continues to carry injectable epinephrine.  She is receiving shots every 6 weeks.      Past Medical History:   Diagnosis Date     Cervical high risk HPV (human papillomavirus) test positive 03/21/2019    see problem list     Elevated cholesterol     Low HDL - 41 in 1/09     Hypertension      Family History   Problem Relation Age of Onset     Heart Disease Father      Dementia Father      Diabetes Father      Neurologic Disorder Brother      Lipids Sister      Hyperlipidemia Sister      Lipids Sister      Myocardial Infarction Mother      Hypertension Mother      Hyperlipidemia Brother      Past Surgical History:   Procedure Laterality Date     NO HISTORY OF SURGERY         REVIEW OF SYSTEMS:  Nose: negative for snoring.negative for changes in smell. negative for drainage.   Eyes: negative for eye watering. negative for eye itching. negative for vision changes. negative for eye redness.  Throat: negative for hoarseness. negative for sore throat. negative for trouble swallowing.   Lungs: negative for shortness of breath.negative for wheezing. negative for sputum production.   Integumentary: negative for rash. negative for scaling. negative for nail changes.       Current Outpatient  Medications:      atorvastatin (LIPITOR) 10 MG tablet, Take 1 tablet (10 mg) by mouth daily, Disp: 30 tablet, Rfl: 11     EPINEPHrine (ANY BX GENERIC EQUIV) 0.3 MG/0.3ML injection 2-pack, Inject 0.3 mLs (0.3 mg) into the muscle as needed for anaphylaxis, Disp: 0.6 mL, Rfl: 11     lisinopril (ZESTRIL) 10 MG tablet, Take 1 tablet (10 mg) by mouth daily, Disp: 30 tablet, Rfl: 5     ORDER FOR ALLERGEN IMMUNOTHERAPY, Mixed Vespid Venom 300 mcg/mL HS 12 ml Diluent: HSA qs to 5ml, Disp: 12 mL, Rfl: prn     ORDER FOR ALLERGEN IMMUNOTHERAPY, Wasp Venom 100 mcg/mL HS 12 ml Diluent: HSA qs to 5ml, Disp: 12 mL, Rfl: prn  Immunization History   Administered Date(s) Administered     COVID-19,PF,Moderna 04/16/2021, 05/13/2021     HepB-Adult 02/22/2019     Influenza Quad, Recombinant, p-free (RIV4) 10/12/2020     Mantoux Tuberculin Skin Test 02/22/2019     TD (ADULT, 7+) 06/16/2004     TDAP Vaccine (Adacel) 08/02/2016     Zoster vaccine recombinant adjuvanted (SHINGRIX) 05/14/2019, 08/07/2019     Allergies   Allergen Reactions     Bees Hives and Swelling     Pyridoxine Hives     Other reaction(s): Angioedema         EXAM:   Constitutional:  Appears well-developed and well-nourished. No distress.   HEENT:   Head: Normocephalic.   Nasal tissue pink and normal appearing.  No rhinorrhea noted.    Eyes: Conjunctivae are non-erythematous   No maxillary or frontal sinus tenderness to palpation.   Cardiovascular: Normal rate, regular rhythm and normal heart sounds. Exam reveals no gallop and no friction rub.   No murmur heard.  Respiratory: Effort normal and breath sounds normal. No respiratory distress. No wheezes. No rales.   Musculoskeletal: Normal range of motion.   Neuro: Oriented to person, place, and time.  Skin: Skin is warm and dry. No rash noted.   Psychiatric: Normal mood and affect.     Nursing note and vitals reviewed.    ASSESSMENT/PLAN:  Problem List Items Addressed This Visit        Immune    Anaphylaxis, subsequent  encounter     History of anaphylaxis in 2016 after insect sting. Anaphylaxis in 2018 thought related to insect sting.   On venom allergy shots for mixed vespid and wasp. Tolerating. No intervals stings. Normal serum tryptase. Started on ACE inhibitor since started on venom allergy shots.     -Continue venom immunotherapy.  Continue to carry injectable epinephrine.  Updated anaphylaxis action plan was provided. Receive shots now every 8 weeks.   - She will discuss switching from ace inhibitor with primary care. Theoretical risk of worse insect sting reaction while on ace inhibitor.   -Continue to avoid flying stinging insects.                Chart documentation with Dragon Voice recognition Software. Although reviewed after completion, some words and grammatical errors may remain.    Modesto Estes DO FAAAAI  Medical Director for Allergy/Immunology at [place], [place]        Again, thank you for allowing me to participate in the care of your patient.        Sincerely,        Modesto Estes MD

## 2021-08-19 NOTE — ASSESSMENT & PLAN NOTE
History of anaphylaxis in 2016 after insect sting. Anaphylaxis in 2018 thought related to insect sting.   On venom allergy shots for mixed vespid and wasp. Tolerating. No intervals stings. Normal serum tryptase. Started on ACE inhibitor since started on venom allergy shots.     -Continue venom immunotherapy.  Continue to carry injectable epinephrine.  Updated anaphylaxis action plan was provided. Receive shots now every 8 weeks.   - She will discuss switching from ace inhibitor with primary care. Theoretical risk of worse insect sting reaction while on ace inhibitor.   -Continue to avoid flying stinging insects.

## 2021-08-19 NOTE — PROGRESS NOTES
Kaleigh Tyson is a 61 year old White female with previous medical history significant for venom immunotherapy who returns for a follow up visit.     Patient returns for follow-up.  She is on venom immunotherapy.  She has been on venom immunotherapy since 2018.  Since starting on venom immunotherapy she has been started on an ACE inhibitor for blood pressure control.  She has tolerated venom immunotherapy.  She has been stung without any sort of reaction since being on venom immunotherapy.  She continues to carry injectable epinephrine.  She is receiving shots every 6 weeks.      Past Medical History:   Diagnosis Date     Cervical high risk HPV (human papillomavirus) test positive 03/21/2019    see problem list     Elevated cholesterol     Low HDL - 41 in 1/09     Hypertension      Family History   Problem Relation Age of Onset     Heart Disease Father      Dementia Father      Diabetes Father      Neurologic Disorder Brother      Lipids Sister      Hyperlipidemia Sister      Lipids Sister      Myocardial Infarction Mother      Hypertension Mother      Hyperlipidemia Brother      Past Surgical History:   Procedure Laterality Date     NO HISTORY OF SURGERY         REVIEW OF SYSTEMS:  Nose: negative for snoring.negative for changes in smell. negative for drainage.   Eyes: negative for eye watering. negative for eye itching. negative for vision changes. negative for eye redness.  Throat: negative for hoarseness. negative for sore throat. negative for trouble swallowing.   Lungs: negative for shortness of breath.negative for wheezing. negative for sputum production.   Integumentary: negative for rash. negative for scaling. negative for nail changes.       Current Outpatient Medications:      atorvastatin (LIPITOR) 10 MG tablet, Take 1 tablet (10 mg) by mouth daily, Disp: 30 tablet, Rfl: 11     EPINEPHrine (ANY BX GENERIC EQUIV) 0.3 MG/0.3ML injection 2-pack, Inject 0.3 mLs (0.3 mg) into the muscle as needed for  anaphylaxis, Disp: 0.6 mL, Rfl: 11     lisinopril (ZESTRIL) 10 MG tablet, Take 1 tablet (10 mg) by mouth daily, Disp: 30 tablet, Rfl: 5     ORDER FOR ALLERGEN IMMUNOTHERAPY, Mixed Vespid Venom 300 mcg/mL HS 12 ml Diluent: HSA qs to 5ml, Disp: 12 mL, Rfl: prn     ORDER FOR ALLERGEN IMMUNOTHERAPY, Wasp Venom 100 mcg/mL HS 12 ml Diluent: HSA qs to 5ml, Disp: 12 mL, Rfl: prn  Immunization History   Administered Date(s) Administered     COVID-19,PF,Moderna 04/16/2021, 05/13/2021     HepB-Adult 02/22/2019     Influenza Quad, Recombinant, p-free (RIV4) 10/12/2020     Mantoux Tuberculin Skin Test 02/22/2019     TD (ADULT, 7+) 06/16/2004     TDAP Vaccine (Adacel) 08/02/2016     Zoster vaccine recombinant adjuvanted (SHINGRIX) 05/14/2019, 08/07/2019     Allergies   Allergen Reactions     Bees Hives and Swelling     Pyridoxine Hives     Other reaction(s): Angioedema         EXAM:   Constitutional:  Appears well-developed and well-nourished. No distress.   HEENT:   Head: Normocephalic.   Nasal tissue pink and normal appearing.  No rhinorrhea noted.    Eyes: Conjunctivae are non-erythematous   No maxillary or frontal sinus tenderness to palpation.   Cardiovascular: Normal rate, regular rhythm and normal heart sounds. Exam reveals no gallop and no friction rub.   No murmur heard.  Respiratory: Effort normal and breath sounds normal. No respiratory distress. No wheezes. No rales.   Musculoskeletal: Normal range of motion.   Neuro: Oriented to person, place, and time.  Skin: Skin is warm and dry. No rash noted.   Psychiatric: Normal mood and affect.     Nursing note and vitals reviewed.    ASSESSMENT/PLAN:  Problem List Items Addressed This Visit        Immune    Anaphylaxis, subsequent encounter     History of anaphylaxis in 2016 after insect sting. Anaphylaxis in 2018 thought related to insect sting.   On venom allergy shots for mixed vespid and wasp. Tolerating. No intervals stings. Normal serum tryptase. Started on ACE  inhibitor since started on venom allergy shots.     -Continue venom immunotherapy.  Continue to carry injectable epinephrine.  Updated anaphylaxis action plan was provided. Receive shots now every 8 weeks.   - She will discuss switching from ace inhibitor with primary care. Theoretical risk of worse insect sting reaction while on ace inhibitor.   -Continue to avoid flying stinging insects.                Chart documentation with Dragon Voice recognition Software. Although reviewed after completion, some words and grammatical errors may remain.    Modesto Estes DO FAAAAI  Medical Director for Allergy/Immunology at [place], [place]

## 2021-08-27 ENCOUNTER — ALLIED HEALTH/NURSE VISIT (OUTPATIENT)
Dept: ALLERGY | Facility: CLINIC | Age: 61
End: 2021-08-27
Payer: COMMERCIAL

## 2021-08-27 DIAGNOSIS — T63.441D TOXIC EFFECT OF VENOM OF BEES, UNINTENTIONAL, SUBSEQUENT ENCOUNTER: Primary | ICD-10-CM

## 2021-08-27 PROCEDURE — 95117 IMMUNOTHERAPY INJECTIONS: CPT

## 2021-08-27 NOTE — PROGRESS NOTES
Patient presented after waiting 30 minutes with no reaction to allergy injections. Discharged from clinic.    Cynthia Leal RN

## 2021-09-10 ENCOUNTER — ALLIED HEALTH/NURSE VISIT (OUTPATIENT)
Dept: FAMILY MEDICINE | Facility: CLINIC | Age: 61
End: 2021-09-10
Payer: COMMERCIAL

## 2021-09-10 VITALS — DIASTOLIC BLOOD PRESSURE: 74 MMHG | HEART RATE: 68 BPM | SYSTOLIC BLOOD PRESSURE: 136 MMHG

## 2021-09-10 DIAGNOSIS — Z01.30 BLOOD PRESSURE CHECK: Primary | ICD-10-CM

## 2021-09-10 PROCEDURE — 99207 PR NO CHARGE NURSE ONLY: CPT | Performed by: FAMILY MEDICINE

## 2021-09-10 NOTE — PROGRESS NOTES
Kaleigh Tyson was evaluated at [facility] on September 10, 2021 at which time her blood pressure was:    BP Readings from Last 3 Encounters:   09/10/21 136/74   08/19/21 114/78   04/14/21 126/80     Pulse Readings from Last 3 Encounters:   09/10/21 68   08/19/21 90   04/14/21 69       Reviewed lifestyle modifications for blood pressure control and reduction: including making healthy food choices, managing weight, getting regular exercise, smoking cessation, reducing alcohol consumption, monitoring blood pressure regularly.     Symptoms: None    BP Goal:< 140/90 mmHg    BP Assessment:  BP at goal    Potential Reasons for BP too high: NA - Not applicable    BP Follow-Up Plan: Recheck BP in 6 months at pharmacy    Recommendation to Provider: None    Note completed by: Jose Parikh RPh.  [facility]  [phone]

## 2021-09-14 ENCOUNTER — TELEPHONE (OUTPATIENT)
Dept: FAMILY MEDICINE | Facility: CLINIC | Age: 61
End: 2021-09-14

## 2021-09-14 NOTE — TELEPHONE ENCOUNTER
Reason for Call:  Other call back    Detailed comments: Patient's PCP has been Lauren Saez till now, but Dr's name didn't come up in the list of clinic providers when scheduling. Scheduled with routine cholesteral check with Jonathan Gates but patient would like to know if Dr. Saez is really 'off the market' as a PCP.    Phone Number Patient can be reached at: Home number on file [phone] (home)    Best Time: Any time    Can we leave a detailed message on this number? YES    Call taken on 9/14/2021 at 5:28 PM by Laura Kanavel

## 2021-09-15 NOTE — TELEPHONE ENCOUNTER
Contacted the patient and rescheduled her with Dr. Saez.     Dr. Saez,    She states she had an appt. With Dr. Estes in August where he discovered she was taking lisinopril (ZESTRIL) 10 MG tablet and was not aware of it. He stated with her doing Bee therapy she should not be taking this. He told the patient he was going to reach out to you in regards to this and now she is asking if he had. Please advise.  Thank you,  Emani Blanco

## 2021-09-15 NOTE — TELEPHONE ENCOUNTER
Pt was seen by me this year.  Uncertain why I was not listed, if appt available she can see me.  If was trying to get through on [employer] I will not be listed because I am not taking new patients.  Not clear why I was not available if went through central scheduling.

## 2021-09-15 NOTE — TELEPHONE ENCOUNTER
Called the patient and let her know. She will discuss it with Dr. Saez at her upcoming Office visit.  Emani Blanco

## 2021-09-15 NOTE — TELEPHONE ENCOUNTER
Dr. Saez,  Can you please answer to this? Seems obvious, but thought we better ask.  Thank you,  Emani Blanco

## 2021-09-26 ENCOUNTER — HEALTH MAINTENANCE LETTER (OUTPATIENT)
Age: 61
End: 2021-09-26

## 2021-10-12 NOTE — PROGRESS NOTES
"    Assessment & Plan     (I10) Hypertension goal BP (blood pressure) < 140/90  (primary encounter diagnosis)  Comment: to goal on lisinopril  Plan: amLODIPine (NORVASC) 5 MG tablet        Change to amlodipine, beta blocker not an option currently due to resting pulse of 62 and risk of bradycardia.  Discussed potential side effects and actions to take if they occur.   Follow-up 2 weeks for pharm bp check, adjust as needed  f/u 6 months for OV and labs preventive/fasting    (T63.441D) Anaphylactic reaction to bee sting, accidental or unintentional, subsequent encounter  Comment: needs refill  Plan: EPINEPHrine (ANY BX GENERIC EQUIV) 0.3 MG/0.3ML        injection 2-pack                        BMI:   Estimated body mass index is 27.05 kg/m  as calculated from the following:    Height as of 8/19/21: 1.626 m (5' 4\").    Weight as of this encounter: 71.5 kg (157 lb 9.6 oz).   Weight management plan: Discussed healthy diet and exercise guidelines    Patient Instructions       Stop lisinopril and start amlodipine.  After 2 weeks on the amlodipine, follow-up at the pharmacy for a blood pressure check.    Call for April physical appointment by January.      Return in about 6 months (around 4/20/2022) for Preventive exam, Fasting Lab Work.    Lauren Saez MD  [facility]   Kaleigh is a 61 year old who presents for the following health issues     History of Present Illness       Hyperlipidemia:  She presents for follow up of hyperlipidemia.  She is taking medication to lower cholesterol. She is not having myalgia or other side effects to statin medications.    Hypertension: She presents for follow up of hypertension.  She does check blood pressure  regularly outside of the clinic. Outpatient blood pressures have not been over 140/90.     She eats 2-3 servings of fruits and vegetables daily.She consumes 1 sweetened beverage(s) daily.She exercises with enough effort to increase her heart " rate 60 or more minutes per day.  She exercises with enough effort to increase her heart rate 5 days per week.   She is taking medications regularly.   Hypertension ROS: taking medications as instructed, no medication side effects noted, no TIA's, no chest pain on exertion, no dyspnea on exertion, no swelling of ankles.  No headache or visual changes.    Per allergy, ace inhibitors can worsen anaphylactic reactions to venom.  Recommends she be taken off of lisinopril for htn and use something else.    Needs refill of epi pen          Review of Systems   Constitutional, HEENT, cardiovascular, pulmonary, gi and gu systems are negative, except as otherwise noted.      Objective    /77   Pulse 62   Temp 97.8  F (36.6  C) (Oral)   Resp 16   Wt 71.5 kg (157 lb 9.6 oz)   SpO2 99%   BMI 27.05 kg/m    Body mass index is 27.05 kg/m .  Physical Exam   GENERAL: healthy, alert and no distress  EYES: Eyes grossly normal to inspection, PERRL and conjunctivae and sclerae normal  MS: no gross musculoskeletal defects noted, no edema  SKIN: no suspicious lesions or rashes  PSYCH: mentation appears normal, affect normal/bright

## 2021-10-20 ENCOUNTER — OFFICE VISIT (OUTPATIENT)
Dept: FAMILY MEDICINE | Facility: CLINIC | Age: 61
End: 2021-10-20
Payer: COMMERCIAL

## 2021-10-20 VITALS
TEMPERATURE: 97.8 F | SYSTOLIC BLOOD PRESSURE: 135 MMHG | DIASTOLIC BLOOD PRESSURE: 77 MMHG | WEIGHT: 157.6 LBS | HEART RATE: 62 BPM | BODY MASS INDEX: 27.05 KG/M2 | OXYGEN SATURATION: 99 % | RESPIRATION RATE: 16 BRPM

## 2021-10-20 DIAGNOSIS — T63.441D ANAPHYLACTIC REACTION TO BEE STING, ACCIDENTAL OR UNINTENTIONAL, SUBSEQUENT ENCOUNTER: ICD-10-CM

## 2021-10-20 DIAGNOSIS — I10 HYPERTENSION GOAL BP (BLOOD PRESSURE) < 140/90: Primary | ICD-10-CM

## 2021-10-20 DIAGNOSIS — T78.2XXD ANAPHYLACTIC REACTION TO BEE STING, ACCIDENTAL OR UNINTENTIONAL, SUBSEQUENT ENCOUNTER: ICD-10-CM

## 2021-10-20 PROCEDURE — 99213 OFFICE O/P EST LOW 20 MIN: CPT | Performed by: FAMILY MEDICINE

## 2021-10-20 RX ORDER — EPINEPHRINE 0.3 MG/.3ML
0.3 INJECTION SUBCUTANEOUS ONCE
Qty: 0.6 ML | Refills: 11 | Status: SHIPPED | OUTPATIENT
Start: 2021-10-20 | End: 2023-03-23

## 2021-10-20 RX ORDER — AMLODIPINE BESYLATE 5 MG/1
5 TABLET ORAL DAILY
Qty: 30 TABLET | Refills: 1 | Status: SHIPPED | OUTPATIENT
Start: 2021-10-20 | End: 2021-12-12

## 2021-10-20 ASSESSMENT — PAIN SCALES - GENERAL: PAINLEVEL: NO PAIN (0)

## 2021-10-20 NOTE — PATIENT INSTRUCTIONS
Stop lisinopril and start amlodipine.  After 2 weeks on the amlodipine, follow-up at the pharmacy for a blood pressure check.    Call for April physical appointment by January.

## 2021-10-20 NOTE — NURSING NOTE
"Chief Complaint   Patient presents with     Hypertension     Lipids       Initial /77   Pulse 62   Temp 97.8  F (36.6  C) (Oral)   Resp 16   Wt 71.5 kg (157 lb 9.6 oz)   SpO2 99%   BMI 27.05 kg/m   Estimated body mass index is 27.05 kg/m  as calculated from the following:    Height as of 8/19/21: 1.626 m (5' 4\").    Weight as of this encounter: 71.5 kg (157 lb 9.6 oz).  Medication Reconciliation: complete  Mik Roberson CMA    "

## 2021-10-22 ENCOUNTER — ALLIED HEALTH/NURSE VISIT (OUTPATIENT)
Dept: ALLERGY | Facility: CLINIC | Age: 61
End: 2021-10-22
Payer: COMMERCIAL

## 2021-10-22 DIAGNOSIS — T63.441D TOXIC EFFECT OF VENOM OF BEES, UNINTENTIONAL, SUBSEQUENT ENCOUNTER: Primary | ICD-10-CM

## 2021-10-22 PROCEDURE — 95117 IMMUNOTHERAPY INJECTIONS: CPT

## 2021-10-22 NOTE — PROGRESS NOTES
Patient presented after waiting 30 minutes with no reaction to allergy injections. Discharged from clinic.    Latha Ding RN............   10/22/2021...9:21 AM

## 2021-11-08 ENCOUNTER — ALLIED HEALTH/NURSE VISIT (OUTPATIENT)
Dept: FAMILY MEDICINE | Facility: CLINIC | Age: 61
End: 2021-11-08
Payer: COMMERCIAL

## 2021-11-08 VITALS — DIASTOLIC BLOOD PRESSURE: 76 MMHG | SYSTOLIC BLOOD PRESSURE: 130 MMHG | HEART RATE: 72 BPM

## 2021-11-08 DIAGNOSIS — Z01.30 BLOOD PRESSURE CHECK: Primary | ICD-10-CM

## 2021-11-08 PROCEDURE — 99207 PR NO CHARGE NURSE ONLY: CPT | Performed by: FAMILY MEDICINE

## 2021-11-08 NOTE — PROGRESS NOTES
Kaleigh Tyson was evaluated at [facility] on November 8, 2021 at which time her blood pressure was:    BP Readings from Last 3 Encounters:   11/08/21 130/76   10/20/21 135/77   09/10/21 136/74     Pulse Readings from Last 3 Encounters:   11/08/21 72   10/20/21 62   09/10/21 68       Reviewed lifestyle modifications for blood pressure control and reduction: including making healthy food choices, managing weight, getting regular exercise, smoking cessation, reducing alcohol consumption, monitoring blood pressure regularly.     Symptoms: None    BP Goal:< 140/90 mmHg    BP Assessment:  BP at goal    Potential Reasons for BP too high: NA - Not applicable    BP Follow-Up Plan: Recheck BP in 6 months at pharmacy    Recommendation to Provider: None    Note completed by: Jose Parikh RPh.  [facility]  [phone]

## 2021-12-11 DIAGNOSIS — I10 HYPERTENSION GOAL BP (BLOOD PRESSURE) < 140/90: ICD-10-CM

## 2021-12-12 RX ORDER — AMLODIPINE BESYLATE 5 MG/1
5 TABLET ORAL DAILY
Qty: 90 TABLET | Refills: 0 | Status: SHIPPED | OUTPATIENT
Start: 2021-12-12 | End: 2022-03-10

## 2021-12-16 ENCOUNTER — ALLIED HEALTH/NURSE VISIT (OUTPATIENT)
Dept: ALLERGY | Facility: CLINIC | Age: 61
End: 2021-12-16
Payer: COMMERCIAL

## 2021-12-16 DIAGNOSIS — T63.441D TOXIC EFFECT OF VENOM OF BEES, UNINTENTIONAL, SUBSEQUENT ENCOUNTER: Primary | ICD-10-CM

## 2021-12-16 PROCEDURE — 99207 PR DROP WITH A PROCEDURE: CPT

## 2021-12-16 PROCEDURE — 95117 IMMUNOTHERAPY INJECTIONS: CPT

## 2021-12-16 NOTE — PROGRESS NOTES
Patient presented after waiting 30 minutes with no reaction to allergy injections. Discharged from clinic.    Richard MONTOYA RN ............   12/16/2021...4:00 PM

## 2022-02-08 ENCOUNTER — ALLIED HEALTH/NURSE VISIT (OUTPATIENT)
Dept: ALLERGY | Facility: CLINIC | Age: 62
End: 2022-02-08
Payer: COMMERCIAL

## 2022-02-08 DIAGNOSIS — T63.441D TOXIC EFFECT OF VENOM OF BEES, UNINTENTIONAL, SUBSEQUENT ENCOUNTER: Primary | ICD-10-CM

## 2022-02-08 PROCEDURE — 95117 IMMUNOTHERAPY INJECTIONS: CPT

## 2022-02-08 NOTE — PROGRESS NOTES
Patient presented after waiting 30 minutes with no reaction to allergy injections. Discharged from clinic.    Sonia PEDERSEN RN

## 2022-03-10 DIAGNOSIS — I10 HYPERTENSION GOAL BP (BLOOD PRESSURE) < 140/90: ICD-10-CM

## 2022-03-10 RX ORDER — AMLODIPINE BESYLATE 5 MG/1
5 TABLET ORAL DAILY
Qty: 90 TABLET | Refills: 0 | Status: SHIPPED | OUTPATIENT
Start: 2022-03-10 | End: 2022-05-22

## 2022-03-25 NOTE — TELEPHONE ENCOUNTER
The patient has an OV appointment on 10/12/20.  RN:  Refill needed please.  Eli Jones,      Orientation to room/Bed in low position, brakes on/Side rails x 2 or 4 up, assess large gaps, such that a patient could get extremity or other body part entrapped, use additional safety procedures/Use of non-skid footwear for ambulating patients, use of appropriate size clothing to prevent risk of tripping/Patient and family education available to parents and patient/Document fall prevention teaching and include in plan of care/Identify patient with a "humpty dumpty sticker" on the patient, in the bed and in patient chart/Educate patient/parents of falls protocol precautions/Keep bed in the lowest position, unless patient is directly attended/Document in nursing narrative teaching and plan of care

## 2022-04-05 ENCOUNTER — ALLIED HEALTH/NURSE VISIT (OUTPATIENT)
Dept: ALLERGY | Facility: CLINIC | Age: 62
End: 2022-04-05

## 2022-04-05 DIAGNOSIS — T63.441D TOXIC EFFECT OF VENOM OF BEES, UNINTENTIONAL, SUBSEQUENT ENCOUNTER: Primary | ICD-10-CM

## 2022-04-05 PROCEDURE — 95117 IMMUNOTHERAPY INJECTIONS: CPT

## 2022-04-05 NOTE — PROGRESS NOTES
Patient presented after waiting 30 minutes with no reaction to allergy injections. Discharged from clinic.    Patient advised her allergy serums are going to  2022.  Patient states she is getting new insurance on May 1, 2022 and would like to wait to place the allergy serum order until the new insurance is in effect.  Advised patient to call or send a [facility] chart message when she would like to order the new allergy serums so we can place her on a schedule to come in and sign the consent form.  Advised patient that it takes 2-3 weeks for the allergy serums to arrive once the order has been placed.  Patient verbalized good understanding.    Sonia PEDERSEN RN

## 2022-04-16 DIAGNOSIS — Z91.89 10 YEAR RISK OF MI OR STROKE 7.5% OR GREATER: ICD-10-CM

## 2022-04-18 RX ORDER — ATORVASTATIN CALCIUM 10 MG/1
10 TABLET, FILM COATED ORAL DAILY
Qty: 90 TABLET | Refills: 0 | Status: SHIPPED | OUTPATIENT
Start: 2022-04-18 | End: 2022-05-22

## 2022-04-18 NOTE — TELEPHONE ENCOUNTER
Routing refill request to provider for review/approval because:  Labs not current:  Lipids    Next 5 appointments (look out 90 days)    May 23, 2022  2:30 PM  (Arrive by 2:10 PM)  Adult Preventative Visit with Lauren Saez MD  [facility] ([facility] [address] [facility] [address]  [phone]        Evelyn Balderas BSN, RN

## 2022-05-02 ENCOUNTER — TELEPHONE (OUTPATIENT)
Dept: ALLERGY | Facility: CLINIC | Age: 62
End: 2022-05-02
Payer: COMMERCIAL

## 2022-05-02 NOTE — TELEPHONE ENCOUNTER
Pt called clinic to give verbal okay to order Venom serums since she now has health insurance. Told pt that provider is not in clinic today to get the verbal okay, and that we can call pt at around 2:45 pm on Thursday this weel to get pts verbal ok. Pt confirmed understanding. Will wait.    Madelyn WALTERS MA

## 2022-05-03 NOTE — TELEPHONE ENCOUNTER
Patient called allergy clinic phone.  Patient was inquiring about phone conversation on 5-2-2022.    Reiterated to patient that in order to take a verbal consent to order allergy serums over the phone we must have the allergist be a witness.  Advised patient that Dr. Garcia is not currently in clinic.  Advised patient that there is now an allergy acknowledgement form that patient's must sign to proceed with allergy shots.  Patient has an appointment with Dr. Garcia and states she would prefer to sign it at her clinic appointment so she doesn't have to make another trip to the clinic.  Patient states staff can call her on 5-5-2022 anytime after 2:15pm.    Sonia PEDERSEN RN 5/3/2022 5:18 PM

## 2022-05-05 DIAGNOSIS — T78.2XXD ANAPHYLAXIS DUE TO HYMENOPTERA VENOM, ACCIDENTAL OR UNINTENTIONAL, SUBSEQUENT ENCOUNTER: ICD-10-CM

## 2022-05-05 DIAGNOSIS — T63.481D ANAPHYLAXIS DUE TO HYMENOPTERA VENOM, ACCIDENTAL OR UNINTENTIONAL, SUBSEQUENT ENCOUNTER: ICD-10-CM

## 2022-05-05 NOTE — TELEPHONE ENCOUNTER
Called patient.    Advised patient that she can sign the allergy shot acknowledgement form at her appointment with Dr. Garcia.    Telephone consent obtained to reorder venom serums.  Witnessed by Dr. Garcia.    Sonia PEDERSEN RN 5/5/2022 2:30 PM

## 2022-05-05 NOTE — TELEPHONE ENCOUNTER
ALLERGY SOLUTION RE-ORDER REQUEST    Kaleigh Tyson 1960 MRN: [id number]    DATE NEEDED:  5-    Vial Color Content    Vial Size  Red 1:1 Wasp    13 mL  Red 1:1 Mixed Vespid   13 mL      Serum reorder consent signed and patient/parent was advised that new serums would be ordered through the pharmacy and billed to their insurance company when they arrive in clinic. Yes    Shot Clinic Location:  [place]  Ship to Location: [place]  Serum billed to:  [place]    Requester Signature  Sonia PEDERSEN RN 5/5/2022 2:32 PM

## 2022-05-06 NOTE — TELEPHONE ENCOUNTER
New prescription needed, last one written on 4/14/2021 by Dr Estes.     Please route back to allergy serum compounding.     Damari PHILIPPE RN  Specialty/Allergy Clinics

## 2022-05-07 ENCOUNTER — ALLIED HEALTH/NURSE VISIT (OUTPATIENT)
Dept: FAMILY MEDICINE | Facility: CLINIC | Age: 62
End: 2022-05-07
Payer: COMMERCIAL

## 2022-05-07 VITALS — HEART RATE: 65 BPM | SYSTOLIC BLOOD PRESSURE: 134 MMHG | DIASTOLIC BLOOD PRESSURE: 72 MMHG

## 2022-05-07 DIAGNOSIS — Z01.30 BLOOD PRESSURE CHECK: Primary | ICD-10-CM

## 2022-05-07 PROCEDURE — 99207 PR DROP WITH A PROCEDURE: CPT | Performed by: FAMILY MEDICINE

## 2022-05-07 NOTE — PROGRESS NOTES
Kaleigh Tyson was evaluated at [facility] on May 7, 2022 at which time her blood pressure was:    BP Readings from Last 3 Encounters:   05/07/22 134/72   11/08/21 130/76   10/20/21 135/77     Pulse Readings from Last 3 Encounters:   05/07/22 65   11/08/21 72   10/20/21 62       Reviewed lifestyle modifications for blood pressure control and reduction: including making healthy food choices, managing weight, getting regular exercise, smoking cessation, reducing alcohol consumption, monitoring blood pressure regularly.     Symptoms: None    BP Goal:< 140/90 mmHg    BP Assessment:  BP at goal    Potential Reasons for BP too high: NA - Not applicable    BP Follow-Up Plan: recheck in 3 months     Recommendation to Provider: n/a      Note completed by: Jessa Varela, Pharm D  [facility]  [phone]

## 2022-05-12 DIAGNOSIS — T63.461A TOXIC EFFECT OF VENOM OF WASPS, UNINTENTIONAL, INITIAL ENCOUNTER: Primary | ICD-10-CM

## 2022-05-12 PROCEDURE — 95145 ANTIGEN THERAPY SERVICES: CPT | Performed by: ALLERGY & IMMUNOLOGY

## 2022-05-12 NOTE — PROGRESS NOTES
Allergy serums billed to [facility].     Vials billed below:    Vial Color Content                      Vial Size Expiration Date  Red 1:1 Wasp 13mL  5/12/23    Billed 10 units    Checked by Armen Gonzales / LENIN          Signature  Armen Gonzales LPN

## 2022-05-13 DIAGNOSIS — T63.451A TOXIC EFFECT OF VENOM OF HORNETS, UNINTENTIONAL, INITIAL ENCOUNTER: Primary | ICD-10-CM

## 2022-05-13 PROCEDURE — 95147 ANTIGEN THERAPY SERVICES: CPT | Performed by: ALLERGY & IMMUNOLOGY

## 2022-05-13 NOTE — PROGRESS NOTES
Allergy serums billed to [facility].     Vials billed below:    Vial Color Content                      Vial Size Expiration Date  Red 1:1 Mixed Vespid 13mL  05/13/2023    Billed 10 units    Checked by Armen Gonzales / LPN        Signature  Armen Gonzales LPN

## 2022-05-16 NOTE — PATIENT INSTRUCTIONS

## 2022-05-16 NOTE — PROGRESS NOTES
SUBJECTIVE:   CC: Kaleigh Tyson is an 61 year old woman who presents for preventive health visit.       Patient has been advised of split billing requirements and indicates understanding: Yes  Healthy Habits:     Getting at least 3 servings of Calcium per day:  Yes    Bi-annual eye exam:  NO    Dental care twice a year:  Yes    Sleep apnea or symptoms of sleep apnea:  None    Diet:  Regular (no restrictions)    Frequency of exercise:  2-3 days/week    Duration of exercise:  Greater than 60 minutes    Taking medications regularly:  Yes    Medication side effects:  None    PHQ-2 Total Score: 0              Today's PHQ-2 Score:   PHQ-2 (  [employer]) 2022   Q1: Little interest or pleasure in doing things 0   Q2: Feeling down, depressed or hopeless 0   PHQ-2 Score 0   PHQ-2 Total Score (12-17 Years)- Positive if 3 or more points; Administer PHQ-A if positive -   Q1: Little interest or pleasure in doing things Not at all   Q2: Feeling down, depressed or hopeless Not at all   PHQ-2 Score 0       Abuse: Current or Past (Physical, Sexual or Emotional) - No  Do you feel safe in your environment? Yes        Social History     Tobacco Use     Smoking status: Light Tobacco Smoker     Packs/day: 0.50     Years: 5.00     Pack years: 2.50     Types: Cigarettes, Cigarettes     Start date: 1985     Last attempt to quit: 2008     Years since quittin.4     Smokeless tobacco: Never Used   Substance Use Topics     Alcohol use: Yes     Comment: rare         Alcohol Use 2022   Prescreen: >3 drinks/day or >7 drinks/week? No   Prescreen: >3 drinks/day or >7 drinks/week? -       Reviewed orders with patient.  Reviewed health maintenance and updated orders accordingly - Yes    G 1 P 1   No LMP recorded. Patient is postmenopausal.     Fasting: Yes    Td: tdap 2016       Flu: 10/2021      Covid: Mod boost 2021      Shingrix: done      PPV: NA      Last 3 Pap and HPV Results:   PAP / HPV Latest Ref Rng & Units  3/25/2020 3/21/2019 2016   PAP (Historical) - NIL NIL NIL   HPV16 NEG:Negative Negative Negative -   HPV18 NEG:Negative Negative Negative -   HRHPV NEG:Negative Negative Positive(A) -                  Cholesterol:   Lab Results   Component Value Date    CHOL 214 2021     Lab Results   Component Value Date    HDL 56 2021     Lab Results   Component Value Date     2021     Lab Results   Component Value Date    TRIG 155 2021     Lab Results   Component Value Date    CHOLHDLRATIO 6.1 2009         MM2020  Dexa:  NA     Flex/colo: 10/28/2020 CG q3y      Seat Belt: Yes    Sunscreen use: Yes   Calcium Intake: adeq  Health Care Directive: No  Sexually Active: Yes     Current contraception: none  History of abnormal Pap smear: No  Family history of colon/breast/ovarian cancer: No  Regular self breast exam: Yes  History of abnormal mammogram: No      Labs reviewed in EPIC  BP Readings from Last 3 Encounters:   22 (!) 146/76   22 134/72   21 130/76    Wt Readings from Last 3 Encounters:   22 70.9 kg (156 lb 6.4 oz)   10/20/21 71.5 kg (157 lb 9.6 oz)   21 70.3 kg (155 lb)                  Patient Active Problem List   Diagnosis     CARDIOVASCULAR SCREENING; LDL GOAL LESS THAN 160     Anaphylaxis, subsequent encounter     Cervical high risk HPV (human papillomavirus) test positive     Hypertension goal BP (blood pressure) < 140/90     Prediabetes     Hyperlipidemia LDL goal <130     Past Surgical History:   Procedure Laterality Date     NO HISTORY OF SURGERY         Social History     Tobacco Use     Smoking status: Light Tobacco Smoker     Packs/day: 0.50     Years: 5.00     Pack years: 2.50     Types: Cigarettes, Cigarettes     Start date: 1985     Last attempt to quit: 2008     Years since quittin.4     Smokeless tobacco: Never Used   Substance Use Topics     Alcohol use: Yes     Comment: rare     Family History   Problem Relation Age of  Onset     Heart Disease Father      Dementia Father      Diabetes Father      Neurologic Disorder Brother      Lipids Sister      Hyperlipidemia Sister      Lipids Sister      Myocardial Infarction Mother      Hypertension Mother      Hyperlipidemia Brother          Current Outpatient Medications   Medication Sig Dispense Refill     amLODIPine (NORVASC) 5 MG tablet Take 1 tablet (5 mg) by mouth daily 90 tablet 1     atorvastatin (LIPITOR) 10 MG tablet Take 1 tablet (10 mg) by mouth daily 90 tablet 3     ORDER FOR ALLERGEN IMMUNOTHERAPY Mixed Vespid Venom 300 mcg/mL HS 12 ml  Diluent: HSA qs to 5ml 12 mL prn     ORDER FOR ALLERGEN IMMUNOTHERAPY Wasp Venom 100 mcg/mL HS 12 ml  Diluent: HSA qs to 5ml 12 mL prn       Breast Cancer Screening:    Breast CA Risk Assessment (FHS-7) 4/8/2021   Do you have a family history of breast, colon, or ovarian cancer? No / Unknown         Mammogram Screening: Recommended mammography every 1-2 years with patient discussion and risk factor consideration  Pertinent mammograms are reviewed under the imaging tab.      Reviewed and updated as needed this visit by clinical staff   Tobacco  Allergies  Meds  Problems  Med Hx  Surg Hx  Fam Hx  Soc   Hx          Reviewed and updated as needed this visit by Provider   Tobacco  Allergies  Meds  Problems  Med Hx  Surg Hx  Fam Hx               Review of Systems   Constitutional: Negative for chills and fever.   HENT: Negative for congestion, ear pain, hearing loss and sore throat.    Eyes: Negative for pain and visual disturbance.   Respiratory: Negative for cough and shortness of breath.    Cardiovascular: Negative for chest pain, palpitations and peripheral edema.   Gastrointestinal: Negative for abdominal pain, constipation, diarrhea, heartburn, hematochezia and nausea.   Breasts:  Negative for tenderness, breast mass and discharge.   Genitourinary: Negative for dysuria, frequency, genital sores, hematuria, pelvic pain, urgency,  "vaginal bleeding and vaginal discharge.   Musculoskeletal: Negative for arthralgias, joint swelling and myalgias.   Skin: Negative for rash.   Neurological: Negative for dizziness, weakness, headaches and paresthesias.   Psychiatric/Behavioral: Negative for mood changes. The patient is not nervous/anxious.           OBJECTIVE:   BP (!) 146/76   Pulse 64   Temp 98.5  F (36.9  C) (Oral)   Resp 16   Ht 1.626 m (5' 4\")   Wt 70.9 kg (156 lb 6.4 oz)   SpO2 98%   BMI 26.85 kg/m    Physical Exam  GENERAL APPEARANCE: healthy, alert and no distress  EYES: Eyes grossly normal to inspection, PERRL and conjunctivae and sclerae normal  HENT: ear canals and TM's normal, nose and mouth without ulcers or lesions, oropharynx clear and oral mucous membranes moist  NECK: no adenopathy, no asymmetry, masses, or scars and thyroid normal to palpation  RESP: lungs clear to auscultation - no rales, rhonchi or wheezes  BREAST: normal without masses, tenderness or nipple discharge and no palpable axillary masses or adenopathy  CV: regular rate and rhythm, normal S1 S2, no S3 or S4, no murmur, click or rub, no peripheral edema and peripheral pulses strong  ABDOMEN: soft, nontender, no hepatosplenomegaly, no masses and bowel sounds normal  MS: no musculoskeletal defects are noted and gait is age appropriate without ataxia  SKIN: no suspicious lesions or rashes  NEURO: Normal strength and tone, sensory exam grossly normal, mentation intact and speech normal  PSYCH: mentation appears normal and affect normal/bright    Diagnostic Test Results:  Labs reviewed in Epic    ASSESSMENT/PLAN:   (Z00.00) Routine general medical examination at a health care facility  (primary encounter diagnosis)  Comment: preventive needs reviewed   Plan: see orders in Epic.     (Z91.89) 10 year risk of MI or stroke 7.5% or greater  Comment: doing well  Plan: Lipid panel reflex to direct LDL Fasting,         atorvastatin (LIPITOR) 10 MG tablet,         OFFICE/OUTPT " "VISIT,EST,LEVL IV        Refill x 1 yr     (I10) Hypertension goal BP (blood pressure) < 140/90  Comment: not to goal today, at goal at home on validated cuff  Plan: BASIC METABOLIC PANEL, amLODIPine (NORVASC) 5         MG tablet, OFFICE/OUTPT VISIT,EST,LEVL IV        Continue amlodipine 5 mg daily, send home readings within 2 weeks. If still above goal,plan to increase amlodipine to 10 mg.          COUNSELING:  Reviewed preventive health counseling, as reflected in patient instructions  Special attention given to:        Regular exercise       Healthy diet/nutrition    Estimated body mass index is 26.85 kg/m  as calculated from the following:    Height as of this encounter: 1.626 m (5' 4\").    Weight as of this encounter: 70.9 kg (156 lb 6.4 oz).    Weight management plan: Discussed healthy diet and exercise guidelines    She reports that she has been smoking cigarettes and cigarettes. She started smoking about 36 years ago. She has a 2.50 pack-year smoking history. She has never used smokeless tobacco.  Tobacco Cessation Action Plan:   Information offered: Patient not interested at this time      Counseling Resources:  ATP IV Guidelines  Pooled Cohorts Equation Calculator  Breast Cancer Risk Calculator  BRCA-Related Cancer Risk Assessment: FHS-7 Tool  FRAX Risk Assessment  ICSI Preventive Guidelines  Dietary Guidelines for Americans, 2010  USDA's MyPlate  ASA Prophylaxis  Lung CA Screening    Lauren Saez MD  [facility]  "

## 2022-05-17 NOTE — TELEPHONE ENCOUNTER
Allergy serums received at [facility].     Vials received below:    Vial Color Content                      Vial Size Expiration Date  Red 1:1 Wasp 13mL  5/12/2023  Red 1:1 Mixed Vespid 13mL  5/13/2023        Signature  Madelyn WALTERS MA

## 2022-05-20 ASSESSMENT — ENCOUNTER SYMPTOMS
ARTHRALGIAS: 0
CHILLS: 0
BREAST MASS: 0
SORE THROAT: 0
HEMATURIA: 0
JOINT SWELLING: 0
DIARRHEA: 0
HEARTBURN: 0
COUGH: 0
PALPITATIONS: 0
DIZZINESS: 0
CONSTIPATION: 0
FEVER: 0
HEADACHES: 0
ABDOMINAL PAIN: 0
NAUSEA: 0
SHORTNESS OF BREATH: 0
MYALGIAS: 0
WEAKNESS: 0
EYE PAIN: 0
PARESTHESIAS: 0
FREQUENCY: 0
HEMATOCHEZIA: 0
DYSURIA: 0
NERVOUS/ANXIOUS: 0

## 2022-05-22 PROBLEM — M25.561 RIGHT KNEE PAIN, UNSPECIFIED CHRONICITY: Status: RESOLVED | Noted: 2018-08-08 | Resolved: 2022-05-22

## 2022-05-22 ASSESSMENT — ENCOUNTER SYMPTOMS
HEMATURIA: 0
COUGH: 0
NERVOUS/ANXIOUS: 0
SORE THROAT: 0
FREQUENCY: 0
ABDOMINAL PAIN: 0
PARESTHESIAS: 0
PALPITATIONS: 0
HEADACHES: 0
JOINT SWELLING: 0
DYSURIA: 0
NAUSEA: 0
WEAKNESS: 0
HEMATOCHEZIA: 0
CHILLS: 0
EYE PAIN: 0
BREAST MASS: 0
DIZZINESS: 0
SHORTNESS OF BREATH: 0
ARTHRALGIAS: 0
FEVER: 0
CONSTIPATION: 0
DIARRHEA: 0
HEARTBURN: 0
MYALGIAS: 0

## 2022-05-23 ENCOUNTER — OFFICE VISIT (OUTPATIENT)
Dept: FAMILY MEDICINE | Facility: CLINIC | Age: 62
End: 2022-05-23
Payer: COMMERCIAL

## 2022-05-23 VITALS
TEMPERATURE: 98.5 F | BODY MASS INDEX: 26.7 KG/M2 | OXYGEN SATURATION: 98 % | WEIGHT: 156.4 LBS | DIASTOLIC BLOOD PRESSURE: 76 MMHG | RESPIRATION RATE: 16 BRPM | SYSTOLIC BLOOD PRESSURE: 146 MMHG | HEART RATE: 64 BPM | HEIGHT: 64 IN

## 2022-05-23 DIAGNOSIS — Z91.89 10 YEAR RISK OF MI OR STROKE 7.5% OR GREATER: ICD-10-CM

## 2022-05-23 DIAGNOSIS — I10 HYPERTENSION GOAL BP (BLOOD PRESSURE) < 140/90: ICD-10-CM

## 2022-05-23 DIAGNOSIS — Z00.00 ROUTINE GENERAL MEDICAL EXAMINATION AT A HEALTH CARE FACILITY: Primary | ICD-10-CM

## 2022-05-23 LAB
ANION GAP SERPL CALCULATED.3IONS-SCNC: 7 MMOL/L (ref 3–14)
BUN SERPL-MCNC: 11 MG/DL (ref 7–30)
CALCIUM SERPL-MCNC: 9.5 MG/DL (ref 8.5–10.1)
CHLORIDE BLD-SCNC: 106 MMOL/L (ref 94–109)
CHOLEST SERPL-MCNC: 160 MG/DL
CO2 SERPL-SCNC: 24 MMOL/L (ref 20–32)
CREAT SERPL-MCNC: 0.79 MG/DL (ref 0.52–1.04)
FASTING STATUS PATIENT QL REPORTED: YES
GFR SERPL CREATININE-BSD FRML MDRD: 86 ML/MIN/1.73M2
GLUCOSE BLD-MCNC: 93 MG/DL (ref 70–99)
HDLC SERPL-MCNC: 50 MG/DL
LDLC SERPL CALC-MCNC: 84 MG/DL
NONHDLC SERPL-MCNC: 110 MG/DL
POTASSIUM BLD-SCNC: 3.9 MMOL/L (ref 3.4–5.3)
SODIUM SERPL-SCNC: 137 MMOL/L (ref 133–144)
TRIGL SERPL-MCNC: 132 MG/DL

## 2022-05-23 PROCEDURE — 99396 PREV VISIT EST AGE 40-64: CPT | Performed by: FAMILY MEDICINE

## 2022-05-23 PROCEDURE — 80061 LIPID PANEL: CPT | Performed by: FAMILY MEDICINE

## 2022-05-23 PROCEDURE — 36415 COLL VENOUS BLD VENIPUNCTURE: CPT | Performed by: FAMILY MEDICINE

## 2022-05-23 PROCEDURE — 99213 OFFICE O/P EST LOW 20 MIN: CPT | Mod: 25 | Performed by: FAMILY MEDICINE

## 2022-05-23 PROCEDURE — 80048 BASIC METABOLIC PNL TOTAL CA: CPT | Performed by: FAMILY MEDICINE

## 2022-05-23 RX ORDER — AMLODIPINE BESYLATE 5 MG/1
5 TABLET ORAL DAILY
Qty: 90 TABLET | Refills: 1 | Status: SHIPPED | OUTPATIENT
Start: 2022-05-23 | End: 2022-11-13

## 2022-05-23 RX ORDER — ATORVASTATIN CALCIUM 10 MG/1
10 TABLET, FILM COATED ORAL DAILY
Qty: 90 TABLET | Refills: 3 | Status: SHIPPED | OUTPATIENT
Start: 2022-05-23 | End: 2023-05-24

## 2022-05-23 ASSESSMENT — PAIN SCALES - GENERAL: PAINLEVEL: NO PAIN (0)

## 2022-05-23 NOTE — NURSING NOTE
"Chief Complaint   Patient presents with     Physical       Initial BP (!) 150/85   Pulse 64   Temp 98.5  F (36.9  C) (Oral)   Resp 16   Ht 1.626 m (5' 4\")   Wt 70.9 kg (156 lb 6.4 oz)   SpO2 98%   BMI 26.85 kg/m   Estimated body mass index is 26.85 kg/m  as calculated from the following:    Height as of this encounter: 1.626 m (5' 4\").    Weight as of this encounter: 70.9 kg (156 lb 6.4 oz).  Medication Reconciliation: complete  Mik Roberson CMA    "

## 2022-05-30 ENCOUNTER — MYC MEDICAL ADVICE (OUTPATIENT)
Dept: FAMILY MEDICINE | Facility: CLINIC | Age: 62
End: 2022-05-30
Payer: COMMERCIAL

## 2022-05-31 ENCOUNTER — ALLIED HEALTH/NURSE VISIT (OUTPATIENT)
Dept: ALLERGY | Facility: CLINIC | Age: 62
End: 2022-05-31
Payer: COMMERCIAL

## 2022-05-31 ENCOUNTER — OFFICE VISIT (OUTPATIENT)
Dept: ALLERGY | Facility: CLINIC | Age: 62
End: 2022-05-31
Payer: COMMERCIAL

## 2022-05-31 VITALS — DIASTOLIC BLOOD PRESSURE: 82 MMHG | SYSTOLIC BLOOD PRESSURE: 144 MMHG | OXYGEN SATURATION: 99 % | HEART RATE: 71 BPM

## 2022-05-31 DIAGNOSIS — T78.2XXA ANAPHYLAXIS DUE TO INSECT VENOM: Primary | ICD-10-CM

## 2022-05-31 DIAGNOSIS — T63.441D TOXIC EFFECT OF VENOM OF BEES, UNINTENTIONAL, SUBSEQUENT ENCOUNTER: Primary | ICD-10-CM

## 2022-05-31 DIAGNOSIS — T63.481A ANAPHYLAXIS DUE TO INSECT VENOM: Primary | ICD-10-CM

## 2022-05-31 PROCEDURE — 95117 IMMUNOTHERAPY INJECTIONS: CPT

## 2022-05-31 PROCEDURE — 99213 OFFICE O/P EST LOW 20 MIN: CPT | Mod: 25 | Performed by: ALLERGY & IMMUNOLOGY

## 2022-05-31 ASSESSMENT — ENCOUNTER SYMPTOMS
RHINORRHEA: 0
MYALGIAS: 0
DIARRHEA: 0
FACIAL SWELLING: 0
EYE ITCHING: 0
ARTHRALGIAS: 0
EYE REDNESS: 0
HEADACHES: 0
VOMITING: 0
JOINT SWELLING: 0
CHILLS: 0
COUGH: 0
CHEST TIGHTNESS: 0
WHEEZING: 0
EYE DISCHARGE: 0
FEVER: 0
SINUS PRESSURE: 0
ACTIVITY CHANGE: 0
SHORTNESS OF BREATH: 0
ADENOPATHY: 0
NAUSEA: 0

## 2022-05-31 NOTE — PROGRESS NOTES
Patient presented after waiting 30 minutes with normal reaction to  injections. Discharged from clinic.    Diann MONTANEZ RN, Specialty Clinic 05/31/22 4:41 PM

## 2022-05-31 NOTE — TELEPHONE ENCOUNTER
See blood pressure values.     different times times throughout 4 days and my readings were good 129-76, 125-78, 128-78, 137-76    Lidia Olivares RN

## 2022-05-31 NOTE — LETTER
2022         RE: Kaleigh Tyson  [address] [facility] [address]        Dear Colleague,    Thank you for referring your patient, Kaleigh Tyson, to the [facility]. Please see a copy of my visit note below.    Kaleigh Tyson was seen in the Allergy Clinic at [facility].      Kaleigh Tyson is a 61 year old Choose not to answer female who is seen today for a follow-up visit. She has previously been seen by Dr. Estes for management of stinging insect allergy.    Kaleigh presented to the allergy clinic in 2018 after sustaining an anaphylactic reaction after being stung by what she believed to be a wasp. She was mowing the lawn at the time. Testing was notable for sensitization to hornets, wasps, and yellow jackets. Baseline tryptase was normal at 4.8. She began allergen immunotherapy treatment in 2018 and reached her maintenance dose in 2019. She has no history of systemic or significant local reactions to treatment. Kaleigh was stung last year and had localized itching. She took cetirizine and her symptoms resolved.      Past Medical History:   Diagnosis Date     Cervical high risk HPV (human papillomavirus) test positive 2019    see problem list     Elevated cholesterol     Low HDL - 41 in      Hypertension      Family History   Problem Relation Age of Onset     Heart Disease Father      Dementia Father      Diabetes Father      Neurologic Disorder Brother      Lipids Sister      Hyperlipidemia Sister      Lipids Sister      Myocardial Infarction Mother      Hypertension Mother      Hyperlipidemia Brother      Social History     Tobacco Use     Smoking status: Light Tobacco Smoker     Packs/day: 0.50     Years: 5.00     Pack years: 2.50     Types: Cigarettes, Cigarettes     Start date: 1985     Last attempt to quit: 2008     Years since quittin.4     Smokeless tobacco: Never Used   Vaping Use     Vaping Use: Never used   Substance Use  Topics     Alcohol use: Yes     Comment: rare     Drug use: No     Social History     Social History Narrative     Not on file       Past medical, family, and social history were reviewed.    Review of Systems   Constitutional: Negative for activity change, chills and fever.   HENT: Negative for congestion, dental problem, ear pain, facial swelling, nosebleeds, postnasal drip, rhinorrhea, sinus pressure and sneezing.    Eyes: Negative for discharge, redness and itching.   Respiratory: Negative for cough, chest tightness, shortness of breath and wheezing.    Cardiovascular: Negative for chest pain.   Gastrointestinal: Negative for diarrhea, nausea and vomiting.   Musculoskeletal: Negative for arthralgias, joint swelling and myalgias.   Skin: Negative for rash.   Neurological: Negative for headaches.   Hematological: Negative for adenopathy.   Psychiatric/Behavioral: Negative for behavioral problems and self-injury.         Current Outpatient Medications:      amLODIPine (NORVASC) 5 MG tablet, Take 1 tablet (5 mg) by mouth daily, Disp: 90 tablet, Rfl: 1     atorvastatin (LIPITOR) 10 MG tablet, Take 1 tablet (10 mg) by mouth daily, Disp: 90 tablet, Rfl: 3     ORDER FOR ALLERGEN IMMUNOTHERAPY, Mixed Vespid Venom 300 mcg/mL HS 12 ml Diluent: HSA qs to 5ml, Disp: 12 mL, Rfl: prn     ORDER FOR ALLERGEN IMMUNOTHERAPY, Wasp Venom 100 mcg/mL HS 12 ml Diluent: HSA qs to 5ml, Disp: 12 mL, Rfl: prn  Allergies   Allergen Reactions     Bees Hives and Swelling     Pyridoxine Hives     Other reaction(s): Angioedema       EXAM:   BP (!) 169/79 (BP Location: Right arm, Patient Position: Sitting, Cuff Size: Adult Regular)   Pulse 71   SpO2 99%   Physical Exam  Vitals and nursing note reviewed.   Constitutional:       Appearance: Normal appearance.   HENT:      Head: Normocephalic and atraumatic.      Right Ear: External ear normal.      Left Ear: External ear normal.      Nose: No nasal deformity.      Mouth/Throat:      Mouth:  Mucous membranes are moist. No oral lesions.      Pharynx: Oropharynx is clear. Uvula midline. No posterior oropharyngeal erythema.   Eyes:      General: Lids are normal. No scleral icterus.     Extraocular Movements: Extraocular movements intact.      Conjunctiva/sclera: Conjunctivae normal.   Neck:      Comments: Symmetric, no scars or lesions  Cardiovascular:      Rate and Rhythm: Normal rate and regular rhythm.      Heart sounds: Normal heart sounds, S1 normal and S2 normal.   Pulmonary:      Effort: Pulmonary effort is normal. No prolonged expiration or respiratory distress.      Breath sounds: Normal breath sounds and air entry.   Musculoskeletal:      Cervical back: Normal range of motion and neck supple.      Comments: No musculoskeletal defects noted   Skin:     Findings: No lesion or rash.   Neurological:      General: No focal deficit present.      Mental Status: She is alert.   Psychiatric:         Mood and Affect: Mood and affect normal.         Behavior: Behavior normal.         WORKUP:  None    ASSESSMENT/PLAN:  Kaleigh Tyson is a 61 year old female here for a follow-up visit.    1. 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 She has completed 3 years of venom immunotherapy treatment at her maintenance dose and is doing well. She has been stung during this time and has developed only localized itching without any other significant symptoms. We reviewed the risks and benefits of continuing venom immunotherapy treatment along with the recommended duration of treatment.     - continue venom immunotherapy per protocol - recommend completion of a minimum of 5 years of treatment at maintenance dose though lifelong therapy may be considered  - use epinephrine auto-injector as directed for severe allergic reactions      Follow-up in 1 year, sooner if needed      Thank you for allowing me to participate in the care of  Kaleigh Tyson.      Mary Lou Garcai MD, FAAAAI  Allergy/Immunology  [facility]      Chart documentation done in part with Dragon Voice Recognition Software. Although reviewed after completion, some word and grammatical errors may remain.      Again, thank you for allowing me to participate in the care of your patient.        Sincerely,        Mary Lou Garcia MD

## 2022-05-31 NOTE — PROGRESS NOTES
Kaleigh Tyson was seen in the Allergy Clinic at [facility].      Kaleigh Tyson is a 61 year old Choose not to answer female who is seen today for a follow-up visit. She has previously been seen by Dr. Estes for management of stinging insect allergy.    Kaleigh presented to the allergy clinic in  after sustaining an anaphylactic reaction after being stung by what she believed to be a wasp. She was mowing the lawn at the time. Testing was notable for sensitization to hornets, wasps, and yellow jackets. Baseline tryptase was normal at 4.8. She began allergen immunotherapy treatment in 2018 and reached her maintenance dose in 2019. She has no history of systemic or significant local reactions to treatment. Kaleigh was stung last year and had localized itching. She took cetirizine and her symptoms resolved.      Past Medical History:   Diagnosis Date     Cervical high risk HPV (human papillomavirus) test positive 2019    see problem list     Elevated cholesterol     Low HDL - 41 in      Hypertension      Family History   Problem Relation Age of Onset     Heart Disease Father      Dementia Father      Diabetes Father      Neurologic Disorder Brother      Lipids Sister      Hyperlipidemia Sister      Lipids Sister      Myocardial Infarction Mother      Hypertension Mother      Hyperlipidemia Brother      Social History     Tobacco Use     Smoking status: Light Tobacco Smoker     Packs/day: 0.50     Years: 5.00     Pack years: 2.50     Types: Cigarettes, Cigarettes     Start date: 1985     Last attempt to quit: 2008     Years since quittin.4     Smokeless tobacco: Never Used   Vaping Use     Vaping Use: Never used   Substance Use Topics     Alcohol use: Yes     Comment: rare     Drug use: No     Social History     Social History Narrative     Not on file       Past medical, family, and social history were reviewed.    Review of Systems   Constitutional: Negative for  activity change, chills and fever.   HENT: Negative for congestion, dental problem, ear pain, facial swelling, nosebleeds, postnasal drip, rhinorrhea, sinus pressure and sneezing.    Eyes: Negative for discharge, redness and itching.   Respiratory: Negative for cough, chest tightness, shortness of breath and wheezing.    Cardiovascular: Negative for chest pain.   Gastrointestinal: Negative for diarrhea, nausea and vomiting.   Musculoskeletal: Negative for arthralgias, joint swelling and myalgias.   Skin: Negative for rash.   Neurological: Negative for headaches.   Hematological: Negative for adenopathy.   Psychiatric/Behavioral: Negative for behavioral problems and self-injury.         Current Outpatient Medications:      amLODIPine (NORVASC) 5 MG tablet, Take 1 tablet (5 mg) by mouth daily, Disp: 90 tablet, Rfl: 1     atorvastatin (LIPITOR) 10 MG tablet, Take 1 tablet (10 mg) by mouth daily, Disp: 90 tablet, Rfl: 3     ORDER FOR ALLERGEN IMMUNOTHERAPY, Mixed Vespid Venom 300 mcg/mL HS 12 ml Diluent: HSA qs to 5ml, Disp: 12 mL, Rfl: prn     ORDER FOR ALLERGEN IMMUNOTHERAPY, Wasp Venom 100 mcg/mL HS 12 ml Diluent: HSA qs to 5ml, Disp: 12 mL, Rfl: prn  Allergies   Allergen Reactions     Bees Hives and Swelling     Pyridoxine Hives     Other reaction(s): Angioedema       EXAM:   BP (!) 169/79 (BP Location: Right arm, Patient Position: Sitting, Cuff Size: Adult Regular)   Pulse 71   SpO2 99%   Physical Exam  Vitals and nursing note reviewed.   Constitutional:       Appearance: Normal appearance.   HENT:      Head: Normocephalic and atraumatic.      Right Ear: External ear normal.      Left Ear: External ear normal.      Nose: No nasal deformity.      Mouth/Throat:      Mouth: Mucous membranes are moist. No oral lesions.      Pharynx: Oropharynx is clear. Uvula midline. No posterior oropharyngeal erythema.   Eyes:      General: Lids are normal. No scleral icterus.     Extraocular Movements: Extraocular movements  intact.      Conjunctiva/sclera: Conjunctivae normal.   Neck:      Comments: Symmetric, no scars or lesions  Cardiovascular:      Rate and Rhythm: Normal rate and regular rhythm.      Heart sounds: Normal heart sounds, S1 normal and S2 normal.   Pulmonary:      Effort: Pulmonary effort is normal. No prolonged expiration or respiratory distress.      Breath sounds: Normal breath sounds and air entry.   Musculoskeletal:      Cervical back: Normal range of motion and neck supple.      Comments: No musculoskeletal defects noted   Skin:     Findings: No lesion or rash.   Neurological:      General: No focal deficit present.      Mental Status: She is alert.   Psychiatric:         Mood and Affect: Mood and affect normal.         Behavior: Behavior normal.         WORKUP:  None    ASSESSMENT/PLAN:  Kaleigh Tyson is a 61 year old female here for a follow-up visit.    1. 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 She has completed 3 years of venom immunotherapy treatment at her maintenance dose and is doing well. She has been stung during this time and has developed only localized itching without any other significant symptoms. We reviewed the risks and benefits of continuing venom immunotherapy treatment along with the recommended duration of treatment.     - continue venom immunotherapy per protocol - recommend completion of a minimum of 5 years of treatment at maintenance dose though lifelong therapy may be considered  - use epinephrine auto-injector as directed for severe allergic reactions      Follow-up in 1 year, sooner if needed      Thank you for allowing me to participate in the care of Kaleigh Tyson.      Mary Lou Garcia MD, FAAAAI  Allergy/Immunology  [facility]      Chart documentation done in part with Dragon Voice Recognition Software. Although  reviewed after completion, some word and grammatical errors may remain.

## 2022-06-10 ENCOUNTER — ALLIED HEALTH/NURSE VISIT (OUTPATIENT)
Dept: FAMILY MEDICINE | Facility: CLINIC | Age: 62
End: 2022-06-10
Payer: COMMERCIAL

## 2022-06-10 VITALS — HEART RATE: 66 BPM | DIASTOLIC BLOOD PRESSURE: 70 MMHG | SYSTOLIC BLOOD PRESSURE: 134 MMHG

## 2022-06-10 DIAGNOSIS — Z01.30 BLOOD PRESSURE CHECK: Primary | ICD-10-CM

## 2022-06-10 PROCEDURE — 99207 PR DROP WITH A PROCEDURE: CPT | Mod: 25 | Performed by: FAMILY MEDICINE

## 2022-06-10 NOTE — PROGRESS NOTES
Kaleigh Tyson was evaluated at [facility] on Maritza 10, 2022 at which time her blood pressure was:    BP Readings from Last 3 Encounters:   06/10/22 134/70   05/31/22 (!) 144/82   05/23/22 (!) 146/76     Pulse Readings from Last 3 Encounters:   06/10/22 66   05/31/22 71   05/23/22 64       Reviewed lifestyle modifications for blood pressure control and reduction: including making healthy food choices, managing weight, getting regular exercise, smoking cessation, reducing alcohol consumption, monitoring blood pressure regularly.     Symptoms: None    BP Goal:< 140/90 mmHg    BP Assessment:  BP at goal    Potential Reasons for BP too high: NA - Not applicable    BP Follow-Up Plan: Other: follow up at pharmacy in 3 months    Recommendation to Provider: n/a    Note completed by: Jessa Varela, Pharm D  [facility]  [phone]

## 2022-06-14 ENCOUNTER — ALLIED HEALTH/NURSE VISIT (OUTPATIENT)
Dept: ALLERGY | Facility: CLINIC | Age: 62
End: 2022-06-14
Payer: COMMERCIAL

## 2022-06-14 DIAGNOSIS — T63.441D TOXIC EFFECT OF VENOM OF BEES, UNINTENTIONAL, SUBSEQUENT ENCOUNTER: Primary | ICD-10-CM

## 2022-06-14 PROCEDURE — 95117 IMMUNOTHERAPY INJECTIONS: CPT

## 2022-06-14 NOTE — PROGRESS NOTES
Patient presented after waiting 30 minutes with no reaction to allergy injections. Discharged from clinic.    Sonia PEDERSEN RN 6/14/2022 11:05 AM

## 2022-06-28 ENCOUNTER — ALLIED HEALTH/NURSE VISIT (OUTPATIENT)
Dept: ALLERGY | Facility: CLINIC | Age: 62
End: 2022-06-28
Payer: COMMERCIAL

## 2022-06-28 DIAGNOSIS — T63.441D TOXIC EFFECT OF VENOM OF BEES, UNINTENTIONAL, SUBSEQUENT ENCOUNTER: Primary | ICD-10-CM

## 2022-06-28 PROCEDURE — 95117 IMMUNOTHERAPY INJECTIONS: CPT

## 2022-06-28 NOTE — PROGRESS NOTES
Patient presented after waiting 30 minutes with no reaction to allergy injections. Discharged from clinic.    Sonia PEDERSEN RN 6/28/2022 11:14 AM

## 2022-08-11 ENCOUNTER — TELEPHONE (OUTPATIENT)
Dept: ALLERGY | Facility: CLINIC | Age: 62
End: 2022-08-11

## 2022-08-11 NOTE — TELEPHONE ENCOUNTER
Pt calling clinic to up date provider that she was stung by a bee and went the ED for hand swelling. Sx present were hand swelling and /122 no additional symptoms. Per patient no Epi was given only Pepcid. She took Zyrtec after being stung. Can see ED note dating 7/8/22 in Epic.    Pt next allergy shot is scheduled on 8/23/22.      Laney Lozada MA

## 2022-08-23 ENCOUNTER — ALLIED HEALTH/NURSE VISIT (OUTPATIENT)
Dept: ALLERGY | Facility: CLINIC | Age: 62
End: 2022-08-23
Payer: COMMERCIAL

## 2022-08-23 DIAGNOSIS — T63.451A TOXIC EFFECT OF VENOM OF HORNETS, UNINTENTIONAL, INITIAL ENCOUNTER: ICD-10-CM

## 2022-08-23 DIAGNOSIS — T63.441D TOXIC EFFECT OF VENOM OF BEES, UNINTENTIONAL, SUBSEQUENT ENCOUNTER: ICD-10-CM

## 2022-08-23 DIAGNOSIS — T78.2XXA ANAPHYLAXIS DUE TO INSECT VENOM: Primary | ICD-10-CM

## 2022-08-23 DIAGNOSIS — T63.481A ANAPHYLAXIS DUE TO INSECT VENOM: Primary | ICD-10-CM

## 2022-08-23 DIAGNOSIS — T63.461A TOXIC EFFECT OF VENOM OF WASPS, UNINTENTIONAL, INITIAL ENCOUNTER: ICD-10-CM

## 2022-08-23 PROCEDURE — 95117 IMMUNOTHERAPY INJECTIONS: CPT

## 2022-08-23 NOTE — PROGRESS NOTES
Patient presented after waiting 30 minutes with no reaction to allergy injections. Discharged from clinic.    Sonia PEDERSEN RN 8/23/2022 11:40 AM

## 2022-10-18 ENCOUNTER — ALLIED HEALTH/NURSE VISIT (OUTPATIENT)
Dept: ALLERGY | Facility: CLINIC | Age: 62
End: 2022-10-18
Payer: COMMERCIAL

## 2022-10-18 DIAGNOSIS — T63.441D TOXIC EFFECT OF VENOM OF BEES, UNINTENTIONAL, SUBSEQUENT ENCOUNTER: Primary | ICD-10-CM

## 2022-10-18 PROCEDURE — 99207 PR DROP WITH A PROCEDURE: CPT

## 2022-10-18 PROCEDURE — 95117 IMMUNOTHERAPY INJECTIONS: CPT

## 2022-11-12 DIAGNOSIS — I10 HYPERTENSION GOAL BP (BLOOD PRESSURE) < 140/90: ICD-10-CM

## 2022-11-13 RX ORDER — AMLODIPINE BESYLATE 5 MG/1
5 TABLET ORAL DAILY
Qty: 90 TABLET | Refills: 1 | Status: SHIPPED | OUTPATIENT
Start: 2022-11-13 | End: 2023-05-24

## 2022-12-05 ENCOUNTER — ALLIED HEALTH/NURSE VISIT (OUTPATIENT)
Dept: ALLERGY | Facility: CLINIC | Age: 62
End: 2022-12-05
Payer: COMMERCIAL

## 2022-12-05 DIAGNOSIS — T63.441D TOXIC EFFECT OF VENOM OF BEES, UNINTENTIONAL, SUBSEQUENT ENCOUNTER: Primary | ICD-10-CM

## 2022-12-05 PROCEDURE — 95125 IMMUNOTHERAPY 2/> INJECTIONS: CPT

## 2022-12-05 NOTE — PROGRESS NOTES
Kaleigh Tyson presents to clinic today at the request of Mary Lou Garcia MD (ordering provider) for Venom immunotherapy injection(s).       This service provided today was under the care of Easton Mitchell MD; the supervising provider of the day; who was available if needed.      Patient presented after waiting 30 minutes with no reaction to  injections. Discharged from clinic.    Cynthia Leal RN

## 2023-01-16 ENCOUNTER — ALLIED HEALTH/NURSE VISIT (OUTPATIENT)
Dept: ALLERGY | Facility: CLINIC | Age: 63
End: 2023-01-16
Payer: COMMERCIAL

## 2023-01-16 DIAGNOSIS — T63.441D TOXIC EFFECT OF VENOM OF BEES, UNINTENTIONAL, SUBSEQUENT ENCOUNTER: Primary | ICD-10-CM

## 2023-01-16 PROCEDURE — 95117 IMMUNOTHERAPY INJECTIONS: CPT

## 2023-01-16 NOTE — PROGRESS NOTES
Kaleigh Tyson presents to clinic today at the request of Mary Lou Garcia MD (ordering provider) for Venom immunotherapy injection(s).       This service provided today was under the care of Mary Lou Garcia MD; the supervising provider of the day; who was available if needed.      Patient presented after waiting 30 minutes with no reaction to  injections. Discharged from clinic.    Cynthia Leal RN

## 2023-02-14 ENCOUNTER — ANCILLARY ORDERS (OUTPATIENT)
Dept: FAMILY MEDICINE | Facility: CLINIC | Age: 63
End: 2023-02-14

## 2023-02-14 DIAGNOSIS — Z12.31 VISIT FOR SCREENING MAMMOGRAM: ICD-10-CM

## 2023-02-21 ENCOUNTER — OFFICE VISIT (OUTPATIENT)
Dept: ALLERGY | Facility: CLINIC | Age: 63
End: 2023-02-21
Payer: COMMERCIAL

## 2023-02-21 VITALS — HEART RATE: 68 BPM | DIASTOLIC BLOOD PRESSURE: 78 MMHG | OXYGEN SATURATION: 98 % | SYSTOLIC BLOOD PRESSURE: 140 MMHG

## 2023-02-21 DIAGNOSIS — T63.481A ANAPHYLAXIS DUE TO INSECT VENOM: Primary | ICD-10-CM

## 2023-02-21 DIAGNOSIS — T78.2XXA ANAPHYLAXIS DUE TO INSECT VENOM: Primary | ICD-10-CM

## 2023-02-21 PROCEDURE — 99213 OFFICE O/P EST LOW 20 MIN: CPT | Performed by: ALLERGY & IMMUNOLOGY

## 2023-02-21 ASSESSMENT — ENCOUNTER SYMPTOMS
COUGH: 0
RHINORRHEA: 0
EYE ITCHING: 0
ADENOPATHY: 0
HEADACHES: 0
EYE REDNESS: 0
DIARRHEA: 0
SHORTNESS OF BREATH: 0
JOINT SWELLING: 0
SINUS PRESSURE: 0
CHEST TIGHTNESS: 0
NAUSEA: 0
EYE DISCHARGE: 0
CHILLS: 0
WHEEZING: 0
ARTHRALGIAS: 0
MYALGIAS: 0
VOMITING: 0
FACIAL SWELLING: 0
FEVER: 0
ACTIVITY CHANGE: 0

## 2023-02-21 NOTE — LETTER
2/21/2023         RE: Kaleigh Tyson  [address] [facility] [address]        Dear Colleague,    Thank you for referring your patient, Kaleigh Tyson, to the [facility]. Please see a copy of my visit note below.    Kaleigh Tyson was seen in the Allergy Clinic at [facility].      Kaleigh Tyson is a 62 year old Choose not to answer female who is seen today for a follow-up visit.    Stung on her finger in July. She kept pinching her hand trying to get the stinger out. Entire hand started swelling within about 30 minutes. No hives, itching, or other symptoms. EMTs were called and arrived at her home. Her BP was very high but she had no other symptoms and did not go to the ED. She did take cetirizine but no other medications. She has not had any other insect stings in the past year.    Started VIT in 8/2018 and reached maintenance dose in 1/2019.    Past Medical History:   Diagnosis Date     Cervical high risk HPV (human papillomavirus) test positive 03/21/2019    see problem list     Elevated cholesterol     Low HDL - 41 in 1/09     Hypertension      Family History   Problem Relation Age of Onset     Heart Disease Father      Dementia Father      Diabetes Father      Neurologic Disorder Brother      Lipids Sister      Hyperlipidemia Sister      Lipids Sister      Myocardial Infarction Mother      Hypertension Mother      Hyperlipidemia Brother      Social History     Tobacco Use     Smoking status: Light Smoker     Packs/day: 0.50     Years: 5.00     Pack years: 2.50     Types: Cigarettes     Start date: 8/2/1985     Last attempt to quit: 1/1/2008     Years since quitting: 15.1     Smokeless tobacco: Never   Vaping Use     Vaping Use: Never used   Substance Use Topics     Alcohol use: Yes     Comment: rare     Drug use: No     Social History     Social History Narrative     Not on file       Past medical, family, and social history were reviewed.    Review of  Systems   Constitutional: Negative for activity change, chills and fever.   HENT: Negative for congestion, dental problem, ear pain, facial swelling, nosebleeds, postnasal drip, rhinorrhea, sinus pressure and sneezing.    Eyes: Negative for discharge, redness and itching.   Respiratory: Negative for cough, chest tightness, shortness of breath and wheezing.    Cardiovascular: Negative for chest pain.   Gastrointestinal: Negative for diarrhea, nausea and vomiting.   Musculoskeletal: Negative for arthralgias, joint swelling and myalgias.   Skin: Negative for rash.   Neurological: Negative for headaches.   Hematological: Negative for adenopathy.   Psychiatric/Behavioral: Negative for behavioral problems and self-injury.         Current Outpatient Medications:      amLODIPine (NORVASC) 5 MG tablet, TAKE 1 TABLET (5 MG) BY MOUTH DAILY, Disp: 90 tablet, Rfl: 1     atorvastatin (LIPITOR) 10 MG tablet, Take 1 tablet (10 mg) by mouth daily, Disp: 90 tablet, Rfl: 3     ORDER FOR ALLERGEN IMMUNOTHERAPY, Mixed Vespid Venom 300 mcg/mL HS 12 ml Diluent: HSA qs to 5ml, Disp: 12 mL, Rfl: prn     ORDER FOR ALLERGEN IMMUNOTHERAPY, Wasp Venom 100 mcg/mL HS 12 ml Diluent: HSA qs to 5ml, Disp: 12 mL, Rfl: prn  Allergies   Allergen Reactions     Bees Hives and Swelling     Pyridoxine Hives     Other reaction(s): Angioedema       EXAM:   BP (!) 140/78 (BP Location: Right arm, Patient Position: Sitting, Cuff Size: Adult Regular)   Pulse 68   SpO2 98%   Physical Exam  Vitals and nursing note reviewed.   HENT:      Head: Normocephalic and atraumatic.      Right Ear: External ear normal.      Left Ear: External ear normal.      Nose: No rhinorrhea.   Skin:     General: Skin is warm and dry.      Findings: No rash.   Neurological:      General: No focal deficit present.      Mental Status: She is alert.   Psychiatric:         Mood and Affect: Mood and affect normal.         WORKUP:  None    ASSESSMENT/PLAN:  Kaleigh Tyson is a 62 year old  female here for a follow-up visit.     1. 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 She had no other associated systemic symptoms. Started venom immunotherapy in 2018 and has been doing well. Reviewed various types of reactions including local, large local, and anaphylaxis. Advised that her most recent reaction is not concerning for anaphylaxis given that the swelling remained localized and she has no other associated symptoms. She expressed feeling anxious about the risk of another anaphylactic reaction. Discussed that venom immunotherapy can be stopped after 5 years though lifelong therapy can be continued if preferred. She has completed 4 years of treatment at the maintenance dose and plans to continue for now    - continue venom immunotherapy per protocol  - use epinephrine auto-injector as directed for severe allergic reactions      Follow-up in 1 year, sooner if needed      Thank you for allowing me to participate in the care of Kaleigh Tyson.      Mary Lou Garcia MD, FAAAAI  Allergy/Immunology  [facility]      Chart documentation done in part with Dragon Voice Recognition Software. Although reviewed after completion, some word and grammatical errors may remain.      Again, thank you for allowing me to participate in the care of your patient.        Sincerely,        Mary Lou Garcia MD

## 2023-02-21 NOTE — PROGRESS NOTES
Kaleigh Tyson was seen in the Allergy Clinic at [facility].      Kaleigh Tyson is a 62 year old Choose not to answer female who is seen today for a follow-up visit.    Stung on her finger in July. She kept pinching her hand trying to get the stinger out. Entire hand started swelling within about 30 minutes. No hives, itching, or other symptoms. EMTs were called and arrived at her home. Her BP was very high but she had no other symptoms and did not go to the ED. She did take cetirizine but no other medications. She has not had any other insect stings in the past year.    Started VIT in 8/2018 and reached maintenance dose in 1/2019.    Past Medical History:   Diagnosis Date     Cervical high risk HPV (human papillomavirus) test positive 03/21/2019    see problem list     Elevated cholesterol     Low HDL - 41 in 1/09     Hypertension      Family History   Problem Relation Age of Onset     Heart Disease Father      Dementia Father      Diabetes Father      Neurologic Disorder Brother      Lipids Sister      Hyperlipidemia Sister      Lipids Sister      Myocardial Infarction Mother      Hypertension Mother      Hyperlipidemia Brother      Social History     Tobacco Use     Smoking status: Light Smoker     Packs/day: 0.50     Years: 5.00     Pack years: 2.50     Types: Cigarettes     Start date: 8/2/1985     Last attempt to quit: 1/1/2008     Years since quitting: 15.1     Smokeless tobacco: Never   Vaping Use     Vaping Use: Never used   Substance Use Topics     Alcohol use: Yes     Comment: rare     Drug use: No     Social History     Social History Narrative     Not on file       Past medical, family, and social history were reviewed.    Review of Systems   Constitutional: Negative for activity change, chills and fever.   HENT: Negative for congestion, dental problem, ear pain, facial swelling, nosebleeds, postnasal drip, rhinorrhea, sinus pressure and sneezing.    Eyes: Negative for discharge,  redness and itching.   Respiratory: Negative for cough, chest tightness, shortness of breath and wheezing.    Cardiovascular: Negative for chest pain.   Gastrointestinal: Negative for diarrhea, nausea and vomiting.   Musculoskeletal: Negative for arthralgias, joint swelling and myalgias.   Skin: Negative for rash.   Neurological: Negative for headaches.   Hematological: Negative for adenopathy.   Psychiatric/Behavioral: Negative for behavioral problems and self-injury.         Current Outpatient Medications:      amLODIPine (NORVASC) 5 MG tablet, TAKE 1 TABLET (5 MG) BY MOUTH DAILY, Disp: 90 tablet, Rfl: 1     atorvastatin (LIPITOR) 10 MG tablet, Take 1 tablet (10 mg) by mouth daily, Disp: 90 tablet, Rfl: 3     ORDER FOR ALLERGEN IMMUNOTHERAPY, Mixed Vespid Venom 300 mcg/mL HS 12 ml Diluent: HSA qs to 5ml, Disp: 12 mL, Rfl: prn     ORDER FOR ALLERGEN IMMUNOTHERAPY, Wasp Venom 100 mcg/mL HS 12 ml Diluent: HSA qs to 5ml, Disp: 12 mL, Rfl: prn  Allergies   Allergen Reactions     Bees Hives and Swelling     Pyridoxine Hives     Other reaction(s): Angioedema       EXAM:   BP (!) 140/78 (BP Location: Right arm, Patient Position: Sitting, Cuff Size: Adult Regular)   Pulse 68   SpO2 98%   Physical Exam  Vitals and nursing note reviewed.   HENT:      Head: Normocephalic and atraumatic.      Right Ear: External ear normal.      Left Ear: External ear normal.      Nose: No rhinorrhea.   Skin:     General: Skin is warm and dry.      Findings: No rash.   Neurological:      General: No focal deficit present.      Mental Status: She is alert.   Psychiatric:         Mood and Affect: Mood and affect normal.         WORKUP:  None    ASSESSMENT/PLAN:  Kaleigh Tyson is a 62 year old female here for a follow-up visit.     1. 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 She had no other associated systemic symptoms. Started venom immunotherapy in 2018 and has been doing  well. Reviewed various types of reactions including local, large local, and anaphylaxis. Advised that her most recent reaction is not concerning for anaphylaxis given that the swelling remained localized and she has no other associated symptoms. She expressed feeling anxious about the risk of another anaphylactic reaction. Discussed that venom immunotherapy can be stopped after 5 years though lifelong therapy can be continued if preferred. She has completed 4 years of treatment at the maintenance dose and plans to continue for now    - continue venom immunotherapy per protocol  - use epinephrine auto-injector as directed for severe allergic reactions      Follow-up in 1 year, sooner if needed      Thank you for allowing me to participate in the care of Kaleigh BABIN Derrekkhalif.      Mary Lou Garcia MD, FAAAAI  Allergy/Immunology  [facility]      Chart documentation done in part with Dragon Voice Recognition Software. Although reviewed after completion, some word and grammatical errors may remain.

## 2023-03-13 ENCOUNTER — TELEPHONE (OUTPATIENT)
Dept: ALLERGY | Facility: CLINIC | Age: 63
End: 2023-03-13

## 2023-03-13 NOTE — TELEPHONE ENCOUNTER
Called and spoke with pt, told pt that pt could come back in 10 days but needs to be perfectly healthy in order to receive injections. Scheduled pt 2 appts to get caught back up, and answered her questions accordingly. Pt confirmed understanding. Will close.    Madelyn WALTERS MA

## 2023-03-13 NOTE — TELEPHONE ENCOUNTER
General Call      Reason for Call:  Call back    What are your questions or concerns:  Patient had to cancel her allergy shot today because she has covid and has questions on when she should reschedule her next one    Date of last appointment with provider: n/a    Could we send this information to you in [employer] or would you prefer to receive a phone call?:   Patient would prefer a phone call   Okay to leave a detailed message?: Yes at Cell number on file:    Telephone Information:   Mobile [phone]

## 2023-03-23 ENCOUNTER — ALLIED HEALTH/NURSE VISIT (OUTPATIENT)
Dept: ALLERGY | Facility: CLINIC | Age: 63
End: 2023-03-23
Payer: COMMERCIAL

## 2023-03-23 DIAGNOSIS — T63.481D ANAPHYLAXIS DUE TO HYMENOPTERA VENOM, ACCIDENTAL OR UNINTENTIONAL, SUBSEQUENT ENCOUNTER: ICD-10-CM

## 2023-03-23 DIAGNOSIS — T78.2XXD ANAPHYLACTIC REACTION TO BEE STING, ACCIDENTAL OR UNINTENTIONAL, SUBSEQUENT ENCOUNTER: ICD-10-CM

## 2023-03-23 DIAGNOSIS — T78.2XXD ANAPHYLAXIS DUE TO HYMENOPTERA VENOM, ACCIDENTAL OR UNINTENTIONAL, SUBSEQUENT ENCOUNTER: ICD-10-CM

## 2023-03-23 DIAGNOSIS — T63.441D TOXIC EFFECT OF VENOM OF BEES, UNINTENTIONAL, SUBSEQUENT ENCOUNTER: Primary | ICD-10-CM

## 2023-03-23 DIAGNOSIS — T63.441D ANAPHYLACTIC REACTION TO BEE STING, ACCIDENTAL OR UNINTENTIONAL, SUBSEQUENT ENCOUNTER: ICD-10-CM

## 2023-03-23 PROCEDURE — 95117 IMMUNOTHERAPY INJECTIONS: CPT

## 2023-03-23 RX ORDER — EPINEPHRINE 0.3 MG/.3ML
0.3 INJECTION SUBCUTANEOUS ONCE
Qty: 0.6 ML | Refills: 1 | Status: SHIPPED | OUTPATIENT
Start: 2023-03-23 | End: 2023-03-23

## 2023-03-23 NOTE — PROGRESS NOTES
Kaleigh Tyson presents to clinic today at the request of Brien Garcia MD (ordering provider) for Venom immunotherapy injection(s).       This service provided today was under the care of Brien Garcia MD; the supervising provider of the day; who was available if needed.      Patient presented after waiting 30 minutes with no reaction to  injections. Discharged from clinic.    Signed Prescriptions:                        Disp   Refills    EPINEPHrine (ANY BX GENERIC EQUIV) 0.3 MG/*0.6 mL 1        Sig: Inject 0.3 mLs (0.3 mg) into the muscle once for 1           dose  Authorizing Provider: BRIEN GARCIA    RN refilled medication per [facility].     Cynthia Leal RN

## 2023-04-22 ENCOUNTER — ANCILLARY PROCEDURE (OUTPATIENT)
Dept: MAMMOGRAPHY | Facility: CLINIC | Age: 63
End: 2023-04-22
Attending: FAMILY MEDICINE
Payer: COMMERCIAL

## 2023-04-22 DIAGNOSIS — Z12.31 VISIT FOR SCREENING MAMMOGRAM: ICD-10-CM

## 2023-04-22 PROCEDURE — 77067 SCR MAMMO BI INCL CAD: CPT | Mod: TC | Performed by: RADIOLOGY

## 2023-04-23 ENCOUNTER — HEALTH MAINTENANCE LETTER (OUTPATIENT)
Age: 63
End: 2023-04-23

## 2023-05-09 DIAGNOSIS — T78.2XXD ANAPHYLAXIS DUE TO HYMENOPTERA VENOM, ACCIDENTAL OR UNINTENTIONAL, SUBSEQUENT ENCOUNTER: Primary | ICD-10-CM

## 2023-05-09 DIAGNOSIS — T63.481D ANAPHYLAXIS DUE TO HYMENOPTERA VENOM, ACCIDENTAL OR UNINTENTIONAL, SUBSEQUENT ENCOUNTER: Primary | ICD-10-CM

## 2023-05-09 PROCEDURE — 95147 ANTIGEN THERAPY SERVICES: CPT | Performed by: ALLERGY & IMMUNOLOGY

## 2023-05-09 NOTE — TELEPHONE ENCOUNTER
Allergy serums received at [facility].    Vials received below:    Vial Color Content                      Vial Size Expiration Date  Red 1:1 Wasp 13mL  05/05/2024  Red 1:1 Mixed Vespid 13mL  05/04/2024    Signature  Sonia PEDERSEN RN

## 2023-05-09 NOTE — PROGRESS NOTES
Allergy serums billed to [facility].     Vials billed below:    Vial Color Content                      Vial Size Expiration Date  Red 1:1 Mixed Vespid 13mL  5/4/2024    Billed 10 units    Checked by Damari PHILIPPE RN      Signature  Shauna Colon RN

## 2023-05-11 ENCOUNTER — ALLIED HEALTH/NURSE VISIT (OUTPATIENT)
Dept: ALLERGY | Facility: CLINIC | Age: 63
End: 2023-05-11
Payer: COMMERCIAL

## 2023-05-11 DIAGNOSIS — T63.441D TOXIC EFFECT OF VENOM OF BEES, UNINTENTIONAL, SUBSEQUENT ENCOUNTER: Primary | ICD-10-CM

## 2023-05-11 PROCEDURE — 95117 IMMUNOTHERAPY INJECTIONS: CPT

## 2023-05-11 NOTE — PROGRESS NOTES
Aide Tyson presents to clinic today at the request of Mary Lou Garcia MD (ordering provider) for Venom immunotherapy injection(s).       This service provided today was under the care of Mary Lou Garcia MD; the supervising provider of the day; who was available if needed.      Patient presented after waiting 30 minutes with no reaction to  injections. Discharged from clinic.    Cynthia Leal RN

## 2023-05-16 ENCOUNTER — ALLIED HEALTH/NURSE VISIT (OUTPATIENT)
Dept: ALLERGY | Facility: CLINIC | Age: 63
End: 2023-05-16
Payer: COMMERCIAL

## 2023-05-16 DIAGNOSIS — T63.441D TOXIC EFFECT OF VENOM OF BEES, UNINTENTIONAL, SUBSEQUENT ENCOUNTER: Primary | ICD-10-CM

## 2023-05-16 PROCEDURE — 95117 IMMUNOTHERAPY INJECTIONS: CPT

## 2023-05-16 NOTE — PROGRESS NOTES
Aide Tyson presents to clinic today at the request of Mary Lou Garcia MD (ordering provider) for Venom immunotherapy injection(s).       This service provided today was under the care of Mary Lou Garcia MD; the supervising provider of the day; who was available if needed.      Patient presented after waiting 30 minutes with no reaction to  injections. Discharged from clinic.    Sonia PEDERSEN RN, BSN

## 2023-05-21 ASSESSMENT — ENCOUNTER SYMPTOMS
COUGH: 0
FEVER: 0
ABDOMINAL PAIN: 0
FREQUENCY: 0
ARTHRALGIAS: 0
HEMATURIA: 0
DIZZINESS: 0
CHILLS: 0
PALPITATIONS: 0
SHORTNESS OF BREATH: 0
PARESTHESIAS: 0
BREAST MASS: 0
SORE THROAT: 0
NAUSEA: 0
CONSTIPATION: 0
NERVOUS/ANXIOUS: 0
WEAKNESS: 0
MYALGIAS: 0
DIARRHEA: 0
EYE PAIN: 0
HEARTBURN: 0
HEMATOCHEZIA: 0
HEADACHES: 0
DYSURIA: 0
JOINT SWELLING: 0

## 2023-05-23 ENCOUNTER — ALLIED HEALTH/NURSE VISIT (OUTPATIENT)
Dept: ALLERGY | Facility: CLINIC | Age: 63
End: 2023-05-23
Payer: COMMERCIAL

## 2023-05-23 DIAGNOSIS — T63.441D TOXIC EFFECT OF VENOM OF BEES, UNINTENTIONAL, SUBSEQUENT ENCOUNTER: Primary | ICD-10-CM

## 2023-05-23 PROCEDURE — 95117 IMMUNOTHERAPY INJECTIONS: CPT

## 2023-05-24 ENCOUNTER — OFFICE VISIT (OUTPATIENT)
Dept: FAMILY MEDICINE | Facility: CLINIC | Age: 63
End: 2023-05-24
Payer: COMMERCIAL

## 2023-05-24 ENCOUNTER — MYC MEDICAL ADVICE (OUTPATIENT)
Dept: FAMILY MEDICINE | Facility: CLINIC | Age: 63
End: 2023-05-24

## 2023-05-24 VITALS
DIASTOLIC BLOOD PRESSURE: 78 MMHG | WEIGHT: 154.8 LBS | SYSTOLIC BLOOD PRESSURE: 152 MMHG | HEIGHT: 64 IN | HEART RATE: 67 BPM | BODY MASS INDEX: 26.43 KG/M2 | RESPIRATION RATE: 16 BRPM | OXYGEN SATURATION: 99 % | TEMPERATURE: 97.4 F

## 2023-05-24 DIAGNOSIS — E78.5 HYPERLIPIDEMIA LDL GOAL <130: ICD-10-CM

## 2023-05-24 DIAGNOSIS — Z00.00 ROUTINE GENERAL MEDICAL EXAMINATION AT A HEALTH CARE FACILITY: Primary | ICD-10-CM

## 2023-05-24 DIAGNOSIS — R87.810 CERVICAL HIGH RISK HPV (HUMAN PAPILLOMAVIRUS) TEST POSITIVE: ICD-10-CM

## 2023-05-24 DIAGNOSIS — I10 HYPERTENSION GOAL BP (BLOOD PRESSURE) < 140/90: ICD-10-CM

## 2023-05-24 LAB
ANION GAP SERPL CALCULATED.3IONS-SCNC: 4 MMOL/L (ref 3–14)
BUN SERPL-MCNC: 12 MG/DL (ref 7–30)
CALCIUM SERPL-MCNC: 9.6 MG/DL (ref 8.5–10.1)
CHLORIDE BLD-SCNC: 106 MMOL/L (ref 94–109)
CHOLEST SERPL-MCNC: 180 MG/DL
CO2 SERPL-SCNC: 30 MMOL/L (ref 20–32)
CREAT SERPL-MCNC: 0.76 MG/DL (ref 0.52–1.04)
FASTING STATUS PATIENT QL REPORTED: YES
GFR SERPL CREATININE-BSD FRML MDRD: 89 ML/MIN/1.73M2
GLUCOSE BLD-MCNC: 104 MG/DL (ref 70–99)
HDLC SERPL-MCNC: 48 MG/DL
LDLC SERPL CALC-MCNC: 97 MG/DL
NONHDLC SERPL-MCNC: 132 MG/DL
POTASSIUM BLD-SCNC: 4.1 MMOL/L (ref 3.4–5.3)
SODIUM SERPL-SCNC: 140 MMOL/L (ref 133–144)
TRIGL SERPL-MCNC: 175 MG/DL

## 2023-05-24 PROCEDURE — 80061 LIPID PANEL: CPT | Performed by: FAMILY MEDICINE

## 2023-05-24 PROCEDURE — 36415 COLL VENOUS BLD VENIPUNCTURE: CPT | Performed by: FAMILY MEDICINE

## 2023-05-24 PROCEDURE — 99396 PREV VISIT EST AGE 40-64: CPT | Performed by: FAMILY MEDICINE

## 2023-05-24 PROCEDURE — G0145 SCR C/V CYTO,THINLAYER,RESCR: HCPCS | Performed by: FAMILY MEDICINE

## 2023-05-24 PROCEDURE — 99214 OFFICE O/P EST MOD 30 MIN: CPT | Mod: 25 | Performed by: FAMILY MEDICINE

## 2023-05-24 PROCEDURE — 87624 HPV HI-RISK TYP POOLED RSLT: CPT | Performed by: FAMILY MEDICINE

## 2023-05-24 PROCEDURE — 80048 BASIC METABOLIC PNL TOTAL CA: CPT | Performed by: FAMILY MEDICINE

## 2023-05-24 RX ORDER — AMLODIPINE BESYLATE 5 MG/1
5 TABLET ORAL DAILY
Qty: 90 TABLET | Refills: 1 | Status: SHIPPED | OUTPATIENT
Start: 2023-05-24 | End: 2023-11-20

## 2023-05-24 RX ORDER — ATORVASTATIN CALCIUM 10 MG/1
10 TABLET, FILM COATED ORAL DAILY
Qty: 90 TABLET | Refills: 3 | Status: SHIPPED | OUTPATIENT
Start: 2023-05-24 | End: 2024-05-11

## 2023-05-24 ASSESSMENT — ENCOUNTER SYMPTOMS
CHILLS: 0
HEMATURIA: 0
JOINT SWELLING: 0
EYE PAIN: 0
DIZZINESS: 0
WEAKNESS: 0
HEADACHES: 0
SORE THROAT: 0
DYSURIA: 0
COUGH: 0
FREQUENCY: 0
CONSTIPATION: 0
ARTHRALGIAS: 0
HEMATOCHEZIA: 0
SHORTNESS OF BREATH: 0
HEARTBURN: 0
FEVER: 0
NAUSEA: 0
BREAST MASS: 0
NERVOUS/ANXIOUS: 0
PALPITATIONS: 0
ABDOMINAL PAIN: 0
MYALGIAS: 0
PARESTHESIAS: 0
DIARRHEA: 0

## 2023-05-24 ASSESSMENT — PAIN SCALES - GENERAL: PAINLEVEL: NO PAIN (0)

## 2023-05-24 NOTE — PATIENT INSTRUCTIONS

## 2023-05-24 NOTE — PROGRESS NOTES
SUBJECTIVE:   CC: Kaleigh is an 62 year old who presents for preventive health visit.       5/24/2023     7:29 AM   Additional Questions   Roomed by Mik     Patient has been advised of split billing requirements and indicates understanding: Yes  Healthy Habits:     Getting at least 3 servings of Calcium per day:  Yes    Bi-annual eye exam:  NO    Dental care twice a year:  Yes    Sleep apnea or symptoms of sleep apnea:  None    Diet:  Regular (no restrictions)    Frequency of exercise:  4-5 days/week    Duration of exercise:  Other    Taking medications regularly:  Yes    Medication side effects:  None    PHQ-2 Total Score: 0    Additional concerns today:  No                  Today's PHQ-2 Score:       5/24/2023     7:22 AM   PHQ-2 ( 1999 [employer])   Q1: Little interest or pleasure in doing things 0   Q2: Feeling down, depressed or hopeless 0   PHQ-2 Score 0   Q1: Little interest or pleasure in doing things Not at all   Q2: Feeling down, depressed or hopeless Not at all   PHQ-2 Score 0           Social History     Tobacco Use     Smoking status: Light Smoker     Packs/day: 0.50     Years: 5.00     Pack years: 2.50     Types: Cigarettes     Start date: 8/2/1985     Last attempt to quit: 1/1/2008     Years since quitting: 15.4     Smokeless tobacco: Never   Vaping Use     Vaping status: Never Used   Substance Use Topics     Alcohol use: Yes     Comment: rare             5/21/2023    12:22 PM   Alcohol Use   Prescreen: >3 drinks/day or >7 drinks/week? No     Reviewed orders with patient.  Reviewed health maintenance and updated orders accordingly - Yes    G 1 P 1   No LMP recorded. Patient is postmenopausal.     Fasting: Yes    Td: tdap 8/2016       Flu: 110/2022      Covid: Mod boost#1 11/13/2021      Shingrix: done      PPV: NA      Last 3 Pap and HPV Results:       Latest Ref Rng & Units 3/25/2020     8:55 AM 3/25/2020     8:48 AM 3/21/2019    10:55 AM   PAP / HPV   PAP (Historical)   NIL   NIL     HPV 16 DNA  NEG^Negative Negative    Negative     HPV 18 DNA NEG^Negative Negative    Negative     Other HR HPV NEG^Negative Negative    Positive                    Cholesterol:   Lab Results   Component Value Date    CHOL 160 2022    CHOL 214 2021     Lab Results   Component Value Date    HDL 50 2022    HDL 56 2021     Lab Results   Component Value Date    LDL 84 2022     2021     Lab Results   Component Value Date    TRIG 132 2022    TRIG 155 2021     Lab Results   Component Value Date    CHOLHDLRATIO 6.1 2009         MM2023  Dexa:  NA     Flex/colo: 10/2020 q3y CG      Seat Belt: Yes    Sunscreen use: Yes   Calcium Intake: adeq  Health Care Directive: No  Sexually Active: Yes     Current contraception: none  History of abnormal Pap smear: Yes: see prob list  Family history of colon/breast/ovarian cancer: No  Regular self breast exam: Yes  History of abnormal mammogram: No      Labs reviewed in EPIC  BP Readings from Last 3 Encounters:   23 (!) 152/78   23 (!) 140/78   06/10/22 134/70    Wt Readings from Last 3 Encounters:   23 70.2 kg (154 lb 12.8 oz)   22 70.9 kg (156 lb 6.4 oz)   10/20/21 71.5 kg (157 lb 9.6 oz)                  Patient Active Problem List   Diagnosis     CARDIOVASCULAR SCREENING; LDL GOAL LESS THAN 160     Anaphylaxis, subsequent encounter     Cervical high risk HPV (human papillomavirus) test positive     Hypertension goal BP (blood pressure) < 140/90     Prediabetes     Hyperlipidemia LDL goal <130     Past Surgical History:   Procedure Laterality Date     NO HISTORY OF SURGERY         Social History     Tobacco Use     Smoking status: Light Smoker     Packs/day: 0.50     Years: 5.00     Pack years: 2.50     Types: Cigarettes     Start date: 1985     Last attempt to quit: 2008     Years since quitting: 15.4     Smokeless tobacco: Never   Vaping Use     Vaping status: Never Used   Substance Use Topics      Alcohol use: Yes     Comment: rare     Family History   Problem Relation Age of Onset     Heart Disease Father      Dementia Father      Diabetes Father         At 80 yrs old he got diabetes     Neurologic Disorder Brother      Lipids Sister      Hyperlipidemia Sister      Lipids Sister      Myocardial Infarction Mother      Hypertension Mother      Hyperlipidemia Brother          Current Outpatient Medications   Medication Sig Dispense Refill     amLODIPine (NORVASC) 5 MG tablet Take 1 tablet (5 mg) by mouth daily 90 tablet 1     atorvastatin (LIPITOR) 10 MG tablet Take 1 tablet (10 mg) by mouth daily 90 tablet 3     ORDER FOR ALLERGEN IMMUNOTHERAPY Mixed Vespid Venom 300 mcg/mL HS 12 ml  Diluent: HSA qs to 5ml 12 mL prn     ORDER FOR ALLERGEN IMMUNOTHERAPY Wasp Venom 100 mcg/mL HS 12 ml  Diluent: HSA qs to 5ml 12 mL prn       Breast Cancer Screenin/8/2021    11:49 AM   Breast CA Risk Assessment (FHS-7)   Do you have a family history of breast, colon, or ovarian cancer? No / Unknown         Mammogram Screening: Recommended mammography every 1-2 years with patient discussion and risk factor consideration  Pertinent mammograms are reviewed under the imaging tab.      Reviewed and updated as needed this visit by clinical staff   Tobacco  Allergies  Meds  Problems  Med Hx  Surg Hx  Fam Hx          Reviewed and updated as needed this visit by Provider   Tobacco  Allergies  Meds  Problems  Med Hx  Surg Hx  Fam Hx             Review of Systems   Constitutional: Negative for chills and fever.   HENT: Negative for congestion, ear pain, hearing loss and sore throat.    Eyes: Negative for pain and visual disturbance.   Respiratory: Negative for cough and shortness of breath.    Cardiovascular: Negative for chest pain, palpitations and peripheral edema.   Gastrointestinal: Negative for abdominal pain, constipation, diarrhea, heartburn, hematochezia and nausea.   Breasts:  Negative for tenderness,  "breast mass and discharge.   Genitourinary: Negative for dysuria, frequency, genital sores, hematuria, pelvic pain, urgency, vaginal bleeding and vaginal discharge.   Musculoskeletal: Negative for arthralgias, joint swelling and myalgias.   Skin: Negative for rash.   Neurological: Negative for dizziness, weakness, headaches and paresthesias.   Psychiatric/Behavioral: Negative for mood changes. The patient is not nervous/anxious.           OBJECTIVE:   BP (!) 152/78   Pulse 67   Temp 97.4  F (36.3  C) (Oral)   Resp 16   Ht 1.626 m (5' 4\")   Wt 70.2 kg (154 lb 12.8 oz)   SpO2 99%   BMI 26.57 kg/m    Physical Exam  GENERAL APPEARANCE: healthy, alert and no distress  EYES: Eyes grossly normal to inspection, PERRL and conjunctivae and sclerae normal  HENT: ear canals and TM's normal, nose and mouth without ulcers or lesions, oropharynx clear and oral mucous membranes moist  NECK: no adenopathy, no asymmetry, masses, or scars and thyroid normal to palpation  RESP: lungs clear to auscultation - no rales, rhonchi or wheezes  BREAST: normal without masses, tenderness or nipple discharge and no palpable axillary masses or adenopathy  CV: regular rate and rhythm, normal S1 S2, no S3 or S4, no murmur, click or rub, no peripheral edema and peripheral pulses strong  ABDOMEN: soft, nontender, no hepatosplenomegaly, no masses and bowel sounds normal  MS: no musculoskeletal defects are noted and gait is age appropriate without ataxia  SKIN: no suspicious lesions or rashes  NEURO: Normal strength and tone, sensory exam grossly normal, mentation intact and speech normal  PSYCH: mentation appears normal and affect normal/bright    Diagnostic Test Results:  Labs reviewed in Epic    ASSESSMENT/PLAN:   (Z00.00) Routine general medical examination at a health care facility  (primary encounter diagnosis)  Comment: preventive needs reviewed   Plan: Pap Screen with HPV - recommended age 30 - 65         years        see orders in Epic. " "    (I10) Hypertension goal BP (blood pressure) < 140/90  Comment: not to goal here  Plan: BASIC METABOLIC PANEL, amLODIPine (NORVASC) 5         MG tablet   Refill x 6 months         Often with white coat htn, has validated cuff at home, will send in a few readings from home next week via [employer]     (R87.224) Cervical high risk HPV (human papillomavirus) test positive  Comment: due  Plan: Pap Screen with HPV - recommended age 30 - 65         years            (E78.5) Hyperlipidemia LDL goal <130  Comment: to goal  Plan: Lipid panel reflex to direct LDL Non-fasting,         atorvastatin (LIPITOR) 10 MG tablet        Refill x 1 yr             COUNSELING:  Reviewed preventive health counseling, as reflected in patient instructions  Special attention given to:        Regular exercise       Healthy diet/nutrition      BMI:   Estimated body mass index is 26.57 kg/m  as calculated from the following:    Height as of this encounter: 1.626 m (5' 4\").    Weight as of this encounter: 70.2 kg (154 lb 12.8 oz).   Weight management plan: Discussed healthy diet and exercise guidelines      She reports that she has been smoking cigarettes. She started smoking about 37 years ago. She has a 2.50 pack-year smoking history. She has never used smokeless tobacco.  Nicotine/Tobacco Cessation Plan:   Information offered: Patient not interested at this time          Lauren Saez MD  [facility]  "

## 2023-05-25 ENCOUNTER — ALLIED HEALTH/NURSE VISIT (OUTPATIENT)
Dept: FAMILY MEDICINE | Facility: CLINIC | Age: 63
End: 2023-05-25

## 2023-05-25 VITALS — DIASTOLIC BLOOD PRESSURE: 78 MMHG | SYSTOLIC BLOOD PRESSURE: 128 MMHG | HEART RATE: 78 BPM

## 2023-05-25 DIAGNOSIS — Z01.30 BLOOD PRESSURE CHECK: Primary | ICD-10-CM

## 2023-05-25 PROCEDURE — 99207 PR NO CHARGE NURSE ONLY: CPT | Performed by: FAMILY MEDICINE

## 2023-05-25 NOTE — PROGRESS NOTES
Aide Tyson was evaluated at [facility] on May 25, 2023 at which time her blood pressure was:    BP Readings from Last 3 Encounters:   05/25/23 128/78   05/24/23 (!) 152/78   02/21/23 (!) 140/78     Pulse Readings from Last 3 Encounters:   05/25/23 78   05/24/23 67   02/21/23 68       Reviewed lifestyle modifications for blood pressure control and reduction: including making healthy food choices, managing weight, getting regular exercise, smoking cessation, reducing alcohol consumption, monitoring blood pressure regularly.     Symptoms: None    BP Goal:< 140/90 mmHg    BP Assessment:  BP at goal.  Patient brought in home device the reading is 124/76 pulse 75    Potential Reasons for BP too high: NA - Not applicable    BP Follow-Up Plan: Recheck BP in 6 months at pharmacy    Recommendation to Provider: n/a    Note completed by: Jessa Varela, Pharm D  [facility]  [phone]

## 2023-05-28 NOTE — PROGRESS NOTES
Patient presented after waiting 30 minutes with no reaction to allergy injections. Discharged from clinic.    Tammi Do RN    
pmd

## 2023-05-30 LAB
BKR LAB AP GYN ADEQUACY: NORMAL
BKR LAB AP GYN INTERPRETATION: NORMAL
BKR LAB AP HPV REFLEX: NORMAL
BKR LAB AP PREVIOUS ABNL DX: NORMAL
BKR LAB AP PREVIOUS ABNORMAL: NORMAL
PATH REPORT.COMMENTS IMP SPEC: NORMAL
PATH REPORT.COMMENTS IMP SPEC: NORMAL
PATH REPORT.RELEVANT HX SPEC: NORMAL

## 2023-06-02 LAB
HUMAN PAPILLOMA VIRUS 16 DNA: NEGATIVE
HUMAN PAPILLOMA VIRUS 18 DNA: NEGATIVE
HUMAN PAPILLOMA VIRUS FINAL DIAGNOSIS: NORMAL
HUMAN PAPILLOMA VIRUS OTHER HR: NEGATIVE

## 2023-07-11 ENCOUNTER — ALLIED HEALTH/NURSE VISIT (OUTPATIENT)
Dept: ALLERGY | Facility: CLINIC | Age: 63
End: 2023-07-11
Payer: COMMERCIAL

## 2023-07-11 DIAGNOSIS — T63.441D TOXIC EFFECT OF VENOM OF BEES, UNINTENTIONAL, SUBSEQUENT ENCOUNTER: Primary | ICD-10-CM

## 2023-07-11 PROCEDURE — 95117 IMMUNOTHERAPY INJECTIONS: CPT

## 2023-08-21 ENCOUNTER — ALLIED HEALTH/NURSE VISIT (OUTPATIENT)
Dept: ALLERGY | Facility: CLINIC | Age: 63
End: 2023-08-21
Payer: COMMERCIAL

## 2023-08-21 DIAGNOSIS — T63.441D TOXIC EFFECT OF VENOM OF BEES, UNINTENTIONAL, SUBSEQUENT ENCOUNTER: Primary | ICD-10-CM

## 2023-08-21 PROCEDURE — 95117 IMMUNOTHERAPY INJECTIONS: CPT

## 2023-08-21 NOTE — PROGRESS NOTES
Aide Tyson presents to clinic today at the request of Mary Lou Garcia MD (ordering provider) for Venom immunotherapy injection(s).       This service provided today was under the care of Mary Lou Garcia MD; the supervising provider of the day; who was available if needed.      Patient presented after waiting 30 minutes with no reaction to  injections. Discharged from clinic.    Sonia PEDERSEN RN, BSN, PHN

## 2023-10-09 ENCOUNTER — ALLIED HEALTH/NURSE VISIT (OUTPATIENT)
Dept: ALLERGY | Facility: CLINIC | Age: 63
End: 2023-10-09
Payer: COMMERCIAL

## 2023-10-09 DIAGNOSIS — T63.441D TOXIC EFFECT OF VENOM OF BEES, UNINTENTIONAL, SUBSEQUENT ENCOUNTER: Primary | ICD-10-CM

## 2023-10-09 PROCEDURE — 95117 IMMUNOTHERAPY INJECTIONS: CPT

## 2023-10-23 ENCOUNTER — LAB (OUTPATIENT)
Dept: FAMILY MEDICINE | Facility: CLINIC | Age: 63
End: 2023-10-23
Payer: COMMERCIAL

## 2023-10-23 ENCOUNTER — TELEPHONE (OUTPATIENT)
Dept: FAMILY MEDICINE | Facility: CLINIC | Age: 63
End: 2023-10-23
Payer: COMMERCIAL

## 2023-10-23 DIAGNOSIS — Z12.11 SPECIAL SCREENING FOR MALIGNANT NEOPLASMS, COLON: Primary | ICD-10-CM

## 2023-10-23 DIAGNOSIS — Z12.11 SPECIAL SCREENING FOR MALIGNANT NEOPLASMS, COLON: ICD-10-CM

## 2023-10-23 NOTE — TELEPHONE ENCOUNTER
Order/Referral Request    Who is requesting: Patient    Orders being requested: Cologuard    Reason service is needed/diagnosis: patient never received Cologuard in the mail- I did not see any cologuard orders in chart    When are orders needed by: asap- She was advised to have this done in October    Has this been discussed with Provider: Yes    Does patient have a preference on a Group/Provider/Facility? N/a    Does patient have an appointment scheduled?: Yes: 11/20/23 for BP re check    Where to send orders: Place orders within Epic    [facility] or would you prefer to receive a phone call?:   Patient would prefer a phone call   Okay to leave a detailed message?: Yes at Cell number on file:    Telephone Information:   Mobile [phone]

## 2023-11-15 LAB — NONINV COLON CA DNA+OCC BLD SCRN STL QL: NEGATIVE

## 2023-11-18 DIAGNOSIS — I10 HYPERTENSION GOAL BP (BLOOD PRESSURE) < 140/90: ICD-10-CM

## 2023-11-19 RX ORDER — AMLODIPINE BESYLATE 5 MG/1
5 TABLET ORAL DAILY
Qty: 90 TABLET | Refills: 1 | OUTPATIENT
Start: 2023-11-19

## 2023-11-20 ENCOUNTER — OFFICE VISIT (OUTPATIENT)
Dept: FAMILY MEDICINE | Facility: CLINIC | Age: 63
End: 2023-11-20
Payer: COMMERCIAL

## 2023-11-20 VITALS
BODY MASS INDEX: 25.74 KG/M2 | SYSTOLIC BLOOD PRESSURE: 154 MMHG | HEART RATE: 66 BPM | HEIGHT: 64 IN | OXYGEN SATURATION: 98 % | TEMPERATURE: 98 F | DIASTOLIC BLOOD PRESSURE: 70 MMHG | WEIGHT: 150.8 LBS | RESPIRATION RATE: 16 BRPM

## 2023-11-20 DIAGNOSIS — R73.03 PREDIABETES: ICD-10-CM

## 2023-11-20 DIAGNOSIS — I10 HYPERTENSION GOAL BP (BLOOD PRESSURE) < 140/90: Primary | ICD-10-CM

## 2023-11-20 DIAGNOSIS — E78.5 HYPERLIPIDEMIA LDL GOAL <130: ICD-10-CM

## 2023-11-20 PROCEDURE — 99213 OFFICE O/P EST LOW 20 MIN: CPT | Performed by: FAMILY MEDICINE

## 2023-11-20 RX ORDER — AMLODIPINE BESYLATE 5 MG/1
5 TABLET ORAL DAILY
Qty: 90 TABLET | Refills: 1 | Status: SHIPPED | OUTPATIENT
Start: 2023-11-20 | End: 2024-05-06

## 2023-11-20 ASSESSMENT — ENCOUNTER SYMPTOMS: HYPERTENSION: 1

## 2023-11-20 ASSESSMENT — PAIN SCALES - GENERAL: PAINLEVEL: NO PAIN (0)

## 2023-11-20 NOTE — PROGRESS NOTES
"  Assessment & Plan     (I10) Hypertension goal BP (blood pressure) < 140/90  (primary encounter diagnosis)  Comment: not to goal in office  Plan: amLODIPine (NORVASC) 5 MG tablet, Basic         metabolic panel  (Ca, Cl, CO2, Creat, Gluc, K,         Na, BUN)        Has element of white coat htn, home readings on validated cuff to goal  Will send readings from home this week    (E78.5) Hyperlipidemia LDL goal <130  Comment: stable, no side effects   Plan: Lipid panel reflex to direct LDL Fasting                     BMI:   Estimated body mass index is 25.88 kg/m  as calculated from the following:    Height as of this encounter: 1.626 m (5' 4\").    Weight as of this encounter: 68.4 kg (150 lb 12.8 oz).   Weight management plan: Discussed healthy diet and exercise guidelines    See Patient Instructions    Lauren Saez MD  [facility]   Kaleigh is a 63 year old, presenting for the following health issues:  Hypertension        11/20/2023     6:51 AM   Additional Questions   Roomed by Mik       History of Present Illness       Hyperlipidemia:  She presents for follow up of hyperlipidemia.   She is taking medication to lower cholesterol. She is not having myalgia or other side effects to statin medications.    Hypertension: She presents for follow up of hypertension.  She does check blood pressure  regularly outside of the clinic. Outpatient blood pressures have not been over 140/90. She follows a low salt diet.     Reason for visit:  Cholesterol recheck    She eats 2-3 servings of fruits and vegetables daily.She consumes 2 sweetened beverage(s) daily.She exercises with enough effort to increase her heart rate 60 or more minutes per day.  She exercises with enough effort to increase her heart rate 5 days per week.   She is taking medications regularly.     Hypertension ROS: taking medications as instructed, no medication side effects noted, no TIA's, no chest pain on exertion, no " "dyspnea on exertion, no swelling of ankles.  No headache or visual changes.              Review of Systems   Constitutional, HEENT, cardiovascular, pulmonary, gi and gu systems are negative, except as otherwise noted.      Objective    BP (!) 154/70   Pulse 66   Temp 98  F (36.7  C) (Oral)   Resp 16   Ht 1.626 m (5' 4\")   Wt 68.4 kg (150 lb 12.8 oz)   SpO2 98%   BMI 25.88 kg/m    Body mass index is 25.88 kg/m .  Physical Exam   GENERAL: healthy, alert and no distress  EYES: Eyes grossly normal to inspection, PERRL and conjunctivae and sclerae normal  MS: no gross musculoskeletal defects noted, no edema  SKIN: no suspicious lesions or rashes  PSYCH: mentation appears normal, affect normal/bright                      "

## 2023-11-21 ENCOUNTER — ALLIED HEALTH/NURSE VISIT (OUTPATIENT)
Dept: ALLERGY | Facility: CLINIC | Age: 63
End: 2023-11-21
Payer: COMMERCIAL

## 2023-11-21 DIAGNOSIS — T63.441D TOXIC EFFECT OF VENOM OF BEES, UNINTENTIONAL, SUBSEQUENT ENCOUNTER: Primary | ICD-10-CM

## 2023-11-21 DIAGNOSIS — J30.9 ALLERGIC RHINITIS: ICD-10-CM

## 2023-11-21 PROCEDURE — 95117 IMMUNOTHERAPY INJECTIONS: CPT

## 2023-11-21 NOTE — PROGRESS NOTES
Aide Tyson presents to clinic today at the request of Mary Lou Garcia MD (ordering provider) for Venom immunotherapy injection(s).       This service provided today was under the care of Zafar Jolley MD ; the supervising provider of the day; who was available if needed.      Patient presented after waiting 30 minutes with no reaction to  injections. Discharged from clinic.    Sonia PEDERSEN RN, BSN, PHN

## 2024-01-09 ENCOUNTER — ALLIED HEALTH/NURSE VISIT (OUTPATIENT)
Dept: ALLERGY | Facility: CLINIC | Age: 64
End: 2024-01-09
Payer: COMMERCIAL

## 2024-01-09 DIAGNOSIS — T63.441D TOXIC EFFECT OF VENOM OF BEES, UNINTENTIONAL, SUBSEQUENT ENCOUNTER: Primary | ICD-10-CM

## 2024-01-09 PROCEDURE — 95117 IMMUNOTHERAPY INJECTIONS: CPT

## 2024-01-09 NOTE — PROGRESS NOTES
Aide Tyson presents to clinic today at the request of Mary Lou Garcia MD (ordering provider) for Allergy Immunotherapy injection(s).       This service provided today was under the care of Mary Lou Garcia MD; the supervising provider of the day; who was available if needed.      Patient presented after waiting 30 minutes with no reaction to  injections. Discharged from clinic.    Sonia PEDERSEN RN, BSN, PHN

## 2024-02-19 ENCOUNTER — ALLIED HEALTH/NURSE VISIT (OUTPATIENT)
Dept: ALLERGY | Facility: CLINIC | Age: 64
End: 2024-02-19
Payer: COMMERCIAL

## 2024-02-19 DIAGNOSIS — T63.441D TOXIC EFFECT OF VENOM OF BEES, UNINTENTIONAL, SUBSEQUENT ENCOUNTER: Primary | ICD-10-CM

## 2024-02-19 PROCEDURE — 95117 IMMUNOTHERAPY INJECTIONS: CPT

## 2024-03-11 ENCOUNTER — TELEPHONE (OUTPATIENT)
Dept: FAMILY MEDICINE | Facility: CLINIC | Age: 64
End: 2024-03-11
Payer: COMMERCIAL

## 2024-03-11 NOTE — TELEPHONE ENCOUNTER
[employer] message sent to patient.  Jolanta REED    [facility]

## 2024-03-11 NOTE — TELEPHONE ENCOUNTER
Patient Returning Call    Reason for call:  Patient unsure if they need to come in for labs prior to 06/10/24 appt, please check and advise.    Information relayed to patient:  24/48 hrs for a call back    Patient has additional questions:  No      Could we send this information to you in [facility] or would you prefer to receive a phone call?:   Patient would prefer a phone call   Okay to leave a detailed message?: Yes at Cell number on file:    Telephone Information:   Mobile [phone]

## 2024-03-18 ENCOUNTER — OFFICE VISIT (OUTPATIENT)
Dept: ALLERGY | Facility: CLINIC | Age: 64
End: 2024-03-18
Payer: COMMERCIAL

## 2024-03-18 VITALS — OXYGEN SATURATION: 99 % | HEART RATE: 65 BPM | DIASTOLIC BLOOD PRESSURE: 78 MMHG | SYSTOLIC BLOOD PRESSURE: 144 MMHG

## 2024-03-18 DIAGNOSIS — T63.481A ANAPHYLAXIS DUE TO INSECT VENOM: Primary | ICD-10-CM

## 2024-03-18 DIAGNOSIS — T78.2XXA ANAPHYLAXIS DUE TO INSECT VENOM: Primary | ICD-10-CM

## 2024-03-18 PROCEDURE — 99213 OFFICE O/P EST LOW 20 MIN: CPT | Performed by: ALLERGY & IMMUNOLOGY

## 2024-03-18 NOTE — PROGRESS NOTES
Aide Tyson was seen in the Allergy Clinic at [facility].      Aide Tyson is a 63 year old Choose not to answer female who is seen today for a follow-up visit.    Started VIT in 2018 and reached maintenance dose in 2019. Last stung on her hand in 2022 - had local swelling but no other symptoms. Took cetirizine but no other medications and was not seen in the ED. No adverse reactions to immunotherapy.    Past Medical History:   Diagnosis Date    Cervical high risk HPV (human papillomavirus) test positive 2019    see problem list    Elevated cholesterol     Low HDL - 41 in     Hypertension      Family History   Problem Relation Age of Onset    Heart Disease Father     Dementia Father     Diabetes Father         At 80 yrs old he got diabetes    Neurologic Disorder Brother     Lipids Sister     Hyperlipidemia Sister     Lipids Sister     Myocardial Infarction Mother     Hypertension Mother     Hyperlipidemia Brother      Social History     Tobacco Use    Smoking status: Light Smoker     Packs/day: 0.50     Years: 5.00     Additional pack years: 0.00     Total pack years: 2.50     Types: Cigarettes     Start date: 1985     Last attempt to quit: 2008     Years since quittin.2    Smokeless tobacco: Never   Vaping Use    Vaping Use: Never used   Substance Use Topics    Alcohol use: Yes     Comment: rare    Drug use: No     Social History     Social History Narrative    Not on file       Past medical, family, and social history were reviewed.        Current Outpatient Medications:     amLODIPine (NORVASC) 5 MG tablet, Take 1 tablet (5 mg) by mouth daily, Disp: 90 tablet, Rfl: 1    atorvastatin (LIPITOR) 10 MG tablet, Take 1 tablet (10 mg) by mouth daily, Disp: 90 tablet, Rfl: 3    ORDER FOR ALLERGEN IMMUNOTHERAPY, Mixed Vespid Venom 300 mcg/mL HS 12 ml Diluent: HSA qs to 5ml, Disp: 12 mL, Rfl: prn    ORDER FOR ALLERGEN IMMUNOTHERAPY, Wasp Venom 100 mcg/mL  HS 12 ml Diluent: HSA qs to 5ml, Disp: 12 mL, Rfl: prn  Allergies   Allergen Reactions    Bees Hives and Swelling    Pyridoxine Hives     Other reaction(s): Angioedema       EXAM:   BP (!) 144/78 (BP Location: Right arm, Patient Position: Sitting, Cuff Size: Adult Regular)   Pulse 65   SpO2 99%   Physical Exam  Vitals and nursing note reviewed.   Constitutional:       Appearance: Normal appearance.   HENT:      Head: Normocephalic and atraumatic.      Right Ear: External ear normal.      Left Ear: External ear normal.      Nose: No rhinorrhea.      Mouth/Throat:      Mouth: Mucous membranes are moist. No oral lesions.      Pharynx: Oropharynx is clear. Uvula midline. No posterior oropharyngeal erythema.   Eyes:      General: Lids are normal.      Extraocular Movements: Extraocular movements intact.      Conjunctiva/sclera: Conjunctivae normal.   Neck:      Comments: No asymmetry, masses, or scars  Cardiovascular:      Rate and Rhythm: Normal rate and regular rhythm.      Heart sounds: S1 normal and S2 normal. No murmur heard.  Pulmonary:      Effort: Pulmonary effort is normal. No respiratory distress.      Breath sounds: Normal breath sounds and air entry.   Musculoskeletal:      Comments: No musculoskeletal defects appreciated   Skin:     General: Skin is warm and dry.      Findings: No lesion or rash.   Neurological:      General: No focal deficit present.      Mental Status: She is alert.   Psychiatric:         Mood and Affect: Mood and affect normal.           WORKUP:  None    ASSESSMENT/PLAN:  Aide Tyson is a 63 year old female here for a follow-up visit.    1. 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 We reviewed the guidelines regarding venom allergies and recommendations for duration of treatment and carrying an epinephrine auto-injector. She did not experience loss of consciousness during her initial reaction and she has not had any  subsequent anaphylactic reactions to insect stings or VIT treatment. As she has now completed 5 years of treatment at the maintenance dose it is reasonable to discontinue treatment at this time. We also discussed her risk for anaphylaxis now that she has completed treatment as well as whether it is necessary for her to carry an epinephrine auto-injector in the future. Kaleigh verbalized understanding and was comfortable with discontinuing VIT at this time.       Follow-up as needed      Thank you for allowing me to participate in the care of Aide Tyson.      Mary Lou Garcia MD, FAAAAI  Allergy/Immunology  [facility]      Chart documentation done in part with Dragon Voice Recognition Software. Although reviewed after completion, some word and grammatical errors may remain.

## 2024-03-18 NOTE — LETTER
3/18/2024         RE: Aide Tyson  [address] [facility] [address]        Dear Colleague,    Thank you for referring your patient, Aide Tyson, to the [facility]. Please see a copy of my visit note below.    Aide Tyson was seen in the Allergy Clinic at [facility].      Aide Tyson is a 63 year old Choose not to answer female who is seen today for a follow-up visit.    Started VIT in 2018 and reached maintenance dose in 2019. Last stung on her hand in 2022 - had local swelling but no other symptoms. Took cetirizine but no other medications and was not seen in the ED. No adverse reactions to immunotherapy.    Past Medical History:   Diagnosis Date     Cervical high risk HPV (human papillomavirus) test positive 2019    see problem list     Elevated cholesterol     Low HDL - 41 in      Hypertension      Family History   Problem Relation Age of Onset     Heart Disease Father      Dementia Father      Diabetes Father         At 80 yrs old he got diabetes     Neurologic Disorder Brother      Lipids Sister      Hyperlipidemia Sister      Lipids Sister      Myocardial Infarction Mother      Hypertension Mother      Hyperlipidemia Brother      Social History     Tobacco Use     Smoking status: Light Smoker     Packs/day: 0.50     Years: 5.00     Additional pack years: 0.00     Total pack years: 2.50     Types: Cigarettes     Start date: 1985     Last attempt to quit: 2008     Years since quittin.2     Smokeless tobacco: Never   Vaping Use     Vaping Use: Never used   Substance Use Topics     Alcohol use: Yes     Comment: rare     Drug use: No     Social History     Social History Narrative     Not on file       Past medical, family, and social history were reviewed.        Current Outpatient Medications:      amLODIPine (NORVASC) 5 MG tablet, Take 1 tablet (5 mg) by mouth daily, Disp: 90 tablet, Rfl: 1      atorvastatin (LIPITOR) 10 MG tablet, Take 1 tablet (10 mg) by mouth daily, Disp: 90 tablet, Rfl: 3     ORDER FOR ALLERGEN IMMUNOTHERAPY, Mixed Vespid Venom 300 mcg/mL HS 12 ml Diluent: HSA qs to 5ml, Disp: 12 mL, Rfl: prn     ORDER FOR ALLERGEN IMMUNOTHERAPY, Wasp Venom 100 mcg/mL HS 12 ml Diluent: HSA qs to 5ml, Disp: 12 mL, Rfl: prn  Allergies   Allergen Reactions     Bees Hives and Swelling     Pyridoxine Hives     Other reaction(s): Angioedema       EXAM:   BP (!) 144/78 (BP Location: Right arm, Patient Position: Sitting, Cuff Size: Adult Regular)   Pulse 65   SpO2 99%   Physical Exam  Vitals and nursing note reviewed.   Constitutional:       Appearance: Normal appearance.   HENT:      Head: Normocephalic and atraumatic.      Right Ear: External ear normal.      Left Ear: External ear normal.      Nose: No rhinorrhea.      Mouth/Throat:      Mouth: Mucous membranes are moist. No oral lesions.      Pharynx: Oropharynx is clear. Uvula midline. No posterior oropharyngeal erythema.   Eyes:      General: Lids are normal.      Extraocular Movements: Extraocular movements intact.      Conjunctiva/sclera: Conjunctivae normal.   Neck:      Comments: No asymmetry, masses, or scars  Cardiovascular:      Rate and Rhythm: Normal rate and regular rhythm.      Heart sounds: S1 normal and S2 normal. No murmur heard.  Pulmonary:      Effort: Pulmonary effort is normal. No respiratory distress.      Breath sounds: Normal breath sounds and air entry.   Musculoskeletal:      Comments: No musculoskeletal defects appreciated   Skin:     General: Skin is warm and dry.      Findings: No lesion or rash.   Neurological:      General: No focal deficit present.      Mental Status: She is alert.   Psychiatric:         Mood and Affect: Mood and affect normal.           WORKUP:  None    ASSESSMENT/PLAN:  Aide Tyson is a 63 year old female here for a follow-up visit.    1. 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 We reviewed the guidelines regarding venom allergies and recommendations for duration of treatment and carrying an epinephrine auto-injector. She did not experience loss of consciousness during her initial reaction and she has not had any subsequent anaphylactic reactions to insect stings or VIT treatment. As she has now completed 5 years of treatment at the maintenance dose it is reasonable to discontinue treatment at this time. We also discussed her risk for anaphylaxis now that she has completed treatment as well as whether it is necessary for her to carry an epinephrine auto-injector in the future. Kaleigh verbalized understanding and was comfortable with discontinuing VIT at this time.       Follow-up as needed      Thank you for allowing me to participate in the care of Aide Tyson.      Mary Lou Garcia MD, [facility]  Allergy/Immunology  [facility]      Chart documentation done in part with Dragon Voice Recognition Software. Although reviewed after completion, some word and grammatical errors may remain.      Again, thank you for allowing me to participate in the care of your patient.        Sincerely,        Mary Lou Garcia MD

## 2024-05-06 DIAGNOSIS — I10 HYPERTENSION GOAL BP (BLOOD PRESSURE) < 140/90: ICD-10-CM

## 2024-05-06 RX ORDER — AMLODIPINE BESYLATE 5 MG/1
5 TABLET ORAL DAILY
Qty: 90 TABLET | Refills: 0 | Status: SHIPPED | OUTPATIENT
Start: 2024-05-06 | End: 2024-06-10

## 2024-05-10 DIAGNOSIS — E78.5 HYPERLIPIDEMIA LDL GOAL <130: ICD-10-CM

## 2024-05-11 RX ORDER — ATORVASTATIN CALCIUM 10 MG/1
10 TABLET, FILM COATED ORAL DAILY
Qty: 30 TABLET | Refills: 0 | Status: SHIPPED | OUTPATIENT
Start: 2024-05-11 | End: 2024-06-04

## 2024-06-01 ENCOUNTER — LAB (OUTPATIENT)
Dept: LAB | Facility: CLINIC | Age: 64
End: 2024-06-01
Payer: COMMERCIAL

## 2024-06-01 DIAGNOSIS — R73.03 PREDIABETES: ICD-10-CM

## 2024-06-01 DIAGNOSIS — E78.5 HYPERLIPIDEMIA LDL GOAL <130: ICD-10-CM

## 2024-06-01 DIAGNOSIS — I10 HYPERTENSION GOAL BP (BLOOD PRESSURE) < 140/90: ICD-10-CM

## 2024-06-01 LAB — HBA1C MFR BLD: 5.7 % (ref 0–5.6)

## 2024-06-01 PROCEDURE — 80061 LIPID PANEL: CPT

## 2024-06-01 PROCEDURE — 36415 COLL VENOUS BLD VENIPUNCTURE: CPT

## 2024-06-01 PROCEDURE — 83036 HEMOGLOBIN GLYCOSYLATED A1C: CPT

## 2024-06-01 PROCEDURE — 80048 BASIC METABOLIC PNL TOTAL CA: CPT

## 2024-06-02 LAB
ANION GAP SERPL CALCULATED.3IONS-SCNC: 8 MMOL/L (ref 7–15)
BUN SERPL-MCNC: 15.3 MG/DL (ref 8–23)
CALCIUM SERPL-MCNC: 9.7 MG/DL (ref 8.8–10.2)
CHLORIDE SERPL-SCNC: 106 MMOL/L (ref 98–107)
CHOLEST SERPL-MCNC: 191 MG/DL
CREAT SERPL-MCNC: 0.86 MG/DL (ref 0.51–0.95)
DEPRECATED HCO3 PLAS-SCNC: 25 MMOL/L (ref 22–29)
EGFRCR SERPLBLD CKD-EPI 2021: 76 ML/MIN/1.73M2
FASTING STATUS PATIENT QL REPORTED: YES
FASTING STATUS PATIENT QL REPORTED: YES
GLUCOSE SERPL-MCNC: 94 MG/DL (ref 70–99)
HDLC SERPL-MCNC: 44 MG/DL
LDLC SERPL CALC-MCNC: 110 MG/DL
NONHDLC SERPL-MCNC: 147 MG/DL
POTASSIUM SERPL-SCNC: 4.4 MMOL/L (ref 3.4–5.3)
SODIUM SERPL-SCNC: 139 MMOL/L (ref 135–145)
TRIGL SERPL-MCNC: 184 MG/DL

## 2024-06-04 DIAGNOSIS — E78.5 HYPERLIPIDEMIA LDL GOAL <130: ICD-10-CM

## 2024-06-04 RX ORDER — ATORVASTATIN CALCIUM 10 MG/1
TABLET, FILM COATED ORAL
Qty: 90 TABLET | Refills: 0 | Status: SHIPPED | OUTPATIENT
Start: 2024-06-04 | End: 2024-06-10

## 2024-06-05 SDOH — HEALTH STABILITY: PHYSICAL HEALTH: ON AVERAGE, HOW MANY MINUTES DO YOU ENGAGE IN EXERCISE AT THIS LEVEL?: 150+ MIN

## 2024-06-05 SDOH — HEALTH STABILITY: PHYSICAL HEALTH: ON AVERAGE, HOW MANY DAYS PER WEEK DO YOU ENGAGE IN MODERATE TO STRENUOUS EXERCISE (LIKE A BRISK WALK)?: 6 DAYS

## 2024-06-05 ASSESSMENT — SOCIAL DETERMINANTS OF HEALTH (SDOH): HOW OFTEN DO YOU GET TOGETHER WITH FRIENDS OR RELATIVES?: ONCE A WEEK

## 2024-06-10 ENCOUNTER — OFFICE VISIT (OUTPATIENT)
Dept: FAMILY MEDICINE | Facility: CLINIC | Age: 64
End: 2024-06-10
Attending: FAMILY MEDICINE
Payer: COMMERCIAL

## 2024-06-10 VITALS
HEART RATE: 70 BPM | RESPIRATION RATE: 20 BRPM | DIASTOLIC BLOOD PRESSURE: 70 MMHG | SYSTOLIC BLOOD PRESSURE: 138 MMHG | HEIGHT: 64 IN | BODY MASS INDEX: 25.1 KG/M2 | WEIGHT: 147 LBS | OXYGEN SATURATION: 99 % | TEMPERATURE: 97.5 F

## 2024-06-10 DIAGNOSIS — Z00.00 ROUTINE GENERAL MEDICAL EXAMINATION AT A HEALTH CARE FACILITY: Primary | ICD-10-CM

## 2024-06-10 DIAGNOSIS — R73.03 PREDIABETES: ICD-10-CM

## 2024-06-10 DIAGNOSIS — E78.5 HYPERLIPIDEMIA LDL GOAL <130: ICD-10-CM

## 2024-06-10 DIAGNOSIS — I10 HYPERTENSION GOAL BP (BLOOD PRESSURE) < 140/90: ICD-10-CM

## 2024-06-10 PROCEDURE — 99396 PREV VISIT EST AGE 40-64: CPT | Performed by: FAMILY MEDICINE

## 2024-06-10 PROCEDURE — 99214 OFFICE O/P EST MOD 30 MIN: CPT | Mod: 25 | Performed by: FAMILY MEDICINE

## 2024-06-10 RX ORDER — AMLODIPINE BESYLATE 5 MG/1
5 TABLET ORAL DAILY
Qty: 90 TABLET | Refills: 2 | Status: SHIPPED | OUTPATIENT
Start: 2024-06-10

## 2024-06-10 RX ORDER — ATORVASTATIN CALCIUM 10 MG/1
10 TABLET, FILM COATED ORAL DAILY
Qty: 90 TABLET | Refills: 2 | Status: SHIPPED | OUTPATIENT
Start: 2024-06-10

## 2024-06-10 ASSESSMENT — PAIN SCALES - GENERAL: PAINLEVEL: NO PAIN (0)

## 2024-06-10 NOTE — PROGRESS NOTES
"Preventive Care Visit  [facility]  Lauren Saez MD, Family Medicine  Jacques 10, 2024      Assessment & Plan     (Z00.00) Routine general medical examination at a health care facility  (primary encounter diagnosis)  Comment: preventive needs reviewed   Plan: see orders in Epic.     (I10) Hypertension goal BP (blood pressure) < 140/90  Comment: to goal  Plan: continue amlodipine, recent refill x 90 days ok to refill upon request    (E78.5) Hyperlipidemia LDL goal <130  Comment: stable  Plan: recent refill. Refill x 1 yr     (R73.03) Prediabetes  Comment: up slightly due to eating habits  Plan: Make healthier choices  Recheck in 1 year            Nicotine/Tobacco Cessation  She reports that she has been smoking cigarettes. She started smoking about 38 years ago. She has a 11.2 pack-year smoking history. She has never used smokeless tobacco.  Nicotine/Tobacco Cessation Plan  Information offered: Patient not interested at this time      BMI  Estimated body mass index is 25.04 kg/m  as calculated from the following:    Height as of this encounter: 1.632 m (5' 4.25\").    Weight as of this encounter: 66.7 kg (147 lb).       Counseling  Appropriate preventive services were discussed with this patient, including applicable screening as appropriate for fall prevention, nutrition, physical activity, Tobacco-use cessation, weight loss and cognition.  Checklist reviewing preventive services available has been given to the patient.  Reviewed patient's diet, addressing concerns and/or questions.       See Patient Instructions    Bryant Farris is a 63 year old, presenting for the following:  Physical        6/10/2024     7:40 AM   Additional Questions   Roomed by eddie   Accompanied by self         6/10/2024     7:40 AM   Patient Reported Additional Medications   Patient reports taking the following new medications see chart        Health Care Directive  Patient does not have a Health Care Directive " or Living Will: Discussed advance care planning with patient; information given to patient to review.    HPI  No concerns              6/5/2024   General Health   How would you rate your overall physical health? Good   Feel stress (tense, anxious, or unable to sleep) Not at all         6/5/2024   Nutrition   Three or more servings of calcium each day? Yes   Diet: Regular (no restrictions)   How many servings of fruit and vegetables per day? 4 or more   How many sweetened beverages each day? 0-1         6/5/2024   Exercise   Days per week of moderate/strenous exercise 6 days   Average minutes spent exercising at this level 150+ min         6/5/2024   Social Factors   Frequency of gathering with friends or relatives Once a week   Worry food won't last until get money to buy more No   Food not last or not have enough money for food? No   Do you have housing?  Yes   Are you worried about losing your housing? No   Lack of transportation? No   Unable to get utilities (heat,electricity)? No         6/5/2024   Fall Risk   Fallen 2 or more times in the past year? No   Trouble with walking or balance? No          6/5/2024   Dental   Dentist two times every year? Yes         6/5/2024   TB Screening   Were you born outside of the [place]? No         Today's PHQ-2 Score:       6/10/2024     7:20 AM   PHQ-2 ( 1999 [employer])   Q1: Little interest or pleasure in doing things 0   Q2: Feeling down, depressed or hopeless 0   PHQ-2 Score 0   Q1: Little interest or pleasure in doing things Not at all   Q2: Feeling down, depressed or hopeless Not at all   PHQ-2 Score 0           6/5/2024   Substance Use   If I could quit smoking, I would Somewhat agree   I want to quit somking, worry about health affects Somewhat agree   Willing to make a plan to quit smoking Somewhat agree   Willing to cut down before quitting Somewhat agree   Alcohol more than 3/day or more than 7/wk No   Do you use any other substances recreationally? No     Social History      Tobacco Use    Smoking status: Light Smoker     Current packs/day: 0.00     Average packs/day: 0.5 packs/day for 22.4 years (11.2 ttl pk-yrs)     Types: Cigarettes     Start date: 1985     Last attempt to quit: 2008     Years since quittin.4    Smokeless tobacco: Never   Vaping Use    Vaping status: Never Used   Substance Use Topics    Alcohol use: Yes     Comment: rare    Drug use: No             2024   Breast Cancer Screening   Family history of breast, colon, or ovarian cancer? No / Unknown         2023   LAST FHS-7 RESULTS   1st degree relative breast or ovarian cancer No   Any relative bilateral breast cancer No   Any male have breast cancer No   Any ONE woman have BOTH breast AND ovarian cancer No   Any woman with breast cancer before 50yrs No   2 or more relatives with breast AND/OR ovarian cancer No   2 or more relatives with breast AND/OR bowel cancer No        Mammogram Screening - Mammogram every 1-2 years updated in Health Maintenance based on mutual decision making    G 1 P 1   No LMP recorded. Patient is postmenopausal.     Fasting: No   Td: tdap 2016       Flu: 2022      Covid: 2021      Shingrix: done      PPV: discussed       RSV: discussed               Cholesterol:   Lab Results   Component Value Date    CHOL 191 2024    CHOL 214 2021     Lab Results   Component Value Date    HDL 44 2024    HDL 56 2021     Lab Results   Component Value Date     2024     2021     Lab Results   Component Value Date    TRIG 184 2024    TRIG 155 2021     Lab Results   Component Value Date    CHOLHDLRATIO 6.1 2009         MM2023  Dexa:  NA     Flex/colo: 2023 q3y CG      Seat Belt: Yes    Sunscreen use: Yes   Calcium Intake: adeq  Health Care Directive: No  Sexually Active: Yes     Current contraception: none  History of abnormal Pap smear: Yes: see prob list  Family history of colon/breast/ovarian cancer:  No  Regular self breast exam: Yes  History of abnormal mammogram: No          6/5/2024   STI Screening   New sexual partner(s) since last STI/HIV test? No     History of abnormal Pap smear: YES - reflected in Problem List and Health Maintenance accordingly        Latest Ref Rng & Units 5/24/2023     8:12 AM 3/25/2020     8:55 AM 3/25/2020     8:48 AM   PAP / HPV   PAP  Negative for Intraepithelial Lesion or Malignancy (NILM)      PAP (Historical)    NIL    HPV 16 DNA Negative Negative  Negative     HPV 18 DNA Negative Negative  Negative     Other HR HPV Negative Negative  Negative       ASCVD Risk   The 10-year ASCVD risk score (Israel CRUZ, et al., 2019) is: 12.7%    Values used to calculate the score:      Age: 63 years      Sex: Female      Is Non- : No      Diabetic: No      Tobacco smoker: Yes      Systolic Blood Pressure: 138 mmHg      Is BP treated: Yes      HDL Cholesterol: 44 mg/dL      Total Cholesterol: 191 mg/dL           Reviewed and updated as needed this visit by Provider   Tobacco  Allergies  Meds  Problems  Med Hx  Surg Hx  Fam Hx            Labs reviewed in EPIC  BP Readings from Last 3 Encounters:   06/10/24 138/70   03/18/24 (!) 144/78   11/20/23 (!) 154/70    Wt Readings from Last 3 Encounters:   06/10/24 66.7 kg (147 lb)   11/20/23 68.4 kg (150 lb 12.8 oz)   05/24/23 70.2 kg (154 lb 12.8 oz)                  Patient Active Problem List   Diagnosis    CARDIOVASCULAR SCREENING; LDL GOAL LESS THAN 160    Anaphylaxis, subsequent encounter    Cervical high risk HPV (human papillomavirus) test positive    Hypertension goal BP (blood pressure) < 140/90    Prediabetes    Hyperlipidemia LDL goal <130     Past Surgical History:   Procedure Laterality Date    NO HISTORY OF SURGERY         Social History     Tobacco Use    Smoking status: Light Smoker     Current packs/day: 0.00     Average packs/day: 0.5 packs/day for 22.4 years (11.2 ttl pk-yrs)     Types: Cigarettes  "    Start date: 1985     Last attempt to quit: 2008     Years since quittin.4    Smokeless tobacco: Never   Substance Use Topics    Alcohol use: Yes     Comment: rare     Family History   Problem Relation Age of Onset    Heart Disease Father     Dementia Father     Diabetes Father         At 80 yrs old he got diabetes    Neurologic Disorder Brother     Lipids Sister     Hyperlipidemia Sister     Lipids Sister     Myocardial Infarction Mother     Hypertension Mother     Hyperlipidemia Brother          Current Outpatient Medications   Medication Sig Dispense Refill    amLODIPine (NORVASC) 5 MG tablet TAKE 1 TABLET (5 MG) BY MOUTH DAILY 90 tablet 0    atorvastatin (LIPITOR) 10 MG tablet TAKE 1 TABLET (10 MG) BY MOUTH DAILY NEEDS TO KEEP APPOINTMENT FOR FURTHER/LONGER REFILLS 90 tablet 0         Review of Systems  Constitutional, neuro, ENT, endocrine, pulmonary, cardiac, gastrointestinal, genitourinary, musculoskeletal, integument and psychiatric systems are negative, except as otherwise noted.     Objective    Exam  /70   Pulse 70   Temp 97.5  F (36.4  C) (Tympanic)   Resp 20   Ht 1.632 m (5' 4.25\")   Wt 66.7 kg (147 lb)   SpO2 99%   BMI 25.04 kg/m     Estimated body mass index is 25.04 kg/m  as calculated from the following:    Height as of this encounter: 1.632 m (5' 4.25\").    Weight as of this encounter: 66.7 kg (147 lb).    Physical Exam  GENERAL: alert and no distress  EYES: Eyes grossly normal to inspection, PERRL and conjunctivae and sclerae normal  HENT: ear canals and TM's normal, nose and mouth without ulcers or lesions  NECK: no adenopathy, no asymmetry, masses, or scars  RESP: lungs clear to auscultation - no rales, rhonchi or wheezes  BREAST: normal without masses, tenderness or nipple discharge and no palpable axillary masses or adenopathy  CV: regular rate and rhythm, normal S1 S2, no S3 or S4, no murmur, click or rub, no peripheral edema  ABDOMEN: soft, nontender, no " hepatosplenomegaly, no masses and bowel sounds normal  MS: no gross musculoskeletal defects noted, no edema  SKIN: no suspicious lesions or rashes  NEURO: Normal strength and tone, mentation intact and speech normal  PSYCH: mentation appears normal, affect normal/bright        Signed Electronically by: Lauren Saez MD

## 2024-06-10 NOTE — PATIENT INSTRUCTIONS
"Preventive Care Advice   This is general advice we often give to help people stay healthy. Your care team may have specific advice just for you. Please talk to your care team about your own preventive care needs.  Lifestyle  Exercise at least 150 minutes each week (30 minutes a day, 5 days a week).  Do muscle strengthening activities 2 days a week. These help control your weight and prevent disease.  No smoking.  Wear sunscreen to prevent skin cancer.  Have your home tested for radon every 2 to 5 years. Radon is a colorless, odorless gas that can harm your lungs. To learn more, go to www.health.[facility].mn. and search for \"Radon in Homes.\"  Keep guns unloaded and locked up in a safe place like a safe or gun vault, or, use a gun lock and hide the keys. Always lock away bullets separately. To learn more, visit [place].gov and search for \"safe gun storage.\"  Nutrition  Eat 5 or more servings of fruits and vegetables each day.  Try wheat bread, brown rice and whole grain pasta (instead of white bread, rice, and pasta).  Get enough calcium and vitamin D. Check the label on foods and aim for 100% of the RDA (recommended daily allowance).  Regular exams  Have a dental exam and cleaning every 6 months.  See your health care team every year to talk about:  Any changes in your health.  Any medicines your care team has prescribed.  Preventive care, family planning, and ways to prevent chronic diseases.  Shots (vaccines)   HPV shots (up to age 26), if you've never had them before.  Hepatitis B shots (up to age 59), if you've never had them before.  COVID-19 shot: Get this shot when it's due.  Flu shot: Get a flu shot every year.  Tetanus shot: Get a tetanus shot every 10 years.  Pneumococcal, hepatitis A, and RSV shots: Ask your care team if you need these based on your risk.  Shingles shot (for age 50 and up).  General health tests  Diabetes screening:  Starting at age 35, Get screened for diabetes at least every 3 years.  If " you are younger than age 35, ask your care team if you should be screened for diabetes.  Cholesterol test: At age 39, start having a cholesterol test every 5 years, or more often if advised.  Bone density scan (DEXA): At age 50, ask your care team if you should have this scan for osteoporosis (brittle bones).  Hepatitis C: Get tested at least once in your life.  Abdominal aortic aneurysm screening: Talk to your doctor about having this screening if you:  Have ever smoked; and  Are biologically male; and  Are between the ages of 65 and 75.  STIs (sexually transmitted infections)  Before age 24: Ask your care team if you should be screened for STIs.  After age 24: Get screened for STIs if you're at risk. You are at risk for STIs (including HIV) if:  You are sexually active with more than one person.  You don't use condoms every time.  You or a partner was diagnosed with a sexually transmitted infection.  If you are at risk for HIV, ask about PrEP medicine to prevent HIV.  Get tested for HIV at least once in your life, whether you are at risk for HIV or not.  Cancer screening tests  Cervical cancer screening: If you have a cervix, begin getting regular cervical cancer screening tests at age 21. Most people who have regular screenings with normal results can stop after age 65. Talk about this with your provider.  Breast cancer scan (mammogram): If you've ever had breasts, begin having regular mammograms starting at age 40. This is a scan to check for breast cancer.  Colon cancer screening: It is important to start screening for colon cancer at age 45.  Have a colonoscopy test every 10 years (or more often if you're at risk) Or, ask your provider about stool tests like a FIT test every year or Cologuard test every 3 years.  To learn more about your testing options, visit: www.[employer]/864193.pdf.  For help making a decision, visit: rafa/pg64948.  Prostate cancer screening test: If you have a prostate and are age 55  to 69, ask your provider if you would benefit from a yearly prostate cancer screening test.  Lung cancer screening: If you are a current or former smoker age 50 to 80, ask your care team if ongoing lung cancer screenings are right for you.  For informational purposes only. Not to replace the advice of your health care provider. Copyright   2023 [employer]. All rights reserved. Clinically reviewed by the [facility]itions [facility]. [employer] 986242 - REV 04/24.

## 2024-06-13 ENCOUNTER — ALLIED HEALTH/NURSE VISIT (OUTPATIENT)
Dept: FAMILY MEDICINE | Facility: CLINIC | Age: 64
End: 2024-06-13
Payer: COMMERCIAL

## 2024-06-13 VITALS — SYSTOLIC BLOOD PRESSURE: 117 MMHG | DIASTOLIC BLOOD PRESSURE: 70 MMHG | HEART RATE: 56 BPM

## 2024-06-13 DIAGNOSIS — Z01.30 BLOOD PRESSURE CHECK: Primary | ICD-10-CM

## 2024-06-13 PROCEDURE — 99207 PR NO CHARGE NURSE ONLY: CPT | Performed by: FAMILY MEDICINE

## 2024-06-13 NOTE — PROGRESS NOTES
Aide Tyson was evaluated at [facility] on June 13, 2024 at which time her blood pressure was:    BP Readings from Last 3 Encounters:   06/13/24 117/70   06/10/24 138/70   03/18/24 (!) 144/78     Pulse Readings from Last 3 Encounters:   06/13/24 56   06/10/24 70   03/18/24 65       Reviewed lifestyle modifications for blood pressure control and reduction: including making healthy food choices, managing weight, getting regular exercise, smoking cessation, reducing alcohol consumption, monitoring blood pressure regularly.     Symptoms: None    BP Goal:< 140/90 mmHg    BP Assessment:  BP at goal    Potential Reasons for BP too high: NA - Not applicable    BP Follow-Up Plan: Recheck BP in 6 months at pharmacy    Recommendation to Provider: n/a    Note completed by: Jessa Varela, Pharm D  [facility]  [phone]

## 2024-12-12 ENCOUNTER — ALLIED HEALTH/NURSE VISIT (OUTPATIENT)
Dept: FAMILY MEDICINE | Facility: CLINIC | Age: 64
End: 2024-12-12
Payer: COMMERCIAL

## 2024-12-12 VITALS — SYSTOLIC BLOOD PRESSURE: 133 MMHG | DIASTOLIC BLOOD PRESSURE: 75 MMHG | HEART RATE: 60 BPM

## 2024-12-12 DIAGNOSIS — Z01.30 BLOOD PRESSURE CHECK: Primary | ICD-10-CM

## 2024-12-12 NOTE — PROGRESS NOTES
Aide Tyson was evaluated at [facility] on December 12, 2024 at which time her blood pressure was:    BP Readings from Last 3 Encounters:   12/12/24 133/75   06/13/24 117/70   06/10/24 138/70     Pulse Readings from Last 3 Encounters:   12/12/24 60   06/13/24 56   06/10/24 70       Reviewed lifestyle modifications for blood pressure control and reduction: including making healthy food choices, managing weight, getting regular exercise, smoking cessation, reducing alcohol consumption, monitoring blood pressure regularly.     Symptoms: None    BP Goal:< 140/90 mmHg    BP Assessment:  BP at goal.  Patient reported blood pressure which was taken at home    Potential Reasons for BP too high: NA - Not applicable    BP Follow-Up Plan: Recheck BP in 6 months at pharmacy    Recommendation to Provider: n/a     Note completed by: Jessa Varela, Pharm D  [facility]  [phone]

## 2025-03-24 ENCOUNTER — TELEPHONE (OUTPATIENT)
Dept: FAMILY MEDICINE | Facility: CLINIC | Age: 65
End: 2025-03-24
Payer: COMMERCIAL

## 2025-03-24 ENCOUNTER — PATIENT OUTREACH (OUTPATIENT)
Dept: CARE COORDINATION | Facility: CLINIC | Age: 65
End: 2025-03-24
Payer: COMMERCIAL

## 2025-03-24 DIAGNOSIS — Z12.31 ENCOUNTER FOR SCREENING MAMMOGRAM FOR BREAST CANCER: Primary | ICD-10-CM

## 2025-03-24 NOTE — TELEPHONE ENCOUNTER
General Call      Reason for Call:  mammogram questions    What are your questions or concerns:  patient is wondering if she has to come in every two years or three for a screening mammogram    Date of last appointment with provider: n/a    Could we send this information to you in [facility] or would you prefer to receive a phone call?:   No preference   Okay to leave a detailed message?: Yes at Cell number on file:    Telephone Information:   Mobile [phone]

## 2025-03-25 ENCOUNTER — PATIENT OUTREACH (OUTPATIENT)
Dept: CARE COORDINATION | Facility: CLINIC | Age: 65
End: 2025-03-25
Payer: COMMERCIAL

## 2025-03-25 NOTE — TELEPHONE ENCOUNTER
Left detailed message on patient's cell phone with provider's message given verbatim.    Also gave patient the scheduling number to call for her mammogram.  Jolanta REED    [facility]

## 2025-06-06 SDOH — HEALTH STABILITY: PHYSICAL HEALTH: ON AVERAGE, HOW MANY DAYS PER WEEK DO YOU ENGAGE IN MODERATE TO STRENUOUS EXERCISE (LIKE A BRISK WALK)?: 5 DAYS

## 2025-06-06 SDOH — HEALTH STABILITY: PHYSICAL HEALTH: ON AVERAGE, HOW MANY MINUTES DO YOU ENGAGE IN EXERCISE AT THIS LEVEL?: 150+ MIN

## 2025-06-06 ASSESSMENT — SOCIAL DETERMINANTS OF HEALTH (SDOH): HOW OFTEN DO YOU GET TOGETHER WITH FRIENDS OR RELATIVES?: ONCE A WEEK

## 2025-06-07 ENCOUNTER — LAB (OUTPATIENT)
Dept: LAB | Facility: CLINIC | Age: 65
End: 2025-06-07
Payer: COMMERCIAL

## 2025-06-07 DIAGNOSIS — I10 HYPERTENSION GOAL BP (BLOOD PRESSURE) < 140/90: ICD-10-CM

## 2025-06-07 DIAGNOSIS — E78.5 HYPERLIPIDEMIA LDL GOAL <130: ICD-10-CM

## 2025-06-07 LAB
ANION GAP SERPL CALCULATED.3IONS-SCNC: 9 MMOL/L (ref 7–15)
BUN SERPL-MCNC: 11.6 MG/DL (ref 8–23)
CALCIUM SERPL-MCNC: 9.5 MG/DL (ref 8.8–10.4)
CHLORIDE SERPL-SCNC: 104 MMOL/L (ref 98–107)
CHOLEST SERPL-MCNC: 160 MG/DL
CREAT SERPL-MCNC: 0.77 MG/DL (ref 0.51–0.95)
EGFRCR SERPLBLD CKD-EPI 2021: 87 ML/MIN/1.73M2
FASTING STATUS PATIENT QL REPORTED: YES
FASTING STATUS PATIENT QL REPORTED: YES
GLUCOSE SERPL-MCNC: 97 MG/DL (ref 70–99)
HCO3 SERPL-SCNC: 25 MMOL/L (ref 22–29)
HDLC SERPL-MCNC: 43 MG/DL
LDLC SERPL CALC-MCNC: 95 MG/DL
NONHDLC SERPL-MCNC: 117 MG/DL
POTASSIUM SERPL-SCNC: 4.4 MMOL/L (ref 3.4–5.3)
SODIUM SERPL-SCNC: 138 MMOL/L (ref 135–145)
TRIGL SERPL-MCNC: 108 MG/DL

## 2025-06-07 PROCEDURE — 80048 BASIC METABOLIC PNL TOTAL CA: CPT

## 2025-06-07 PROCEDURE — 80061 LIPID PANEL: CPT

## 2025-06-07 PROCEDURE — 36415 COLL VENOUS BLD VENIPUNCTURE: CPT

## 2025-06-11 ENCOUNTER — OFFICE VISIT (OUTPATIENT)
Dept: FAMILY MEDICINE | Facility: CLINIC | Age: 65
End: 2025-06-11
Payer: COMMERCIAL

## 2025-06-11 ENCOUNTER — RESULTS FOLLOW-UP (OUTPATIENT)
Dept: FAMILY MEDICINE | Facility: CLINIC | Age: 65
End: 2025-06-11

## 2025-06-11 VITALS
RESPIRATION RATE: 18 BRPM | WEIGHT: 144.4 LBS | SYSTOLIC BLOOD PRESSURE: 126 MMHG | HEIGHT: 64 IN | DIASTOLIC BLOOD PRESSURE: 70 MMHG | BODY MASS INDEX: 24.65 KG/M2 | OXYGEN SATURATION: 98 % | HEART RATE: 82 BPM | TEMPERATURE: 97.8 F

## 2025-06-11 DIAGNOSIS — Z00.00 ROUTINE GENERAL MEDICAL EXAMINATION AT A HEALTH CARE FACILITY: Primary | ICD-10-CM

## 2025-06-11 DIAGNOSIS — I10 HYPERTENSION GOAL BP (BLOOD PRESSURE) < 140/90: ICD-10-CM

## 2025-06-11 DIAGNOSIS — E78.5 HYPERLIPIDEMIA LDL GOAL <130: ICD-10-CM

## 2025-06-11 PROCEDURE — 99396 PREV VISIT EST AGE 40-64: CPT | Performed by: FAMILY MEDICINE

## 2025-06-11 PROCEDURE — 99214 OFFICE O/P EST MOD 30 MIN: CPT | Mod: 25 | Performed by: FAMILY MEDICINE

## 2025-06-11 PROCEDURE — 3078F DIAST BP <80 MM HG: CPT | Performed by: FAMILY MEDICINE

## 2025-06-11 PROCEDURE — G2211 COMPLEX E/M VISIT ADD ON: HCPCS | Performed by: FAMILY MEDICINE

## 2025-06-11 PROCEDURE — 3074F SYST BP LT 130 MM HG: CPT | Performed by: FAMILY MEDICINE

## 2025-06-11 PROCEDURE — 1126F AMNT PAIN NOTED NONE PRSNT: CPT | Performed by: FAMILY MEDICINE

## 2025-06-11 RX ORDER — ATORVASTATIN CALCIUM 10 MG/1
10 TABLET, FILM COATED ORAL DAILY
Qty: 90 TABLET | Refills: 3 | Status: SHIPPED | OUTPATIENT
Start: 2025-06-11

## 2025-06-11 RX ORDER — AMLODIPINE BESYLATE 5 MG/1
5 TABLET ORAL DAILY
Qty: 90 TABLET | Refills: 1 | Status: SHIPPED | OUTPATIENT
Start: 2025-06-11

## 2025-06-11 ASSESSMENT — PAIN SCALES - GENERAL: PAINLEVEL_OUTOF10: NO PAIN (0)

## 2025-06-11 NOTE — PROGRESS NOTES
Preventive Care Visit  [facility]  Lauren Saez MD, Family Medicine  Jun 11, 2025      Assessment & Plan     (Z00.00) Routine general medical examination at a health care facility  (primary encounter diagnosis)  Comment: preventive needs reviewed   Plan: see orders in Epic.     (I10) Hypertension goal BP (blood pressure) < 140/90  Comment: controlled  Plan: amLODIPine (NORVASC) 5 MG tablet         Refill x 6 months     (E78.5) Hyperlipidemia LDL goal <130  Comment: to goal  Plan: atorvastatin (LIPITOR) 10 MG tablet        Refill x 1 yr         The longitudinal plan of care for the diagnosis(es)/condition(s) as documented were addressed during this visit. Due to the added complexity in care, I will continue to support Kaleigh in the subsequent management and with ongoing continuity of care.    Nicotine/Tobacco Cessation  She reports that she has been smoking cigarettes. She started smoking about 39 years ago. She has a 11.2 pack-year smoking history. She has never used smokeless tobacco.  Nicotine/Tobacco Cessation Plan  Information offered: Patient not interested at this time      Counseling  Appropriate preventive services were addressed with this patient via screening, questionnaire, or discussion as appropriate for fall prevention, nutrition, physical activity, Tobacco-use cessation, social engagement, weight loss and cognition.  Checklist reviewing preventive services available has been given to the patient.  Reviewed patient's diet, addressing concerns and/or questions.           Subjective   Kaleigh is a 64 year old, presenting for the following:  Well Child        6/11/2025     8:37 AM   Additional Questions   Roomed by Mik ORTIZ  Injured left little toe, bar stool fell on it while cleaning.            Advance Care Planning    Discussed advance care planning with patient; informed AVS has link to [facility] [employer].        6/6/2025   General Health   How would you rate your  overall physical health? Good   Feel stress (tense, anxious, or unable to sleep) Not at all         2025   Nutrition   Three or more servings of calcium each day? Yes   Diet: Regular (no restrictions)   How many servings of fruit and vegetables per day? (!) 2-3   How many sweetened beverages each day? 0-1         2025   Exercise   Days per week of moderate/strenous exercise 5 days   Average minutes spent exercising at this level 150+ min         2025   Social Factors   Frequency of gathering with friends or relatives Once a week   Worry food won't last until get money to buy more No   Food not last or not have enough money for food? No   Do you have housing? (Housing is defined as stable permanent housing and does not include staying outside in a car, in a tent, in an abandoned building, in an overnight shelter, or couch-surfing.) Yes   Are you worried about losing your housing? No   Lack of transportation? No   Unable to get utilities (heat,electricity)? No         2025   Fall Risk   Fallen 2 or more times in the past year? No   Trouble with walking or balance? No          2025   Dental   Dentist two times every year? Yes         Today's PHQ-2 Score:       6/10/2025    10:24 AM   PHQ-2 ( 1999 [employer])   Q1: Little interest or pleasure in doing things 0   Q2: Feeling down, depressed or hopeless 0   PHQ-2 Score 0    Q1: Little interest or pleasure in doing things Not at all   Q2: Feeling down, depressed or hopeless Not at all   PHQ-2 Score 0       Patient-reported           2025   Substance Use   Alcohol more than 3/day or more than 7/wk No   Do you use any other substances recreationally? No     Social History     Tobacco Use    Smoking status: Light Smoker     Current packs/day: 0.00     Average packs/day: 0.5 packs/day for 22.4 years (11.2 ttl pk-yrs)     Types: Cigarettes     Start date: 1985     Last attempt to quit: 2008     Years since quittin.4    Smokeless tobacco: Never    Vaping Use    Vaping status: Never Used   Substance Use Topics    Alcohol use: Yes     Comment: rare    Drug use: No           2023   LAST FHS-7 RESULTS   1st degree relative breast or ovarian cancer No   Any relative bilateral breast cancer No   Any male have breast cancer No   Any ONE woman have BOTH breast AND ovarian cancer No   Any woman with breast cancer before 50yrs No   2 or more relatives with breast AND/OR ovarian cancer No   2 or more relatives with breast AND/OR bowel cancer No        Mammogram Screening - Mammogram every 1-2 years updated in Health Maintenance based on mutual decision making    G 1 P 1   No LMP recorded. Patient is postmenopausal.     Fasting: No   Td: tdap 2016       Flu: 10/2024      Covid: 2021      Shingrix: done      PPV: discussed       RSV: NA               Cholesterol:   Lab Results   Component Value Date    CHOL 160 2025    CHOL 214 2021     Lab Results   Component Value Date    HDL 43 2025    HDL 56 2021     Lab Results   Component Value Date    LDL 95 2025     2021     Lab Results   Component Value Date    TRIG 108 2025    TRIG 155 2021     Lab Results   Component Value Date    CHOLHDLRATIO 6.1 2009         MM2023  Dexa:  NA     Flex/colo: 2023 q3y CG      Seat Belt: Yes    Sunscreen use: Yes   Calcium Intake: adeq  Health Care Directive: No  Sexually Active: Yes     Current contraception: none  History of abnormal Pap smear: Yes: see prob list  Family history of colon/breast/ovarian cancer: No  Regular self breast exam: Yes  History of abnormal mammogram: No          2025   STI Screening   New sexual partner(s) since last STI/HIV test? No     History of abnormal Pap smear: YES - reflected in Problem List and Health Maintenance accordingly        Latest Ref Rng & Units 2023     8:12 AM 3/25/2020     8:55 AM 3/25/2020     8:48 AM   PAP / HPV   PAP  Negative for Intraepithelial Lesion or  Malignancy (NILM)      PAP (Historical)    NIL    HPV 16 DNA Negative Negative  Negative     HPV 18 DNA Negative Negative  Negative     Other HR HPV Negative Negative  Negative       ASCVD Risk   The 10-year ASCVD risk score ([place] CRUZ, et al., 2019) is: 10.3%    Values used to calculate the score:      Age: 64 years      Sex: Female      Is Non- : No      Diabetic: No      Tobacco smoker: Yes      Systolic Blood Pressure: 126 mmHg      Is BP treated: Yes      HDL Cholesterol: 43 mg/dL      Total Cholesterol: 160 mg/dL           Reviewed and updated as needed this visit by Provider   Tobacco  Allergies  Meds  Problems  Med Hx  Surg Hx  Fam Hx            Labs reviewed in EPIC  BP Readings from Last 3 Encounters:   25 126/70   24 133/75   24 117/70    Wt Readings from Last 3 Encounters:   25 65.5 kg (144 lb 6.4 oz)   06/10/24 66.7 kg (147 lb)   23 68.4 kg (150 lb 12.8 oz)                  Patient Active Problem List   Diagnosis    CARDIOVASCULAR SCREENING; LDL GOAL LESS THAN 160    Anaphylaxis, subsequent encounter    Cervical high risk HPV (human papillomavirus) test positive    Hypertension goal BP (blood pressure) < 140/90    Prediabetes    Hyperlipidemia LDL goal <130     Past Surgical History:   Procedure Laterality Date    NO HISTORY OF SURGERY         Social History     Tobacco Use    Smoking status: Light Smoker     Current packs/day: 0.00     Average packs/day: 0.5 packs/day for 22.4 years (11.2 ttl pk-yrs)     Types: Cigarettes     Start date: 1985     Last attempt to quit: 2008     Years since quittin.4    Smokeless tobacco: Never   Substance Use Topics    Alcohol use: Yes     Comment: rare     Family History   Problem Relation Age of Onset    Heart Disease Father     Dementia Father     Diabetes Father         At 80 yrs old he got diabetes    Neurologic Disorder Brother     Lipids Sister     Hyperlipidemia Sister     Lipids  "Sister     Myocardial Infarction Mother     Hypertension Mother     Hyperlipidemia Brother          Current Outpatient Medications   Medication Sig Dispense Refill    amLODIPine (NORVASC) 5 MG tablet Take 1 tablet (5 mg) by mouth daily. 90 tablet 1    atorvastatin (LIPITOR) 10 MG tablet Take 1 tablet (10 mg) by mouth daily. 90 tablet 3         Review of Systems  Constitutional, neuro, ENT, endocrine, pulmonary, cardiac, gastrointestinal, genitourinary, musculoskeletal, integument and psychiatric systems are negative, except as otherwise noted.     Objective    Exam  /70   Pulse 82   Temp 97.8  F (36.6  C) (Oral)   Resp 18   Ht 1.626 m (5' 4\")   Wt 65.5 kg (144 lb 6.4 oz)   SpO2 98%   BMI 24.79 kg/m     Estimated body mass index is 24.79 kg/m  as calculated from the following:    Height as of this encounter: 1.626 m (5' 4\").    Weight as of this encounter: 65.5 kg (144 lb 6.4 oz).    Physical Exam  GENERAL: alert and no distress  EYES: Eyes grossly normal to inspection, PERRL and conjunctivae and sclerae normal  HENT: ear canals and TM's normal, nose and mouth without ulcers or lesions  NECK: no adenopathy, no asymmetry, masses, or scars  RESP: lungs clear to auscultation - no rales, rhonchi or wheezes  CV: regular rate and rhythm, normal S1 S2, no S3 or S4, no murmur, click or rub, no peripheral edema  ABDOMEN: soft, nontender, no hepatosplenomegaly, no masses and bowel sounds normal  MS: no gross musculoskeletal defects noted, no edema  SKIN: no suspicious lesions or rashes  NEURO: Normal strength and tone, mentation intact and speech normal  PSYCH: mentation appears normal, affect normal/bright        Signed Electronically by: Lauren Saez MD    "

## 2025-06-11 NOTE — PATIENT INSTRUCTIONS
Patient Education   Preventive Care Advice   This is general advice given by our system to help you stay healthy. However, your care team may have specific advice just for you. Please talk to your care team about your preventive care needs.  Nutrition  Eat 5 or more servings of fruits and vegetables each day.  Try wheat bread, brown rice and whole grain pasta (instead of white bread, rice, and pasta).  Get enough calcium and vitamin D. Check the label on foods and aim for 100% of the RDA (recommended daily allowance).  Lifestyle  Exercise at least 150 minutes each week  (30 minutes a day, 5 days a week).  Do muscle strengthening activities 2 days a week. These help control your weight and prevent disease.  No smoking.  Wear sunscreen to prevent skin cancer.  Have a dental exam and cleaning every 6 months.  Yearly exams  See your health care team every year to talk about:  Any changes in your health.  Any medicines your care team has prescribed.  Preventive care, family planning, and ways to prevent chronic diseases.  Shots (vaccines)   HPV shots (up to age 26), if you've never had them before.  Hepatitis B shots (up to age 59), if you've never had them before.  COVID-19 shot: Get this shot when it's due.  Flu shot: Get a flu shot every year.  Tetanus shot: Get a tetanus shot every 10 years.  Pneumococcal, hepatitis A, and RSV shots: Ask your care team if you need these based on your risk.  Shingles shot (for age 50 and up)  General health tests  Diabetes screening:  Starting at age 35, Get screened for diabetes at least every 3 years.  If you are younger than age 35, ask your care team if you should be screened for diabetes.  Cholesterol test: At age 39, start having a cholesterol test every 5 years, or more often if advised.  Bone density scan (DEXA): At age 50, ask your care team if you should have this scan for osteoporosis (brittle bones).  Hepatitis C: Get tested at least once in your life.  STIs (sexually  transmitted infections)  Before age 24: Ask your care team if you should be screened for STIs.  After age 24: Get screened for STIs if you're at risk. You are at risk for STIs (including HIV) if:  You are sexually active with more than one person.  You don't use condoms every time.  You or a partner was diagnosed with a sexually transmitted infection.  If you are at risk for HIV, ask about PrEP medicine to prevent HIV.  Get tested for HIV at least once in your life, whether you are at risk for HIV or not.  Cancer screening tests  Cervical cancer screening: If you have a cervix, begin getting regular cervical cancer screening tests starting at age 21.  Breast cancer scan (mammogram): If you've ever had breasts, begin having regular mammograms starting at age 40. This is a scan to check for breast cancer.  Colon cancer screening: It is important to start screening for colon cancer at age 45.  Have a colonoscopy test every 10 years (or more often if you're at risk) Or, ask your provider about stool tests like a FIT test every year or Cologuard test every 3 years.  To learn more about your testing options, visit:   .  For help making a decision, visit:   https://bit.ly/fg92985.  Prostate cancer screening test: If you have a prostate, ask your care team if a prostate cancer screening test (PSA) at age 55 is right for you.  Lung cancer screening: If you are a current or former smoker ages 50 to 80, ask your care team if ongoing lung cancer screenings are right for you.  For informational purposes only. Not to replace the advice of your health care provider. Copyright   2023 [employer]. All rights reserved. Clinically reviewed by the [facility] Transitions Program. [employer] [id number] - REV 01/24.

## 2025-06-28 ENCOUNTER — ANCILLARY PROCEDURE (OUTPATIENT)
Dept: MAMMOGRAPHY | Facility: CLINIC | Age: 65
End: 2025-06-28
Attending: FAMILY MEDICINE
Payer: COMMERCIAL

## 2025-06-28 DIAGNOSIS — Z12.31 ENCOUNTER FOR SCREENING MAMMOGRAM FOR BREAST CANCER: ICD-10-CM

## 2025-06-28 PROCEDURE — 77063 BREAST TOMOSYNTHESIS BI: CPT | Mod: TC | Performed by: STUDENT IN AN ORGANIZED HEALTH CARE EDUCATION/TRAINING PROGRAM

## 2025-06-28 PROCEDURE — 77067 SCR MAMMO BI INCL CAD: CPT | Mod: TC | Performed by: STUDENT IN AN ORGANIZED HEALTH CARE EDUCATION/TRAINING PROGRAM
